# Patient Record
Sex: FEMALE | Race: WHITE | NOT HISPANIC OR LATINO
[De-identification: names, ages, dates, MRNs, and addresses within clinical notes are randomized per-mention and may not be internally consistent; named-entity substitution may affect disease eponyms.]

---

## 2018-01-26 ENCOUNTER — APPOINTMENT (OUTPATIENT)
Dept: SURGERY | Facility: CLINIC | Age: 83
End: 2018-01-26
Payer: MEDICARE

## 2018-02-01 ENCOUNTER — APPOINTMENT (OUTPATIENT)
Dept: SURGERY | Facility: CLINIC | Age: 83
End: 2018-02-01
Payer: MEDICARE

## 2018-02-01 VITALS
WEIGHT: 129 LBS | HEIGHT: 65 IN | DIASTOLIC BLOOD PRESSURE: 78 MMHG | BODY MASS INDEX: 21.49 KG/M2 | SYSTOLIC BLOOD PRESSURE: 122 MMHG

## 2018-02-01 DIAGNOSIS — K40.90 UNILATERAL INGUINAL HERNIA, W/OUT OBSTRUCTION OR GANGRENE, NOT SPECIFIED AS RECURRENT: ICD-10-CM

## 2018-02-01 PROCEDURE — 99212 OFFICE O/P EST SF 10 MIN: CPT

## 2018-04-04 ENCOUNTER — OFFICE VISIT (OUTPATIENT)
Dept: URGENT CARE | Facility: CLINIC | Age: 83
End: 2018-04-04
Payer: MEDICARE

## 2018-04-04 VITALS
DIASTOLIC BLOOD PRESSURE: 84 MMHG | SYSTOLIC BLOOD PRESSURE: 232 MMHG | TEMPERATURE: 98 F | WEIGHT: 135 LBS | HEART RATE: 67 BPM | OXYGEN SATURATION: 98 % | HEIGHT: 64 IN | BODY MASS INDEX: 23.05 KG/M2 | RESPIRATION RATE: 16 BRPM

## 2018-04-04 DIAGNOSIS — I10 ELEVATED BLOOD PRESSURE READING IN OFFICE WITH DIAGNOSIS OF HYPERTENSION: ICD-10-CM

## 2018-04-04 DIAGNOSIS — M54.32 SCIATICA OF LEFT SIDE: Primary | ICD-10-CM

## 2018-04-04 PROCEDURE — 99213 OFFICE O/P EST LOW 20 MIN: CPT | Mod: 25,S$GLB,, | Performed by: NURSE PRACTITIONER

## 2018-04-04 PROCEDURE — 96372 THER/PROPH/DIAG INJ SC/IM: CPT | Mod: S$GLB,,, | Performed by: EMERGENCY MEDICINE

## 2018-04-04 RX ORDER — CLONIDINE HYDROCHLORIDE 0.1 MG/1
0.1 TABLET ORAL
Status: COMPLETED | OUTPATIENT
Start: 2018-04-04 | End: 2018-04-04

## 2018-04-04 RX ORDER — KETOROLAC TROMETHAMINE 30 MG/ML
30 INJECTION, SOLUTION INTRAMUSCULAR; INTRAVENOUS ONCE
Status: COMPLETED | OUTPATIENT
Start: 2018-04-04 | End: 2018-04-04

## 2018-04-04 RX ORDER — KETOROLAC TROMETHAMINE 10 MG/1
10 TABLET, FILM COATED ORAL EVERY 6 HOURS
Qty: 12 TABLET | Refills: 0 | Status: SHIPPED | OUTPATIENT
Start: 2018-04-05 | End: 2018-04-08

## 2018-04-04 RX ORDER — METFORMIN HYDROCHLORIDE 1000 MG/1
1000 TABLET ORAL 2 TIMES DAILY WITH MEALS
COMMUNITY

## 2018-04-04 RX ORDER — PIOGLITAZONEHYDROCHLORIDE 45 MG/1
45 TABLET ORAL DAILY
COMMUNITY

## 2018-04-04 RX ORDER — METOPROLOL SUCCINATE 100 MG/1
100 TABLET, EXTENDED RELEASE ORAL DAILY
COMMUNITY

## 2018-04-04 RX ORDER — LEVOTHYROXINE SODIUM 100 UG/1
100 TABLET ORAL DAILY
COMMUNITY

## 2018-04-04 RX ADMIN — CLONIDINE HYDROCHLORIDE 0.1 MG: 0.1 TABLET ORAL at 05:04

## 2018-04-04 RX ADMIN — KETOROLAC TROMETHAMINE 30 MG: 30 INJECTION, SOLUTION INTRAMUSCULAR; INTRAVENOUS at 05:04

## 2018-04-04 NOTE — PROGRESS NOTES
"Subjective:       Patient ID: Joycelyn Krishna is a 82 y.o. female.    Vitals:  height is 5' 4" (1.626 m) and weight is 61.2 kg (135 lb). Her temperature is 98.2 °F (36.8 °C). Her blood pressure is 232/84 (abnormal) and her pulse is 67. Her respiration is 16 and oxygen saturation is 98%.     Chief Complaint: Hip Pain    Pt presents today with acute left hip/ gluteal pain radiating down left leg. In town from [place] and has been walking more than normal. Denies injury/trauma      Hip Pain    The incident occurred 12 to 24 hours ago. There was no injury mechanism. The pain is present in the left hip. The quality of the pain is described as shooting and cramping. The pain is at a severity of 6/10. The pain is mild. The pain has been fluctuating since onset. Pertinent negatives include no inability to bear weight, loss of motion, loss of sensation, numbness or tingling. She reports no foreign bodies present. Exacerbated by: activity. She has tried nothing for the symptoms. The treatment provided no relief.     Review of Systems   Constitution: Negative for chills, decreased appetite and diaphoresis.   Eyes: Negative.    Cardiovascular: Negative.    Skin: Negative.    Musculoskeletal: Positive for joint pain (left hip) and stiffness.   Gastrointestinal: Negative for bloating and abdominal pain.   Genitourinary: Negative for decreased libido.   Neurological: Negative for numbness and tingling.   All other systems reviewed and are negative.      Objective:      Physical Exam   Constitutional: She is oriented to person, place, and time. She appears well-developed and well-nourished.   HENT:   Head: Normocephalic and atraumatic.   Right Ear: Hearing, tympanic membrane, external ear and ear canal normal. Tympanic membrane is not erythematous. No decreased hearing is noted.   Left Ear: Hearing, tympanic membrane, external ear and ear canal normal. Tympanic membrane is not erythematous. No decreased hearing is noted.   Nose: " Nose normal. No mucosal edema or rhinorrhea. Right sinus exhibits no maxillary sinus tenderness and no frontal sinus tenderness. Left sinus exhibits no maxillary sinus tenderness and no frontal sinus tenderness.   Mouth/Throat: Uvula is midline and mucous membranes are normal. No oropharyngeal exudate or posterior oropharyngeal erythema.   Eyes: Conjunctivae, EOM and lids are normal.   Neck: Normal range of motion. Neck supple.   Cardiovascular: Normal rate, regular rhythm, S1 normal, S2 normal and normal heart sounds.    Pulmonary/Chest: Effort normal and breath sounds normal. No respiratory distress.   Musculoskeletal:        Left hip: She exhibits normal range of motion, normal strength, no tenderness, no bony tenderness and no swelling.        Legs:  Ambulatory without difficulty. NV status intact, 2+ scout dp. No edema   Neurological: She is alert and oriented to person, place, and time. She has normal strength. No cranial nerve deficit or sensory deficit. GCS eye subscore is 4. GCS verbal subscore is 5.   Skin: Skin is warm and dry.   Nursing note and vitals reviewed.      Assessment:       1. Sciatica of left side    2. Elevated blood pressure reading in office with diagnosis of hypertension        Plan:         Sciatica of left side    Elevated blood pressure reading in office with diagnosis of hypertension    Other orders  -     cloNIDine tablet 0.1 mg; Take 1 tablet (0.1 mg total) by mouth one time.  -     ketorolac injection 30 mg; Inject 1 mL (30 mg total) into the muscle once.  -     ketorolac (TORADOL) 10 mg tablet; Take 1 tablet (10 mg total) by mouth every 6 (six) hours.  Dispense: 12 tablet; Refill: 0    elevated bp noted, patient forgot to take medication today

## 2018-07-21 ENCOUNTER — EMERGENCY (EMERGENCY)
Facility: HOSPITAL | Age: 83
LOS: 0 days | Discharge: HOME | End: 2018-07-21
Attending: EMERGENCY MEDICINE | Admitting: EMERGENCY MEDICINE

## 2018-07-21 VITALS
SYSTOLIC BLOOD PRESSURE: 236 MMHG | TEMPERATURE: 97 F | HEART RATE: 67 BPM | RESPIRATION RATE: 20 BRPM | OXYGEN SATURATION: 97 % | DIASTOLIC BLOOD PRESSURE: 100 MMHG

## 2018-07-21 VITALS
OXYGEN SATURATION: 99 % | HEART RATE: 60 BPM | SYSTOLIC BLOOD PRESSURE: 217 MMHG | RESPIRATION RATE: 18 BRPM | DIASTOLIC BLOOD PRESSURE: 89 MMHG
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
## 2018-07-21 DIAGNOSIS — E11.9 TYPE 2 DIABETES MELLITUS WITHOUT COMPLICATIONS: ICD-10-CM

## 2018-07-21 DIAGNOSIS — E03.9 HYPOTHYROIDISM, UNSPECIFIED: ICD-10-CM

## 2018-07-21 DIAGNOSIS — I10 ESSENTIAL (PRIMARY) HYPERTENSION: ICD-10-CM
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
## 2018-07-21 DIAGNOSIS — M25.521 PAIN IN RIGHT ELBOW: ICD-10-CM
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
## 2018-07-21 LAB
ALBUMIN SERPL ELPH-MCNC: 3.7 G/DL — SIGNIFICANT CHANGE UP (ref 3.5–5.2)
ALP SERPL-CCNC: 47 U/L — SIGNIFICANT CHANGE UP (ref 30–115)
ALT FLD-CCNC: 9 U/L — SIGNIFICANT CHANGE UP (ref 0–41)
ANION GAP SERPL CALC-SCNC: 13 MMOL/L — SIGNIFICANT CHANGE UP (ref 7–14)
APPEARANCE UR: CLEAR — SIGNIFICANT CHANGE UP
APTT BLD: 33.3 SEC — SIGNIFICANT CHANGE UP (ref 27–39.2)
AST SERPL-CCNC: 15 U/L — SIGNIFICANT CHANGE UP (ref 0–41)
BASOPHILS # BLD AUTO: 0.06 K/UL — SIGNIFICANT CHANGE UP (ref 0–0.2)
BASOPHILS NFR BLD AUTO: 0.9 % — SIGNIFICANT CHANGE UP (ref 0–1)
BILIRUB SERPL-MCNC: 0.2 MG/DL — SIGNIFICANT CHANGE UP (ref 0.2–1.2)
BILIRUB UR-MCNC: NEGATIVE — SIGNIFICANT CHANGE UP
BUN SERPL-MCNC: 26 MG/DL — HIGH (ref 10–20)
CALCIUM SERPL-MCNC: 9.6 MG/DL — SIGNIFICANT CHANGE UP (ref 8.5–10.1)
CHLORIDE SERPL-SCNC: 102 MMOL/L — SIGNIFICANT CHANGE UP (ref 98–110)
CO2 SERPL-SCNC: 26 MMOL/L — SIGNIFICANT CHANGE UP (ref 17–32)
COLOR SPEC: YELLOW — SIGNIFICANT CHANGE UP
CREAT SERPL-MCNC: 1.2 MG/DL — SIGNIFICANT CHANGE UP (ref 0.7–1.5)
DIFF PNL FLD: NEGATIVE — SIGNIFICANT CHANGE UP
EOSINOPHIL # BLD AUTO: 0.16 K/UL — SIGNIFICANT CHANGE UP (ref 0–0.7)
EOSINOPHIL NFR BLD AUTO: 2.5 % — SIGNIFICANT CHANGE UP (ref 0–8)
GLUCOSE SERPL-MCNC: 118 MG/DL — HIGH (ref 70–99)
GLUCOSE UR QL: NEGATIVE — SIGNIFICANT CHANGE UP
HCT VFR BLD CALC: 30.1 % — LOW (ref 37–47)
HGB BLD-MCNC: 9.7 G/DL — LOW (ref 12–16)
IMM GRANULOCYTES NFR BLD AUTO: 0.3 % — SIGNIFICANT CHANGE UP (ref 0.1–0.3)
INR BLD: 1.15 RATIO — SIGNIFICANT CHANGE UP (ref 0.65–1.3)
KETONES UR-MCNC: NEGATIVE — SIGNIFICANT CHANGE UP
LEUKOCYTE ESTERASE UR-ACNC: NEGATIVE — SIGNIFICANT CHANGE UP
LYMPHOCYTES # BLD AUTO: 1.44 K/UL — SIGNIFICANT CHANGE UP (ref 1.2–3.4)
LYMPHOCYTES # BLD AUTO: 22.6 % — SIGNIFICANT CHANGE UP (ref 20.5–51.1)
MCHC RBC-ENTMCNC: 26.6 PG — LOW (ref 27–31)
MCHC RBC-ENTMCNC: 32.2 G/DL — SIGNIFICANT CHANGE UP (ref 32–37)
MCV RBC AUTO: 82.7 FL — SIGNIFICANT CHANGE UP (ref 81–99)
MONOCYTES # BLD AUTO: 0.88 K/UL — HIGH (ref 0.1–0.6)
MONOCYTES NFR BLD AUTO: 13.8 % — HIGH (ref 1.7–9.3)
NEUTROPHILS # BLD AUTO: 3.81 K/UL — SIGNIFICANT CHANGE UP (ref 1.4–6.5)
NEUTROPHILS NFR BLD AUTO: 59.9 % — SIGNIFICANT CHANGE UP (ref 42.2–75.2)
NITRITE UR-MCNC: NEGATIVE — SIGNIFICANT CHANGE UP
NRBC # BLD: 0 /100 WBCS — SIGNIFICANT CHANGE UP (ref 0–0)
PH UR: 6 — SIGNIFICANT CHANGE UP (ref 5–8)
PLATELET # BLD AUTO: 258 K/UL — SIGNIFICANT CHANGE UP (ref 130–400)
POTASSIUM SERPL-MCNC: 4.2 MMOL/L — SIGNIFICANT CHANGE UP (ref 3.5–5)
POTASSIUM SERPL-SCNC: 4.2 MMOL/L — SIGNIFICANT CHANGE UP (ref 3.5–5)
PROT SERPL-MCNC: 6.6 G/DL — SIGNIFICANT CHANGE UP (ref 6–8)
PROT UR-MCNC: NEGATIVE — SIGNIFICANT CHANGE UP
PROTHROM AB SERPL-ACNC: 12.4 SEC — SIGNIFICANT CHANGE UP (ref 9.95–12.87)
RBC # BLD: 3.64 M/UL — LOW (ref 4.2–5.4)
RBC # FLD: 14.9 % — HIGH (ref 11.5–14.5)
SODIUM SERPL-SCNC: 141 MMOL/L — SIGNIFICANT CHANGE UP (ref 135–146)
SP GR SPEC: 1.01 — SIGNIFICANT CHANGE UP (ref 1.01–1.03)
TROPONIN T SERPL-MCNC: <0.01 NG/ML — SIGNIFICANT CHANGE UP
UROBILINOGEN FLD QL: 0.2 — SIGNIFICANT CHANGE UP (ref 0.2–0.2)
WBC # BLD: 6.37 K/UL — SIGNIFICANT CHANGE UP (ref 4.8–10.8)
WBC # FLD AUTO: 6.37 K/UL — SIGNIFICANT CHANGE UP (ref 4.8–10.8)

## 2018-07-21 RX ORDER — SODIUM CHLORIDE 9 MG/ML
3 INJECTION INTRAMUSCULAR; INTRAVENOUS; SUBCUTANEOUS ONCE
Qty: 0 | Refills: 0 | Status: COMPLETED | OUTPATIENT
Start: 2018-07-21 | End: 2018-07-21

## 2018-07-21 RX ADMIN — SODIUM CHLORIDE 3 MILLILITER(S): 9 INJECTION INTRAMUSCULAR; INTRAVENOUS; SUBCUTANEOUS at 18:28

## 2018-07-21 NOTE — ED ADULT NURSE NOTE - OBJECTIVE STATEMENT
patient s/p fall three days ago and complaint of right arm pain. sent from urgent care center for further evaluation.

## 2018-07-21 NOTE — ED PROVIDER NOTE - OBJECTIVE STATEMENT
82 year old female, pmhx HTN, Hypothyroid, DM, presenting s/p fall 3 days ago. Patient states she was reaching for a pot in the kitchen and then had sudden right elbow pain, which caused her to lose her balance and fall. States she hit her head but no LOC, and remembers the whole event. Fall was witnessed by her cousins, who are not at bedside, but daughter at bedside states patient is reliable historian. Patient admits to (+) right elbow pain, but denies fevers, headaches, vision changes, subjective neuro symptoms, dyspnea, palpitations, nausea, vomiting, diarrhea, blood in stool/dark stools, urinary symptoms or other extremity pain, but admits to (+) right rib pain and abdominal discomfort in the LUQ. She was seen at an urgent care today at which time they found her to be hypertensive, so she was sent to the ER. Patient states she forgot to take her BP medication today, but her granddaughter at bedside gave her her medication after arrival to the ED, after VS had been taken. 82 year old female, pmhx HTN, Hypothyroid, DM, presenting s/p fall 3 days ago. Patient states she was reaching for a pot in the kitchen,, which caused her to lose her balance and fall. States she hit her head but no LOC, and remembers the whole event. Fall was witnessed by her cousins, who are not at bedside, but daughter at bedside states patient is reliable historian. Patient admits to (+) right elbow pain, but denies fevers, headaches, vision changes, subjective neuro symptoms, dyspnea, palpitations, nausea, vomiting, diarrhea, blood in stool/dark stools, urinary symptoms or other extremity pain, but admits to (+) right rib pain and abdominal discomfort in the LUQ. She was seen at an urgent care today at which time they found her to be hypertensive, so she was sent to the ER. Patient states she forgot to take her BP medication today, but her granddaughter at bedside gave her her medication after arrival to the ED, after VS had been taken.

## 2018-07-21 NOTE — ED PROVIDER NOTE - PROGRESS NOTE DETAILS
Patient seen and evaluated by me. Labs/imaging ordered. Given that patient fell and is complaining of right rib and upper abd pain, will do trauma work up. In terms of her hypertension, patient took her BP med after initial VS had been taken in triage. Will check for end organ damage, and re-evaluate.

## 2018-07-21 NOTE — ED PROVIDER NOTE - MEDICAL DECISION MAKING DETAILS
Patient with traumatic fall 3 days ago, found to be hypertensive here in ED, but had not taken her BP meds. Patient otherwise asymptomatic from her hypertension. No acute traumatic injuries noted on imaging. Patient well appearing with good outpatient follow up. Fall was mechanical and not syncopal in nature given history, and patient being reliable historian. Patient agrees to follow up with her PMD. Patient with traumatic fall 3 days ago, found to be hypertensive here in ED, but had not taken her BP meds. Patient otherwise asymptomatic from her hypertension. No acute traumatic injuries noted on imaging. Patient well appearing with good outpatient follow up. Fall was mechanical and not syncopal in nature given history, and patient being reliable historian. Patient agrees to follow up with her PMD. Patient notified of incidental findings on CTs, including nodules and need for outpatient follow up. Given copies of reports of these imaging studies at discharge.

## 2018-07-21 NOTE — ED ADULT TRIAGE NOTE - CHIEF COMPLAINT QUOTE
mechanical fall x 3 days ago, no LOC/not on blood thinners. abrasion to chin and knee. right arm pain. elevated BP, compliant with medication. forgot to taken antihypertensive medication today

## 2018-07-21 NOTE — ED PROVIDER NOTE - PHYSICAL EXAMINATION
Vital Signs: I have reviewed the initial vital signs.  Constitutional: NAD, well-nourished, appears stated age, no acute distress.  HEENT: Airway patent, moist MM, no erythema/swelling/deformity of oral structures. EOMI, PERRLA.  CV: regular rate, regular rhythm, well-perfused extremities, 2+ b/l DP and radial pulses equal.  Lungs: BCTA, no increased WOB.  ABD: LUQ tenderness, no guarding or rebound, no pulsatile mass, no hernias. No ecchymosis  MSK: Neck supple, nontender, nl ROM, no stepoff. Chest tender along the right ribs, but no overlying ecchymosis. Back nontender in TLS spine or to b/l bony structures or flanks. Right elbow (+) tenderness along the lateral epicondyle with mild effusion, slightly diminished ROM due to pain, otherwise other extremities nl rom, no deformity.   INTEG: Skin warm, dry, no rash.  NEURO: A&Ox3, CN II-XII intact, normal strength 5/5 all 4 ext, nl sensation throughout, normal speech and coordination. Ambulatory in ED  PSYCH: Calm, cooperative, normal affect and interaction. Vital Signs: I have reviewed the initial vital signs.  Constitutional: NAD, well-nourished, appears stated age, no acute distress.  HEENT: Airway patent, moist MM, no erythema/swelling/deformity of oral structures. EOMI, PERRLA.  CV: regular rate, regular rhythm, well-perfused extremities, 2+ b/l DP and radial pulses equal.  Lungs: BCTA, no increased WOB.  ABD: Diffuse, most pronounced in LUQ tenderness, no guarding or rebound, no pulsatile mass, no hernias. No ecchymosis  MSK: Neck supple, nontender, nl ROM, no stepoff. Chest tender along the right ribs diffusely, but no overlying ecchymosis. Back nontender in TLS spine or to b/l bony structures or flanks. Right elbow (+) tenderness along the lateral epicondyle with mild effusion, slightly diminished ROM due to pain, otherwise other extremities nl rom, no deformity.   INTEG: Skin warm, dry, no rash.  NEURO: A&Ox3, CN II-XII intact, normal strength 5/5 all 4 ext, nl sensation throughout, normal speech and coordination. Ambulatory in ED  PSYCH: Calm, cooperative, normal affect and interaction.

## 2018-07-21 NOTE — ED PROVIDER NOTE - CARE PLAN
Principal Discharge DX:	Fall, initial encounter  Secondary Diagnosis:	Hypertension, unspecified type

## 2018-07-23 ENCOUNTER — EMERGENCY (EMERGENCY)
Facility: HOSPITAL | Age: 83
LOS: 0 days | Discharge: HOME | End: 2018-07-23
Attending: EMERGENCY MEDICINE | Admitting: EMERGENCY MEDICINE

## 2018-07-23 VITALS
DIASTOLIC BLOOD PRESSURE: 78 MMHG | TEMPERATURE: 96 F | OXYGEN SATURATION: 98 % | SYSTOLIC BLOOD PRESSURE: 180 MMHG | RESPIRATION RATE: 18 BRPM | HEART RATE: 71 BPM
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
## 2018-07-23 DIAGNOSIS — Z79.899 OTHER LONG TERM (CURRENT) DRUG THERAPY: ICD-10-CM

## 2018-07-23 DIAGNOSIS — S42.494D: ICD-10-CM

## 2018-07-23 DIAGNOSIS — M25.521 PAIN IN RIGHT ELBOW: ICD-10-CM

## 2018-07-23 NOTE — ED POST DISCHARGE NOTE - DETAILS
SPOKE WITH SON AND PATIENT IS RETURNING TO ED ASAP FOR ED ATTENDING EVALUATION. SPOKE WITH SON AND PATIENT IS RETURNING TO ED ASAP FOR ED ATTENDING EVALUATION. ALSO, DISCUSSED WITH SON, QIAN THE NEED FOR PATIENT TO CALL PMD TODAY REGARDING LUNG NODULE AND ADRENAL NODULE AND NEED FOR PET SCAN AND MRI, AS ADVISED BY RADIOLOGIST.

## 2018-07-23 NOTE — ED PROVIDER NOTE - PHYSICAL EXAMINATION
CONSTITUTIONAL: Well-developed; well-nourished; in no acute distress.   SKIN: warm, dry  EXT: Pain to the right elbow. Cap refill <2 sec.   NEURO: Sensation to light touch intact.   PSYCH: Cooperative, appropriate.

## 2018-07-23 NOTE — ED POST DISCHARGE NOTE - RESULT SUMMARY
R HUMERUS FX NOTED: R HUMERUS FX NOTED: ALSO NOTED IS A R LUNG 3.1 CM NODULE, AND A PET SCAN IS ADVISED. A LEFT ADRENAL NODULE NOTED AND AN MRI IS ADVISED

## 2018-07-23 NOTE — ED PROVIDER NOTE - CARE PLAN
Principal Discharge DX:	Other closed nondisplaced fracture of distal end of right humerus with routine healing, subsequent encounter

## 2018-07-23 NOTE — ED PROVIDER NOTE - ATTENDING CONTRIBUTION TO CARE
Pt is a 81yo female with R elbow pain.  Was called [facility] for distal humerus fx seen on XR.  No other complaints.    Exam: R elbow with skin abrasion without signs of infection, tenderness over elbow, no forearm or prox humerus tenderness, 2+ raidal pulse, cap refill <2s, 5/5 hand   Imp: distal radius fx  Plan: splint, ortho f/u

## 2018-07-24 PROBLEM — E11.9 TYPE 2 DIABETES MELLITUS WITHOUT COMPLICATIONS: Chronic | Status: ACTIVE | Noted: 2018-07-21

## 2018-07-24 PROBLEM — I10 ESSENTIAL (PRIMARY) HYPERTENSION: Chronic | Status: ACTIVE | Noted: 2018-07-21

## 2020-10-09 NOTE — ED ADULT NURSE NOTE - FINAL NURSING ELECTRONIC SIGNATURE
23-Jul-2018 18:59 [ECG Reviewed] : reviewed [Normal] : The 12 - lead ECG is normal [Sinus Bradycardia] : sinus bradycardia [P Waves Normal] : the P wave is normal [ECG Intervals CA.] : CA interval is normal [Normal QRS] : the QRS is normal [Low Voltage] : low voltage [ECG Axis] : the QRS axis is normal [Normal ST Segments] : the ST segments are normal [ECG T. Waves] : normal [No Interval Change] : no interval change

## 2020-11-02 ENCOUNTER — LABORATORY RESULT (OUTPATIENT)
Age: 85
End: 2020-11-02

## 2020-11-03 ENCOUNTER — APPOINTMENT (OUTPATIENT)
Dept: GYNECOLOGIC ONCOLOGY | Facility: CLINIC | Age: 85
End: 2020-11-03

## 2020-11-10 ENCOUNTER — APPOINTMENT (OUTPATIENT)
Dept: GYNECOLOGIC ONCOLOGY | Facility: CLINIC | Age: 85
End: 2020-11-10
Payer: MEDICARE

## 2020-11-10 ENCOUNTER — OUTPATIENT (OUTPATIENT)
Dept: OUTPATIENT SERVICES | Facility: HOSPITAL | Age: 85
LOS: 1 days | Discharge: HOME | End: 2020-11-10

## 2020-11-10 VITALS
DIASTOLIC BLOOD PRESSURE: 75 MMHG | WEIGHT: 125 LBS | HEIGHT: 63 IN | HEART RATE: 65 BPM | BODY MASS INDEX: 22.15 KG/M2 | SYSTOLIC BLOOD PRESSURE: 178 MMHG | TEMPERATURE: 97.6 F

## 2020-11-10 PROCEDURE — 99203 OFFICE O/P NEW LOW 30 MIN: CPT | Mod: 57

## 2020-11-12 NOTE — DISCUSSION/SUMMARY
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 \par - discussed with the patient uncertainty of Embx findings and possibility of invasive cancer\par - discussed need for surgery hysterectomy/BSO and possible staging\par - R/B/A explained, will book patient for RATLH/BSO, possible staging, possible exploratory laparotomy\par - medical and pulmonary clearances\par - CT chest/abdomen/pelvis. Patient scheduled to have CT chest today as surveillance of her lung cancer. Per patient recently had CT abdomen/pelvis done. Will try to get records and if unable will send for above test.

## 2020-11-12 NOTE — HISTORY OF PRESENT ILLNESS
[FreeTextEntry1] : Referring physician: Dr. 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 \par \par Ob Hx: NSVDX4\par Gyn Hx: LMP age 52. Denies cysts, fibroids, abnormal PAP smears and STDs\par PMHx: DM, hypothyroidism, childhood asthma, lung cancer s/p lobectomy of 2 lobes in right lung ([facility] 2018)\par PSHx: left inguinal hernia, tonsillectomy, partial thyroidectomy\par Medications: synthroid, glimepiride, metformin, metoprolol, telmisartan, atorvastatin, jenuvia, hydralazine\par Allergies: NKDA\par Social Hx: Denies smoking, alcohol or illicit drug use\par Family Hx: sister lung cancer, brother stomach cancer\par \par Last PAP smear: 12/2019 NILM\par Last mammogram: 12/2019 normal\par Last colonoscopy: 5 years ago normal\par \par 10/26/20 CA-125 7\par 11/2/2020: Embx scant superficial strips of glandular epithelium showing several strips with marked cytologic atypia and nuclear crowding, 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 Cells of interest show strong p53 immunostaining and increase of Ki67 and p16 expression, supporting the diagnosis. Benign endocervical polyp.\par \par Denies acute complaints today. Reports 50 lbs weight loss in past 5 years, unintentional. Denies fever, chills, vomiting, vaginal bleeding, loss of appetite, urinary incontinence, change in bowel habits, hematuria.

## 2020-11-12 NOTE — END OF VISIT
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  Given concern for malignancy and high likelihood of finding invasive serous carcinoma at the time of surgery, recommend robotic hysterectomy with BSO staging.  All risks/benefits/alternatives discussed.  All questions answered.  Patient agreed to plan [Time Spent: ___ minutes] : I have spent [unfilled] minutes of time on the encounter. [>50% of the face to face encounter time was spent on counseling and/or coordination of care for ___] : Greater than 50% of the face to face encounter time was spent on counseling and/or coordination of care for [unfilled]

## 2020-11-19 ENCOUNTER — RESULT REVIEW (OUTPATIENT)
Age: 85
End: 2020-11-19

## 2020-11-19 ENCOUNTER — OUTPATIENT (OUTPATIENT)
Dept: OUTPATIENT SERVICES | Facility: HOSPITAL | Age: 85
LOS: 1 days | Discharge: HOME | End: 2020-11-19
Payer: MEDICARE

## 2020-11-19 VITALS
HEART RATE: 61 BPM | SYSTOLIC BLOOD PRESSURE: 145 MMHG | TEMPERATURE: 97 F | WEIGHT: 112.66 LBS | DIASTOLIC BLOOD PRESSURE: 77 MMHG | OXYGEN SATURATION: 99 % | RESPIRATION RATE: 16 BRPM

## 2020-11-19 DIAGNOSIS — Z98.890 OTHER SPECIFIED POSTPROCEDURAL STATES: Chronic | ICD-10-CM

## 2020-11-19 DIAGNOSIS — C54.1 MALIGNANT NEOPLASM OF ENDOMETRIUM: ICD-10-CM

## 2020-11-19 DIAGNOSIS — Z01.818 ENCOUNTER FOR OTHER PREPROCEDURAL EXAMINATION: ICD-10-CM

## 2020-11-19 LAB
A1C WITH ESTIMATED AVERAGE GLUCOSE RESULT: 7.9 % — HIGH (ref 4–5.6)
ALBUMIN SERPL ELPH-MCNC: 4.3 G/DL — SIGNIFICANT CHANGE UP (ref 3.5–5.2)
ALP SERPL-CCNC: 63 U/L — SIGNIFICANT CHANGE UP (ref 30–115)
ALT FLD-CCNC: 17 U/L — SIGNIFICANT CHANGE UP (ref 0–41)
ANION GAP SERPL CALC-SCNC: 11 MMOL/L — SIGNIFICANT CHANGE UP (ref 7–14)
APPEARANCE UR: ABNORMAL
APTT BLD: 33 SEC — SIGNIFICANT CHANGE UP (ref 27–39.2)
AST SERPL-CCNC: 19 U/L — SIGNIFICANT CHANGE UP (ref 0–41)
BACTERIA # UR AUTO: ABNORMAL
BASOPHILS # BLD AUTO: 0.09 K/UL — SIGNIFICANT CHANGE UP (ref 0–0.2)
BASOPHILS NFR BLD AUTO: 1.3 % — HIGH (ref 0–1)
BILIRUB SERPL-MCNC: 0.4 MG/DL — SIGNIFICANT CHANGE UP (ref 0.2–1.2)
BILIRUB UR-MCNC: NEGATIVE — SIGNIFICANT CHANGE UP
BUN SERPL-MCNC: 33 MG/DL — HIGH (ref 10–20)
CALCIUM SERPL-MCNC: 9.5 MG/DL — SIGNIFICANT CHANGE UP (ref 8.5–10.1)
CHLORIDE SERPL-SCNC: 102 MMOL/L — SIGNIFICANT CHANGE UP (ref 98–110)
CO2 SERPL-SCNC: 25 MMOL/L — SIGNIFICANT CHANGE UP (ref 17–32)
COLOR SPEC: SIGNIFICANT CHANGE UP
CREAT SERPL-MCNC: 1 MG/DL — SIGNIFICANT CHANGE UP (ref 0.7–1.5)
DIFF PNL FLD: NEGATIVE — SIGNIFICANT CHANGE UP
EOSINOPHIL # BLD AUTO: 0.17 K/UL — SIGNIFICANT CHANGE UP (ref 0–0.7)
EOSINOPHIL NFR BLD AUTO: 2.5 % — SIGNIFICANT CHANGE UP (ref 0–8)
EPI CELLS # UR: 3 /HPF — SIGNIFICANT CHANGE UP (ref 0–5)
ESTIMATED AVERAGE GLUCOSE: 180 MG/DL — HIGH (ref 68–114)
GLUCOSE SERPL-MCNC: 146 MG/DL — HIGH (ref 70–99)
GLUCOSE UR QL: ABNORMAL
HCT VFR BLD CALC: 33.7 % — LOW (ref 37–47)
HGB BLD-MCNC: 10.1 G/DL — LOW (ref 12–16)
HYALINE CASTS # UR AUTO: 0 /LPF — SIGNIFICANT CHANGE UP (ref 0–7)
IMM GRANULOCYTES NFR BLD AUTO: 0.1 % — SIGNIFICANT CHANGE UP (ref 0.1–0.3)
INR BLD: 1.05 RATIO — SIGNIFICANT CHANGE UP (ref 0.65–1.3)
KETONES UR-MCNC: NEGATIVE — SIGNIFICANT CHANGE UP
LEUKOCYTE ESTERASE UR-ACNC: ABNORMAL
LYMPHOCYTES # BLD AUTO: 1.05 K/UL — LOW (ref 1.2–3.4)
LYMPHOCYTES # BLD AUTO: 15.6 % — LOW (ref 20.5–51.1)
MCHC RBC-ENTMCNC: 24 PG — LOW (ref 27–31)
MCHC RBC-ENTMCNC: 30 G/DL — LOW (ref 32–37)
MCV RBC AUTO: 80 FL — LOW (ref 81–99)
MONOCYTES # BLD AUTO: 0.69 K/UL — HIGH (ref 0.1–0.6)
MONOCYTES NFR BLD AUTO: 10.3 % — HIGH (ref 1.7–9.3)
NEUTROPHILS # BLD AUTO: 4.7 K/UL — SIGNIFICANT CHANGE UP (ref 1.4–6.5)
NEUTROPHILS NFR BLD AUTO: 70.2 % — SIGNIFICANT CHANGE UP (ref 42.2–75.2)
NITRITE UR-MCNC: POSITIVE
NRBC # BLD: 0 /100 WBCS — SIGNIFICANT CHANGE UP (ref 0–0)
PH UR: 6 — SIGNIFICANT CHANGE UP (ref 5–8)
PLATELET # BLD AUTO: 360 K/UL — SIGNIFICANT CHANGE UP (ref 130–400)
POTASSIUM SERPL-MCNC: 5 MMOL/L — SIGNIFICANT CHANGE UP (ref 3.5–5)
POTASSIUM SERPL-SCNC: 5 MMOL/L — SIGNIFICANT CHANGE UP (ref 3.5–5)
PROT SERPL-MCNC: 7 G/DL — SIGNIFICANT CHANGE UP (ref 6–8)
PROT UR-MCNC: SIGNIFICANT CHANGE UP
PROTHROM AB SERPL-ACNC: 12.1 SEC — SIGNIFICANT CHANGE UP (ref 9.95–12.87)
RBC # BLD: 4.21 M/UL — SIGNIFICANT CHANGE UP (ref 4.2–5.4)
RBC # FLD: 19 % — HIGH (ref 11.5–14.5)
RBC CASTS # UR COMP ASSIST: 1 /HPF — SIGNIFICANT CHANGE UP (ref 0–4)
SODIUM SERPL-SCNC: 138 MMOL/L — SIGNIFICANT CHANGE UP (ref 135–146)
SP GR SPEC: 1.01 — SIGNIFICANT CHANGE UP (ref 1.01–1.03)
UROBILINOGEN FLD QL: SIGNIFICANT CHANGE UP
WBC # BLD: 6.71 K/UL — SIGNIFICANT CHANGE UP (ref 4.8–10.8)
WBC # FLD AUTO: 6.71 K/UL — SIGNIFICANT CHANGE UP (ref 4.8–10.8)
WBC UR QL: 262 /HPF — HIGH (ref 0–5)

## 2020-11-19 PROCEDURE — 71046 X-RAY EXAM CHEST 2 VIEWS: CPT | Mod: 26

## 2020-11-19 PROCEDURE — 93010 ELECTROCARDIOGRAM REPORT: CPT

## 2020-11-19 RX ORDER — METOPROLOL TARTRATE 50 MG
0 TABLET ORAL
Qty: 0 | Refills: 0 | DISCHARGE

## 2020-11-19 RX ORDER — PIOGLITAZONE HYDROCHLORIDE 15 MG/1
0 TABLET ORAL
Qty: 0 | Refills: 0 | DISCHARGE

## 2020-11-19 RX ORDER — METFORMIN HYDROCHLORIDE 850 MG/1
0 TABLET ORAL
Qty: 0 | Refills: 0 | DISCHARGE

## 2020-11-19 RX ORDER — LEVOTHYROXINE SODIUM 125 MCG
0 TABLET ORAL
Qty: 0 | Refills: 0 | DISCHARGE

## 2020-11-19 NOTE — H&P PST ADULT - NSICDXPASTMEDICALHX_GEN_ALL_CORE_FT
PAST MEDICAL HISTORY:  CKD (chronic kidney disease)     DM (diabetes mellitus)     HLD (hyperlipidemia)     HTN (hypertension)     Hypothyroidism     Nodule of right lung s/p resection    Occasional tremors      PAST MEDICAL HISTORY:  CKD (chronic kidney disease) stage 3A    DM (diabetes mellitus)     HLD (hyperlipidemia)     HTN (hypertension)     Hypothyroidism     Nodule of right lung s/p resection    Occasional tremors

## 2020-11-19 NOTE — H&P PST ADULT - NSICDXPASTSURGICALHX_GEN_ALL_CORE_FT
PAST SURGICAL HISTORY:  History of hernia surgery     History of lung surgery lung nodule resection

## 2020-11-19 NOTE — H&P PST ADULT - HISTORY OF PRESENT ILLNESS
The patient is an 84 year old female recently diagnosed with endometrial cancer who consulted with Dr. Humphries for evaluation and management. Today, the patient presents to PST for preop evaluation in preparation for scheduled surgery/procedure. The patient denies chest pains, SOB, palpitations, cough or dysuria. Able to walk with slow steady gaits without ALANIS, CP or palpitations The patient is an 84 year old female recently diagnosed with endometrial cancer who consulted with Dr. Humphries for evaluation and management. Today, the patient presents to [facility] for preop evaluation in preparation for scheduled surgery/procedure. The patient denies chest pains, SOB, palpitations, cough or dysuria. Able to walk with slow steady gaits without ALANIS, CP or palpitations    Anesthesia Alert  NO--Difficult Airway  NO--History of neck surgery or radiation  NO--Limited ROM of neck  NO--History of Malignant hyperthermia  NO--No personal or family history of Pseudocholinesterase deficiency.  NO--Prior Anesthesia Complication  NO--Latex Allergy  NO--Loose teeth  NO--History of Rheumatoid Arthritis  NO--CAITLYN  NO--Other_____

## 2020-11-19 NOTE — H&P PST ADULT - NSANTHOSAYNRD_GEN_A_CORE
No. CAITLYN screening performed.  STOP BANG Legend: 0-2 = LOW Risk; 3-4 = INTERMEDIATE Risk; 5-8 = HIGH Risk

## 2020-11-20 ENCOUNTER — APPOINTMENT (OUTPATIENT)
Dept: PULMONOLOGY | Facility: CLINIC | Age: 85
End: 2020-11-20
Payer: MEDICARE

## 2020-11-20 ENCOUNTER — OUTPATIENT (OUTPATIENT)
Dept: OUTPATIENT SERVICES | Facility: HOSPITAL | Age: 85
LOS: 1 days | Discharge: HOME | End: 2020-11-20

## 2020-11-20 VITALS
TEMPERATURE: 98.6 F | SYSTOLIC BLOOD PRESSURE: 209 MMHG | BODY MASS INDEX: 19.84 KG/M2 | HEIGHT: 63 IN | OXYGEN SATURATION: 98 % | DIASTOLIC BLOOD PRESSURE: 93 MMHG | WEIGHT: 112 LBS | HEART RATE: 64 BPM

## 2020-11-20 DIAGNOSIS — Z78.9 OTHER SPECIFIED HEALTH STATUS: ICD-10-CM

## 2020-11-20 DIAGNOSIS — Z98.890 OTHER SPECIFIED POSTPROCEDURAL STATES: Chronic | ICD-10-CM

## 2020-11-20 DIAGNOSIS — Z86.39 PERSONAL HISTORY OF OTHER ENDOCRINE, NUTRITIONAL AND METABOLIC DISEASE: ICD-10-CM

## 2020-11-20 DIAGNOSIS — I16.0 HYPERTENSIVE URGENCY: ICD-10-CM

## 2020-11-20 PROBLEM — R91.1 SOLITARY PULMONARY NODULE: Chronic | Status: ACTIVE | Noted: 2020-11-19

## 2020-11-20 PROCEDURE — 99203 OFFICE O/P NEW LOW 30 MIN: CPT | Mod: GC

## 2020-11-20 PROCEDURE — 99214 OFFICE O/P EST MOD 30 MIN: CPT | Mod: GC

## 2020-11-20 NOTE — PHYSICAL EXAM
[No Acute Distress] : no acute distress [Normal Oropharynx] : normal oropharynx [Supple] : supple [Normal Rate/Rhythm] : normal rate/rhythm [Normal S1, S2] : normal s1, s2 [No Murmurs] : no murmurs [No Resp Distress] : no resp distress [Clear to Auscultation Bilaterally] : clear to auscultation bilaterally

## 2020-11-20 NOTE — HISTORY OF PRESENT ILLNESS
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 Patient was having post-menopausal bleeding found to have serous carcinoma on endometrial biopsy in October 2020. Patient saw gyn-onc on Nov 10th who recommended surgery. Patient denies ever smoking. Patient not on any inhalers. Dr. Humphries would do the surgery (gyn onc). \par \par Patient also came in to office with hypertensive urgency /93. Patient was asymptomatic; denied headaches, CP, SOB. Takes metoprolol tartrate 100mg bid, telmisartan 80mg daily, hydralazine 50mg 3x a day. \par

## 2020-11-20 NOTE — REVIEW OF SYSTEMS
[Cough] : no cough [Hemoptysis] : no hemoptysis [Dyspnea] : no dyspnea [Pleuritic Pain] : no pleuritic pain [Wheezing] : no wheezing [Chest Discomfort] : no chest discomfort [Syncope] : no syncope

## 2020-11-20 NOTE — ASSESSMENT
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 ([facility] 2018) w/ mediastinal LAD here for pulm clearance (given hx of lung ca) for hysterectomy/BSO.\par \par #RUL lung cancer s/p RUL lobectomy 2018 \par - patient does not use inhalers and satting normally on room air \par - denies any pain or SOB \par - METS > 4, RCRI class I \par - cleared from pulmonary point of view for surgery\par - low risk patient for  intermediate risk surgery\par - will refer to cardio for clearance given her hypertension; says she had echo 4-5 years ago, doesn’t remember exact results but said it was normal \par \par #hypertensive urgency\par - /93, repeat 200/110; patient is asymptomatic \par - metoprolol tartrate 100mg bid, telmisartan 80mg daily, hydralazine 50mg 3x a day. \par - will give her metoprolol and hydralazine in office and have her sit in office and recheck in 30mins \par - will get pharmacist to give her BP meds before she leaves office

## 2020-11-23 DIAGNOSIS — C54.1 MALIGNANT NEOPLASM OF ENDOMETRIUM: ICD-10-CM

## 2020-11-27 ENCOUNTER — OUTPATIENT (OUTPATIENT)
Dept: OUTPATIENT SERVICES | Facility: HOSPITAL | Age: 85
LOS: 1 days | Discharge: HOME | End: 2020-11-27
Payer: MEDICARE

## 2020-11-27 ENCOUNTER — RESULT REVIEW (OUTPATIENT)
Age: 85
End: 2020-11-27

## 2020-11-27 DIAGNOSIS — Z98.890 OTHER SPECIFIED POSTPROCEDURAL STATES: Chronic | ICD-10-CM

## 2020-11-27 DIAGNOSIS — C64.1 MALIGNANT NEOPLASM OF RIGHT KIDNEY, EXCEPT RENAL PELVIS: ICD-10-CM

## 2020-11-27 PROCEDURE — 74177 CT ABD & PELVIS W/CONTRAST: CPT | Mod: 26

## 2020-12-01 ENCOUNTER — OUTPATIENT (OUTPATIENT)
Dept: OUTPATIENT SERVICES | Facility: HOSPITAL | Age: 85
LOS: 1 days | Discharge: HOME | End: 2020-12-01
Payer: MEDICARE

## 2020-12-01 ENCOUNTER — APPOINTMENT (OUTPATIENT)
Dept: CARDIOLOGY | Facility: CLINIC | Age: 85
End: 2020-12-01
Payer: MEDICARE

## 2020-12-01 VITALS
HEART RATE: 69 BPM | HEIGHT: 63 IN | WEIGHT: 116 LBS | TEMPERATURE: 98.9 F | BODY MASS INDEX: 20.55 KG/M2 | DIASTOLIC BLOOD PRESSURE: 94 MMHG | SYSTOLIC BLOOD PRESSURE: 198 MMHG

## 2020-12-01 DIAGNOSIS — Z98.890 OTHER SPECIFIED POSTPROCEDURAL STATES: Chronic | ICD-10-CM

## 2020-12-01 DIAGNOSIS — Z85.118 PERSONAL HISTORY OF OTHER MALIGNANT NEOPLASM OF BRONCHUS AND LUNG: ICD-10-CM

## 2020-12-01 DIAGNOSIS — C34.91 MALIGNANT NEOPLASM OF UNSPECIFIED PART OF RIGHT BRONCHUS OR LUNG: ICD-10-CM

## 2020-12-01 DIAGNOSIS — Z86.39 PERSONAL HISTORY OF OTHER ENDOCRINE, NUTRITIONAL AND METABOLIC DISEASE: ICD-10-CM

## 2020-12-01 DIAGNOSIS — I10 ESSENTIAL (PRIMARY) HYPERTENSION: ICD-10-CM

## 2020-12-01 PROCEDURE — 99213 OFFICE O/P EST LOW 20 MIN: CPT

## 2020-12-01 PROCEDURE — 93010 ELECTROCARDIOGRAM REPORT: CPT

## 2020-12-01 NOTE — REASON FOR VISIT
[FreeTextEntry1] : 85 y/o w/ PMHx of DM, hypothyroidism, asthma, parkinsons disease, RUL lung adenocarcinoma presents for cardiac evaluation prior to gyn surgery. Patient reports feeling at her baseline health. her BP in the office today is 198/94. She denies any visual changes, chest pain, shortness of breath, abdominal pain. She took her metoprolol today. She is on metoprolol tartrate 100mg bid, telmisartan 80mg daily, hydralazine 50mg 3x a day. \par

## 2020-12-01 NOTE — PHYSICAL EXAM
[General Appearance - Well Developed] : well developed [General Appearance - Well Nourished] : well nourished [Heart Rate And Rhythm] : heart rate and rhythm were normal [Heart Sounds] : normal S1 and S2 [Murmurs] : no murmurs present [Arterial Pulses Normal] : the arterial pulses were normal [Edema] : no peripheral edema present [] : no respiratory distress [Respiration, Rhythm And Depth] : normal respiratory rhythm and effort [Exaggerated Use Of Accessory Muscles For Inspiration] : no accessory muscle use [Bowel Sounds] : normal bowel sounds [Abdomen Soft] : soft [Abdomen Tenderness] : non-tender

## 2020-12-01 NOTE — ASSESSMENT
[FreeTextEntry1] : #Hypertension\par - patient asymptomatic\par - will increase the hydralazine from 3 times daily to 4 times daily\par - add norvasc 2.5 daily\par - otherwise no other cardiac work up needed prior to her upcoming procedure\par >4 Mets , low risk procedure Mild cardiac risk \par - f/u in 1 month to monitor blood pressure\par - if blood pressure better can decrease hydralazine and increase norvasc

## 2020-12-07 ENCOUNTER — OUTPATIENT (OUTPATIENT)
Dept: OUTPATIENT SERVICES | Facility: HOSPITAL | Age: 85
LOS: 1 days | Discharge: HOME | End: 2020-12-07

## 2020-12-07 DIAGNOSIS — N39.0 URINARY TRACT INFECTION, SITE NOT SPECIFIED: ICD-10-CM

## 2020-12-07 DIAGNOSIS — Z98.890 OTHER SPECIFIED POSTPROCEDURAL STATES: Chronic | ICD-10-CM

## 2020-12-07 DIAGNOSIS — Z11.59 ENCOUNTER FOR SCREENING FOR OTHER VIRAL DISEASES: ICD-10-CM

## 2020-12-10 ENCOUNTER — INPATIENT (INPATIENT)
Facility: HOSPITAL | Age: 85
LOS: 0 days | Discharge: HOME | End: 2020-12-11
Attending: OBSTETRICS & GYNECOLOGY | Admitting: OBSTETRICS & GYNECOLOGY
Payer: MEDICARE

## 2020-12-10 ENCOUNTER — RESULT REVIEW (OUTPATIENT)
Age: 85
End: 2020-12-10

## 2020-12-10 ENCOUNTER — OUTPATIENT (OUTPATIENT)
Dept: OUTPATIENT SERVICES | Facility: HOSPITAL | Age: 85
LOS: 1 days | Discharge: HOME | End: 2020-12-10

## 2020-12-10 VITALS
DIASTOLIC BLOOD PRESSURE: 69 MMHG | WEIGHT: 119.93 LBS | SYSTOLIC BLOOD PRESSURE: 114 MMHG | TEMPERATURE: 98 F | HEIGHT: 63 IN | HEART RATE: 62 BPM | RESPIRATION RATE: 18 BRPM | OXYGEN SATURATION: 100 %

## 2020-12-10 DIAGNOSIS — Z98.890 OTHER SPECIFIED POSTPROCEDURAL STATES: Chronic | ICD-10-CM

## 2020-12-10 DIAGNOSIS — E11.22 TYPE 2 DIABETES MELLITUS WITH DIABETIC CHRONIC KIDNEY DISEASE: ICD-10-CM

## 2020-12-10 DIAGNOSIS — Z79.4 LONG TERM (CURRENT) USE OF INSULIN: ICD-10-CM

## 2020-12-10 DIAGNOSIS — C54.1 MALIGNANT NEOPLASM OF ENDOMETRIUM: ICD-10-CM

## 2020-12-10 DIAGNOSIS — I12.9 HYPERTENSIVE CHRONIC KIDNEY DISEASE WITH STAGE 1 THROUGH STAGE 4 CHRONIC KIDNEY DISEASE, OR UNSPECIFIED CHRONIC KIDNEY DISEASE: ICD-10-CM

## 2020-12-10 DIAGNOSIS — N18.9 CHRONIC KIDNEY DISEASE, UNSPECIFIED: ICD-10-CM

## 2020-12-10 DIAGNOSIS — E78.5 HYPERLIPIDEMIA, UNSPECIFIED: ICD-10-CM

## 2020-12-10 DIAGNOSIS — E03.9 HYPOTHYROIDISM, UNSPECIFIED: ICD-10-CM

## 2020-12-10 LAB
ABO RH CONFIRMATION: SIGNIFICANT CHANGE UP
ANION GAP SERPL CALC-SCNC: 12 MMOL/L — SIGNIFICANT CHANGE UP (ref 7–14)
BASOPHILS # BLD AUTO: 0.05 K/UL — SIGNIFICANT CHANGE UP (ref 0–0.2)
BASOPHILS NFR BLD AUTO: 0.3 % — SIGNIFICANT CHANGE UP (ref 0–1)
BLD GP AB SCN SERPL QL: SIGNIFICANT CHANGE UP
BUN SERPL-MCNC: 30 MG/DL — HIGH (ref 10–20)
CALCIUM SERPL-MCNC: 9.2 MG/DL — SIGNIFICANT CHANGE UP (ref 8.5–10.1)
CHLORIDE SERPL-SCNC: 102 MMOL/L — SIGNIFICANT CHANGE UP (ref 98–110)
CO2 SERPL-SCNC: 22 MMOL/L — SIGNIFICANT CHANGE UP (ref 17–32)
CREAT SERPL-MCNC: 1.4 MG/DL — SIGNIFICANT CHANGE UP (ref 0.7–1.5)
EOSINOPHIL # BLD AUTO: 0 K/UL — SIGNIFICANT CHANGE UP (ref 0–0.7)
EOSINOPHIL NFR BLD AUTO: 0 % — SIGNIFICANT CHANGE UP (ref 0–8)
GLUCOSE BLDC GLUCOMTR-MCNC: 109 MG/DL — HIGH (ref 70–99)
GLUCOSE BLDC GLUCOMTR-MCNC: 115 MG/DL — HIGH (ref 70–99)
GLUCOSE BLDC GLUCOMTR-MCNC: 142 MG/DL — HIGH (ref 70–99)
GLUCOSE BLDC GLUCOMTR-MCNC: 177 MG/DL — HIGH (ref 70–99)
GLUCOSE SERPL-MCNC: 122 MG/DL — HIGH (ref 70–99)
HCT VFR BLD CALC: 35.6 % — LOW (ref 37–47)
HGB BLD-MCNC: 10.9 G/DL — LOW (ref 12–16)
IMM GRANULOCYTES NFR BLD AUTO: 0.4 % — HIGH (ref 0.1–0.3)
LYMPHOCYTES # BLD AUTO: 1.15 K/UL — LOW (ref 1.2–3.4)
LYMPHOCYTES # BLD AUTO: 6.1 % — LOW (ref 20.5–51.1)
MAGNESIUM SERPL-MCNC: 1.9 MG/DL — SIGNIFICANT CHANGE UP (ref 1.8–2.4)
MCHC RBC-ENTMCNC: 24.9 PG — LOW (ref 27–31)
MCHC RBC-ENTMCNC: 30.6 G/DL — LOW (ref 32–37)
MCV RBC AUTO: 81.5 FL — SIGNIFICANT CHANGE UP (ref 81–99)
MONOCYTES # BLD AUTO: 1.47 K/UL — HIGH (ref 0.1–0.6)
MONOCYTES NFR BLD AUTO: 7.7 % — SIGNIFICANT CHANGE UP (ref 1.7–9.3)
NEUTROPHILS # BLD AUTO: 16.25 K/UL — HIGH (ref 1.4–6.5)
NEUTROPHILS NFR BLD AUTO: 85.5 % — HIGH (ref 42.2–75.2)
NRBC # BLD: 0 /100 WBCS — SIGNIFICANT CHANGE UP (ref 0–0)
PHOSPHATE SERPL-MCNC: 4 MG/DL — SIGNIFICANT CHANGE UP (ref 2.1–4.9)
PLATELET # BLD AUTO: 312 K/UL — SIGNIFICANT CHANGE UP (ref 130–400)
POTASSIUM SERPL-MCNC: 5.5 MMOL/L — HIGH (ref 3.5–5)
POTASSIUM SERPL-SCNC: 5.5 MMOL/L — HIGH (ref 3.5–5)
RBC # BLD: 4.37 M/UL — SIGNIFICANT CHANGE UP (ref 4.2–5.4)
RBC # FLD: 19 % — HIGH (ref 11.5–14.5)
SODIUM SERPL-SCNC: 136 MMOL/L — SIGNIFICANT CHANGE UP (ref 135–146)
WBC # BLD: 18.99 K/UL — HIGH (ref 4.8–10.8)
WBC # FLD AUTO: 18.99 K/UL — HIGH (ref 4.8–10.8)

## 2020-12-10 PROCEDURE — 58548 LAP RADICAL HYST: CPT

## 2020-12-10 PROCEDURE — 88112 CYTOPATH CELL ENHANCE TECH: CPT | Mod: 26

## 2020-12-10 PROCEDURE — 88305 TISSUE EXAM BY PATHOLOGIST: CPT | Mod: 26

## 2020-12-10 PROCEDURE — 88307 TISSUE EXAM BY PATHOLOGIST: CPT | Mod: 26

## 2020-12-10 PROCEDURE — 88309 TISSUE EXAM BY PATHOLOGIST: CPT | Mod: 26

## 2020-12-10 RX ORDER — INSULIN LISPRO 100/ML
VIAL (ML) SUBCUTANEOUS EVERY 4 HOURS
Refills: 0 | Status: DISCONTINUED | OUTPATIENT
Start: 2020-12-10 | End: 2020-12-11

## 2020-12-10 RX ORDER — HYDROMORPHONE HYDROCHLORIDE 2 MG/ML
0.5 INJECTION INTRAMUSCULAR; INTRAVENOUS; SUBCUTANEOUS
Refills: 0 | Status: DISCONTINUED | OUTPATIENT
Start: 2020-12-10 | End: 2020-12-11

## 2020-12-10 RX ORDER — DEXTROSE 50 % IN WATER 50 %
12.5 SYRINGE (ML) INTRAVENOUS ONCE
Refills: 0 | Status: DISCONTINUED | OUTPATIENT
Start: 2020-12-10 | End: 2020-12-11

## 2020-12-10 RX ORDER — METOPROLOL TARTRATE 50 MG
100 TABLET ORAL EVERY 12 HOURS
Refills: 0 | Status: DISCONTINUED | OUTPATIENT
Start: 2020-12-10 | End: 2020-12-11

## 2020-12-10 RX ORDER — DEXTROSE 50 % IN WATER 50 %
25 SYRINGE (ML) INTRAVENOUS ONCE
Refills: 0 | Status: DISCONTINUED | OUTPATIENT
Start: 2020-12-10 | End: 2020-12-11

## 2020-12-10 RX ORDER — ONDANSETRON 8 MG/1
4 TABLET, FILM COATED ORAL EVERY 6 HOURS
Refills: 0 | Status: DISCONTINUED | OUTPATIENT
Start: 2020-12-10 | End: 2020-12-11

## 2020-12-10 RX ORDER — LOSARTAN POTASSIUM 100 MG/1
100 TABLET, FILM COATED ORAL DAILY
Refills: 0 | Status: DISCONTINUED | OUTPATIENT
Start: 2020-12-10 | End: 2020-12-11

## 2020-12-10 RX ORDER — FAMOTIDINE 10 MG/ML
20 INJECTION INTRAVENOUS DAILY
Refills: 0 | Status: DISCONTINUED | OUTPATIENT
Start: 2020-12-10 | End: 2020-12-11

## 2020-12-10 RX ORDER — SODIUM CHLORIDE 9 MG/ML
1000 INJECTION, SOLUTION INTRAVENOUS
Refills: 0 | Status: DISCONTINUED | OUTPATIENT
Start: 2020-12-10 | End: 2020-12-11

## 2020-12-10 RX ORDER — LEVOTHYROXINE SODIUM 125 MCG
100 TABLET ORAL DAILY
Refills: 0 | Status: DISCONTINUED | OUTPATIENT
Start: 2020-12-11 | End: 2020-12-11

## 2020-12-10 RX ORDER — ACETAMINOPHEN 500 MG
975 TABLET ORAL EVERY 6 HOURS
Refills: 0 | Status: DISCONTINUED | OUTPATIENT
Start: 2020-12-10 | End: 2020-12-11

## 2020-12-10 RX ORDER — HYDRALAZINE HCL 50 MG
50 TABLET ORAL DAILY
Refills: 0 | Status: DISCONTINUED | OUTPATIENT
Start: 2020-12-10 | End: 2020-12-11

## 2020-12-10 RX ORDER — OXYCODONE HYDROCHLORIDE 5 MG/1
5 TABLET ORAL EVERY 6 HOURS
Refills: 0 | Status: DISCONTINUED | OUTPATIENT
Start: 2020-12-10 | End: 2020-12-11

## 2020-12-10 RX ORDER — BUPIVACAINE 13.3 MG/ML
20 INJECTION, SUSPENSION, LIPOSOMAL INFILTRATION ONCE
Refills: 0 | Status: DISCONTINUED | OUTPATIENT
Start: 2020-12-10 | End: 2020-12-10

## 2020-12-10 RX ORDER — DEXTROSE 50 % IN WATER 50 %
15 SYRINGE (ML) INTRAVENOUS ONCE
Refills: 0 | Status: DISCONTINUED | OUTPATIENT
Start: 2020-12-10 | End: 2020-12-11

## 2020-12-10 RX ORDER — AMLODIPINE BESYLATE 2.5 MG/1
2.5 TABLET ORAL DAILY
Refills: 0 | Status: DISCONTINUED | OUTPATIENT
Start: 2020-12-10 | End: 2020-12-11

## 2020-12-10 RX ORDER — GABAPENTIN 400 MG/1
300 CAPSULE ORAL EVERY 8 HOURS
Refills: 0 | Status: DISCONTINUED | OUTPATIENT
Start: 2020-12-10 | End: 2020-12-11

## 2020-12-10 RX ORDER — GLUCAGON INJECTION, SOLUTION 0.5 MG/.1ML
1 INJECTION, SOLUTION SUBCUTANEOUS ONCE
Refills: 0 | Status: DISCONTINUED | OUTPATIENT
Start: 2020-12-10 | End: 2020-12-11

## 2020-12-10 RX ORDER — SIMETHICONE 80 MG/1
80 TABLET, CHEWABLE ORAL EVERY 6 HOURS
Refills: 0 | Status: DISCONTINUED | OUTPATIENT
Start: 2020-12-10 | End: 2020-12-11

## 2020-12-10 RX ORDER — ATORVASTATIN CALCIUM 80 MG/1
40 TABLET, FILM COATED ORAL AT BEDTIME
Refills: 0 | Status: DISCONTINUED | OUTPATIENT
Start: 2020-12-10 | End: 2020-12-11

## 2020-12-10 RX ADMIN — Medication 1: at 13:49

## 2020-12-10 RX ADMIN — SODIUM CHLORIDE 100 MILLILITER(S): 9 INJECTION, SOLUTION INTRAVENOUS at 16:41

## 2020-12-10 RX ADMIN — HYDROMORPHONE HYDROCHLORIDE 0.5 MILLIGRAM(S): 2 INJECTION INTRAMUSCULAR; INTRAVENOUS; SUBCUTANEOUS at 14:05

## 2020-12-10 RX ADMIN — SODIUM CHLORIDE 100 MILLILITER(S): 9 INJECTION, SOLUTION INTRAVENOUS at 22:00

## 2020-12-10 RX ADMIN — GABAPENTIN 300 MILLIGRAM(S): 400 CAPSULE ORAL at 13:50

## 2020-12-10 RX ADMIN — HYDROMORPHONE HYDROCHLORIDE 0.5 MILLIGRAM(S): 2 INJECTION INTRAMUSCULAR; INTRAVENOUS; SUBCUTANEOUS at 13:51

## 2020-12-10 RX ADMIN — Medication 975 MILLIGRAM(S): at 22:12

## 2020-12-10 RX ADMIN — SIMETHICONE 80 MILLIGRAM(S): 80 TABLET, CHEWABLE ORAL at 19:55

## 2020-12-10 RX ADMIN — Medication 975 MILLIGRAM(S): at 19:54

## 2020-12-10 NOTE — CHART NOTE - NSCHARTNOTEFT_GEN_A_CORE
PACU ANESTHESIA ADMISSION NOTE      Procedure: Biopsy, omentum, robot-assisted    Hysterectomy, total, robot-assisted, with neoplasm staging      Post op diagnosis:  Endometrial cancer        ____  Intubated  TV:______       Rate: ______      FiO2: ______    _x___  Patent Airway    _x___  Full return of protective reflexes    ___  Full recovery from anesthesia / back to baseline status    Vitals:            T:  97              BP :    148/67            R: 14             Sat: 100%              P:  55      Mental Status:  _x___ Awake   _____ Alert   _____ Drowsy   _____ Sedated    Nausea/Vomiting:  _x___  NO       ______Yes,   See Post - Op Orders         Pain Scale (0-10):  __0___    Treatment: ___ None    __x__ See Post - Op/PCA Orders    Post - Operative Fluids:   ___ Oral   __x__ See Post - Op Orders    Plan: Discharge:   _Home       ___x__Floor     _____Critical Care    _____  Other:_________________    Comments:  No anesthesia issues or complications noted.  Discharge when criteria met.

## 2020-12-10 NOTE — PROGRESS NOTE ADULT - ASSESSMENT
A/P: 85yo P4, PMHx significant for CKD, DM, HTN, HLD, hypothyroidism s/p RATLH/BSO, pelvic/PA LND, omentectomy, pelvic washings, EBL 25cc for serous endometrial cancer, POD#0, recovering well  - continue routine postop care  - f/u 1600 labs (received) (if stable, no need to repeat)   - crenshaw until AM, f/u UO   - FS q4h + SS ordered   - continue home meds  - ERAS for pain management  - SCDs for DVT prophylaxis  - incentive spirometer use encouraged  - regular diet/IVF    Dr. Stallings aware, Dr. Humphries to be made aware.

## 2020-12-10 NOTE — ASU PREOP CHECKLIST - PATIENT PROBLEMS/NEEDS
Physical Therapy Progress Note     10/16/17 1603   Pain Assessment   Pain Assessment No/denies pain   Pain Score No Pain   Restrictions/Precautions   Weight Bearing Precautions Per Order No   Other Precautions Telemetry; Fall Risk   General   Chart Reviewed Yes   Response to Previous Treatment Patient reporting fatigue but able to participate  Family/Caregiver Present No   Subjective   Subjective [place] to participate in therapy this PM    Transfers   Sit to Stand 5  Supervision   Additional items Assist x 1;Bedrails; Increased time required;Verbal cues   Stand to Sit 5  Supervision   Additional items Assist x 1;Bedrails; Increased time required;Verbal cues   Ambulation/Elevation   Gait pattern Forward Flexion; Short stride; Excessively slow  (L foot drop)   Gait Assistance 4  Minimal assist   Additional items Assist x 1;Verbal cues; Tactile cues   Assistive Device Rolling walker   Distance 50'   Balance   Static Sitting Good   Dynamic Sitting Fair   Static Standing Fair   Dynamic Standing Fair -   Ambulatory Fair -   Endurance Deficit   Endurance Deficit Yes   Endurance Deficit Description fatigue   Activity Tolerance   Activity Tolerance Patient limited by fatigue   Nurse Made Aware Yes   Assessment   Prognosis Fair   Problem List Decreased strength;Decreased range of motion;Decreased endurance; Impaired balance;Decreased mobility   Assessment Pt  seated at EOB upon my arrival  Reports amb  with her daughter in room this AM with no complaints  Reports increased fatigue this PM  Able to complete all transfers practicing proper technique with no noted LOB  Progressed with increased amb  trial with use of RW and Alfonso of therapist  Jona Jorgensen by increased fatigue requiring a quick return to bed  Repositioned supine in bed at end of treatment session  Pt  will continue to progress with PT goals to ensure a safe d/c home with Home PT and family support as needed when medically stable      Goals   Patient Goals To go home STG Expiration Date 10/25/17   Treatment Day 1   Plan   Treatment/Interventions Functional transfer training;LE strengthening/ROM; Endurance training;Bed mobility;Gait training;Spoke to nursing;Spoke to case management   Progress Progressing toward goals   PT Frequency 5x/wk   Recommendation   Recommendation Home PT; Home with family support   Equipment Recommended Other (Comment)  (has rollator at home  )   PT - OK to Discharge No  (Continued progression of PT goals )     Delvis Camacho, PTA Patient expressed no known problems or needs

## 2020-12-10 NOTE — ASU PATIENT PROFILE, ADULT - PMH
CKD (chronic kidney disease)  stage 3A  DM (diabetes mellitus)    HLD (hyperlipidemia)    HTN (hypertension)    Hypothyroidism    Nodule of right lung  s/p resection  Occasional tremors

## 2020-12-10 NOTE — PROGRESS NOTE ADULT - SUBJECTIVE AND OBJECTIVE BOX
PGY 2 Note  Patient seen and evaluated at bedside. Doing well, no acute complaints. Denies fever, chills, CP, SOB, N/V, severe abdominal pain, VB or LE pain/swelling. Pain well controlled. Diaz catheter in place, not yet ambulating or passing flatus.    Physical exam:    Vital Signs Last 24 Hrs  T(F): 97.5 (10 Dec 2020 16:00), Max: 98 (10 Dec 2020 07:48)  HR: 52 (10 Dec 2020 16:00) (50 - 62)  BP: 133/63 (10 Dec 2020 16:00) (114/69 - 158/70)  RR: 18 (10 Dec 2020 16:00) (16 - 18)  SpO2: 99% (10 Dec 2020 16:00) (99% - 100%)    Gen: alert, oriented  CVS: RRR  Lungs: CTAB  Abdomen: Soft, nontender, non distended. No rebound, rigidity or guarding. Laparoscopic incisions clean, dry, intact  Perineum: no active bleeding. Diaz in place, clear urine  Ext: No calf tenderness    Diet: Regular    MEDICATIONS  (STANDING):  acetaminophen   Tablet .. 975 milliGRAM(s) Oral every 6 hours  amLODIPine   Tablet 2.5 milliGRAM(s) Oral daily  atorvastatin 40 milliGRAM(s) Oral at bedtime  bisacodyl 5 milliGRAM(s) Oral at bedtime  dextrose 40% Gel 15 Gram(s) Oral once  dextrose 5%. 1000 milliLiter(s) (50 mL/Hr) IV Continuous <Continuous>  dextrose 5%. 1000 milliLiter(s) (100 mL/Hr) IV Continuous <Continuous>  dextrose 50% Injectable 25 Gram(s) IV Push once  dextrose 50% Injectable 12.5 Gram(s) IV Push once  dextrose 50% Injectable 25 Gram(s) IV Push once  famotidine    Tablet 20 milliGRAM(s) Oral daily  gabapentin 300 milliGRAM(s) Oral every 8 hours  glucagon  Injectable 1 milliGRAM(s) IntraMuscular once  hydrALAZINE 50 milliGRAM(s) Oral daily  insulin lispro (ADMELOG) corrective regimen sliding scale   SubCutaneous every 4 hours  lactated ringers. 1000 milliLiter(s) (75 mL/Hr) IV Continuous <Continuous>  lactated ringers. 1000 milliLiter(s) (100 mL/Hr) IV Continuous <Continuous>  losartan 100 milliGRAM(s) Oral daily  metoprolol tartrate 100 milliGRAM(s) Oral every 12 hours  simethicone 80 milliGRAM(s) Chew every 6 hours    MEDICATIONS  (PRN):  HYDROmorphone  Injectable 0.5 milliGRAM(s) IV Push every 10 minutes PRN Moderate Pain (4 - 6)  ondansetron Injectable 4 milliGRAM(s) IV Push every 6 hours PRN Nausea and/or Vomiting  oxyCODONE    IR 5 milliGRAM(s) Oral every 6 hours PRN Severe Pain (7 - 10)      LABS:                        10.9   18.99 )-----------( 312      ( 10 Dec 2020 16:40 )             35.6     Magnesium, Serum: 1.9 mg/dL (12-10 @ 16:40)  Antibody Screen: NEG (12-10 @ 08:40)    12-10-20 @ 16:40      136  |  102  |  30<H>  ----------------------------<  122<H>  5.5<H>   |  22  |  1.4        Ca    9.2      10 Dec 2020 16:40  Phos  4.0     12-10  Mg     1.9     12-10      12-10-20 @ 07:01  -  12-10-20 @ 17:47  --------------------------------------------------------  IN: 350 mL / OUT: 500 mL / NET: -150 mL

## 2020-12-10 NOTE — BRIEF OPERATIVE NOTE - OPERATION/FINDINGS
Uterus sounded ot 8 cm, no cervical or vaginal lesions.  On laparoscopy, no upper abdominal disease, no ascites.  Normal adnexae bilaterally, uterus without serosal disease.

## 2020-12-10 NOTE — CHART NOTE - NSCHARTNOTEFT_GEN_A_CORE
PACU ANESTHESIA ADMISSION NOTE      Procedure: Biopsy, omentum, robot-assisted    Hysterectomy, total, robot-assisted, with neoplasm staging      Post op diagnosis:  Endometrial cancer        ____  Intubated  TV:______       Rate: ______      FiO2: ______    __x__  Patent Airway    _x__  Full return of protective reflexes    __x__  Full recovery from anesthesia / back to baseline status    Vitals:  T(C): 36.2 (12-10-20 @ 12:30), Max: 36.7 (12-10-20 @ 07:48)  HR: 52 (12-10-20 @ 13:00) (52 - 62)  BP: 148/67 (12-10-20 @ 13:00) (114/69 - 158/70)  RR: 18 (12-10-20 @ 13:00) (16 - 18)  SpO2: 99% (12-10-20 @ 13:00) (99% - 100%)    Mental Status:  ____ Awake   _____ Alert   ___x__ Drowsy   _____ Sedated    Nausea/Vomiting:  __x__ NO  ______Yes,   See Post - Op Orders          Pain Scale (0-10):  __0___    Treatment: __x__ None    ____ See Post - Op/PCA Orders    Post - Operative Fluids:   ____ Oral   __x__ See Post - Op Orders    Plan: Discharge:   ____Home       ___x__Floor     _____Critical Care    _____  Other:_________________    Comments: uneventful anesthesia course no complications. VItals stable. Pt transferred to PACU

## 2020-12-10 NOTE — BRIEF OPERATIVE NOTE - NSICDXBRIEFPROCEDURE_GEN_ALL_CORE_FT
PROCEDURES:  Biopsy, omentum, robot-assisted 10-Dec-2020 11:48:29  Андрей Humphries  Hysterectomy, total, robot-assisted, with neoplasm staging 10-Dec-2020 11:48:16  Андрей Humphries

## 2020-12-10 NOTE — BRIEF OPERATIVE NOTE - SPECIMENS
1. pelvic washings 2. uterus with cervix and bilateral tubes/ovaries 3. omentum biopsy 4. left pelvic lymph nodes 5. right pelvic lymph nodes 6. right para aortic lymph nodes 7. left para aortic lymph nodes

## 2020-12-11 ENCOUNTER — TRANSCRIPTION ENCOUNTER (OUTPATIENT)
Age: 85
End: 2020-12-11

## 2020-12-11 VITALS
HEART RATE: 59 BPM | SYSTOLIC BLOOD PRESSURE: 131 MMHG | DIASTOLIC BLOOD PRESSURE: 61 MMHG | RESPIRATION RATE: 18 BRPM | TEMPERATURE: 97 F

## 2020-12-11 DIAGNOSIS — Z02.9 ENCOUNTER FOR ADMINISTRATIVE EXAMINATIONS, UNSPECIFIED: ICD-10-CM

## 2020-12-11 LAB
ALBUMIN SERPL ELPH-MCNC: 3 G/DL — LOW (ref 3.5–5.2)
ALP SERPL-CCNC: 52 U/L — SIGNIFICANT CHANGE UP (ref 30–115)
ALT FLD-CCNC: 14 U/L — SIGNIFICANT CHANGE UP (ref 0–41)
ANION GAP SERPL CALC-SCNC: 12 MMOL/L — SIGNIFICANT CHANGE UP (ref 7–14)
AST SERPL-CCNC: 21 U/L — SIGNIFICANT CHANGE UP (ref 0–41)
BASOPHILS # BLD AUTO: 0.08 K/UL — SIGNIFICANT CHANGE UP (ref 0–0.2)
BASOPHILS NFR BLD AUTO: 0.8 % — SIGNIFICANT CHANGE UP (ref 0–1)
BILIRUB SERPL-MCNC: 1 MG/DL — SIGNIFICANT CHANGE UP (ref 0.2–1.2)
BUN SERPL-MCNC: 35 MG/DL — HIGH (ref 10–20)
CALCIUM SERPL-MCNC: 8.7 MG/DL — SIGNIFICANT CHANGE UP (ref 8.5–10.1)
CHLORIDE SERPL-SCNC: 100 MMOL/L — SIGNIFICANT CHANGE UP (ref 98–110)
CO2 SERPL-SCNC: 20 MMOL/L — SIGNIFICANT CHANGE UP (ref 17–32)
CREAT SERPL-MCNC: 1.5 MG/DL — SIGNIFICANT CHANGE UP (ref 0.7–1.5)
EOSINOPHIL # BLD AUTO: 0.05 K/UL — SIGNIFICANT CHANGE UP (ref 0–0.7)
EOSINOPHIL NFR BLD AUTO: 0.5 % — SIGNIFICANT CHANGE UP (ref 0–8)
GLUCOSE BLDC GLUCOMTR-MCNC: 126 MG/DL — HIGH (ref 70–99)
GLUCOSE BLDC GLUCOMTR-MCNC: 135 MG/DL — HIGH (ref 70–99)
GLUCOSE BLDC GLUCOMTR-MCNC: 304 MG/DL — HIGH (ref 70–99)
GLUCOSE BLDC GLUCOMTR-MCNC: 416 MG/DL — HIGH (ref 70–99)
GLUCOSE BLDC GLUCOMTR-MCNC: 430 MG/DL — HIGH (ref 70–99)
GLUCOSE SERPL-MCNC: 135 MG/DL — HIGH (ref 70–99)
HCT VFR BLD CALC: 28 % — LOW (ref 37–47)
HGB BLD-MCNC: 8.5 G/DL — LOW (ref 12–16)
IMM GRANULOCYTES NFR BLD AUTO: 0.2 % — SIGNIFICANT CHANGE UP (ref 0.1–0.3)
LYMPHOCYTES # BLD AUTO: 1.43 K/UL — SIGNIFICANT CHANGE UP (ref 1.2–3.4)
LYMPHOCYTES # BLD AUTO: 14.7 % — LOW (ref 20.5–51.1)
MAGNESIUM SERPL-MCNC: 2 MG/DL — SIGNIFICANT CHANGE UP (ref 1.8–2.4)
MCHC RBC-ENTMCNC: 24.5 PG — LOW (ref 27–31)
MCHC RBC-ENTMCNC: 30.4 G/DL — LOW (ref 32–37)
MCV RBC AUTO: 80.7 FL — LOW (ref 81–99)
MONOCYTES # BLD AUTO: 1.13 K/UL — HIGH (ref 0.1–0.6)
MONOCYTES NFR BLD AUTO: 11.6 % — HIGH (ref 1.7–9.3)
NEUTROPHILS # BLD AUTO: 7.02 K/UL — HIGH (ref 1.4–6.5)
NEUTROPHILS NFR BLD AUTO: 72.2 % — SIGNIFICANT CHANGE UP (ref 42.2–75.2)
NON-GYNECOLOGICAL CYTOLOGY STUDY: SIGNIFICANT CHANGE UP
NRBC # BLD: 0 /100 WBCS — SIGNIFICANT CHANGE UP (ref 0–0)
PHOSPHATE SERPL-MCNC: 4.9 MG/DL — SIGNIFICANT CHANGE UP (ref 2.1–4.9)
PLATELET # BLD AUTO: 268 K/UL — SIGNIFICANT CHANGE UP (ref 130–400)
POTASSIUM SERPL-MCNC: 5.1 MMOL/L — HIGH (ref 3.5–5)
POTASSIUM SERPL-SCNC: 5.1 MMOL/L — HIGH (ref 3.5–5)
PROT SERPL-MCNC: 5.2 G/DL — LOW (ref 6–8)
RBC # BLD: 3.47 M/UL — LOW (ref 4.2–5.4)
RBC # FLD: 19.2 % — HIGH (ref 11.5–14.5)
SODIUM SERPL-SCNC: 132 MMOL/L — LOW (ref 135–146)
WBC # BLD: 9.73 K/UL — SIGNIFICANT CHANGE UP (ref 4.8–10.8)
WBC # FLD AUTO: 9.73 K/UL — SIGNIFICANT CHANGE UP (ref 4.8–10.8)

## 2020-12-11 RX ORDER — OXYCODONE HYDROCHLORIDE 5 MG/1
1 TABLET ORAL
Qty: 14 | Refills: 0
Start: 2020-12-11

## 2020-12-11 RX ORDER — INFLUENZA VIRUS VACCINE 15; 15; 15; 15 UG/.5ML; UG/.5ML; UG/.5ML; UG/.5ML
0.5 SUSPENSION INTRAMUSCULAR ONCE
Refills: 0 | Status: DISCONTINUED | OUTPATIENT
Start: 2020-12-11 | End: 2020-12-11

## 2020-12-11 RX ORDER — SIMETHICONE 80 MG/1
1 TABLET, CHEWABLE ORAL
Qty: 28 | Refills: 0
Start: 2020-12-11 | End: 2020-12-17

## 2020-12-11 RX ORDER — ACETAMINOPHEN 500 MG
3 TABLET ORAL
Qty: 84 | Refills: 0
Start: 2020-12-11 | End: 2020-12-17

## 2020-12-11 RX ORDER — GABAPENTIN 400 MG/1
1 CAPSULE ORAL
Qty: 9 | Refills: 0
Start: 2020-12-11 | End: 2020-12-13

## 2020-12-11 RX ADMIN — Medication 975 MILLIGRAM(S): at 06:01

## 2020-12-11 RX ADMIN — Medication 975 MILLIGRAM(S): at 06:31

## 2020-12-11 RX ADMIN — GABAPENTIN 300 MILLIGRAM(S): 400 CAPSULE ORAL at 14:02

## 2020-12-11 RX ADMIN — Medication 975 MILLIGRAM(S): at 12:07

## 2020-12-11 RX ADMIN — Medication 100 MICROGRAM(S): at 06:02

## 2020-12-11 RX ADMIN — Medication 6: at 14:02

## 2020-12-11 RX ADMIN — SIMETHICONE 80 MILLIGRAM(S): 80 TABLET, CHEWABLE ORAL at 06:01

## 2020-12-11 RX ADMIN — Medication 975 MILLIGRAM(S): at 13:00

## 2020-12-11 RX ADMIN — GABAPENTIN 300 MILLIGRAM(S): 400 CAPSULE ORAL at 06:01

## 2020-12-11 RX ADMIN — Medication 6: at 17:07

## 2020-12-11 RX ADMIN — FAMOTIDINE 20 MILLIGRAM(S): 10 INJECTION INTRAVENOUS at 12:54

## 2020-12-11 RX ADMIN — SIMETHICONE 80 MILLIGRAM(S): 80 TABLET, CHEWABLE ORAL at 12:07

## 2020-12-11 RX ADMIN — Medication 975 MILLIGRAM(S): at 00:10

## 2020-12-11 NOTE — DISCHARGE NOTE PROVIDER - HOSPITAL COURSE
83yo P4, PMHx significant for CKD, DM, HTN, HLD, hypothyroidism s/p RATLH/BSO, pelvic/PA LND, omentectomy, pelvic washings, EBL 25cc for serous endometrial cancer. Admitted for overnight monitoring in view of age and comorbidities. Patient recovered well postop, voiding, ambulating, tolerating regular diet, hence d/vikas home on POD1.

## 2020-12-11 NOTE — DISCHARGE NOTE NURSING/CASE MANAGEMENT/SOCIAL WORK - NSTRANSFERBELONGINGSDISPO_GEN_A_NUR
0900 Prior notification of patient's arrival for  medical induction from [place] [id number] 
 telephone  orders obtained. Wolfgang Huertas SUSHIL Bianchi RN,on [employer] in room 3686. Report consisted of patients Situation, Background, Assessment and Recommendations(SBAR). Information from the following report(s) SBAR, Kardex, Intake/Output, MAR and Recent Results were reviewed with the receiving nurse. Opportunity for questions and clarification was provided,care assumed. with patient

## 2020-12-11 NOTE — MEDICAL STUDENT PROGRESS NOTE(EDUCATION) - SUBJECTIVE AND OBJECTIVE BOX
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     Interval History:   Pt was seen and evaluated at bedside. Pain is well controlled. Pt denies any vaginal bleeding, vaginal discharge. Pt denies chest pain, SOB, palpitations, abdominal pain, dizziness and lightheadedness Pt denies fevers, chills, diarrhea, nausea, vomiting and dysuria.  Pt has not yet ambulated.     Physical Exam:  ICU Vital Signs Last 24 Hrs  T(C): 36.1 (11 Dec 2020 05:00), Max: 37 (10 Dec 2020 23:00)  T(F): 97 (11 Dec 2020 05:00), Max: 98.6 (10 Dec 2020 23:00)  HR: 56 (11 Dec 2020 05:00) (50 - 68)  BP: 150/63 (11 Dec 2020 05:00) (108/53 - 158/70)  BP(mean): --  ABP: --  ABP(mean): --  RR: 18 (11 Dec 2020 05:00) (12 - 18)  SpO2: 97% (11 Dec 2020 00:45) (97% - 100%)    Gen: AAOx3, NAD  Heart: Normal S1,S2. Regular rhythm and rate.   Lungs: Clear to ascultation bilaterally. No rales, crackles or wheezing.   Abd: Soft, nontender, nondistended. Bowel sounds present.   Pelvic: no bleeding. Dressings in place, clean, dry and intact.  Extremities: No calf tenderness. No swelling. Good peripheral perfusion.   Psychiatric: Cooperative. Appropriate mood and affect.     Labs:                         10.9   18.99 )-----------( 312      ( 10 Dec 2020 16:40 )             35.6     12-10    136  |  102  |  30<H>  ----------------------------<  122<H>  5.5<H>   |  22  |  1.4    Ca    9.2      10 Dec 2020 16:40  Phos  4.0     12-10  Mg     1.9     12-10    I&O Data: 8601-0910 (min 27cc/hr) : 280cc/7h -> 40cc/hr    Medications:  MEDICATIONS  (STANDING):  acetaminophen   Tablet .. 975 milliGRAM(s) Oral every 6 hours  amLODIPine   Tablet 2.5 milliGRAM(s) Oral daily  atorvastatin 40 milliGRAM(s) Oral at bedtime  bisacodyl 5 milliGRAM(s) Oral at bedtime  dextrose 40% Gel 15 Gram(s) Oral once  dextrose 5%. 1000 milliLiter(s) (50 mL/Hr) IV Continuous <Continuous>  dextrose 5%. 1000 milliLiter(s) (100 mL/Hr) IV Continuous <Continuous>  dextrose 50% Injectable 25 Gram(s) IV Push once  dextrose 50% Injectable 12.5 Gram(s) IV Push once  dextrose 50% Injectable 25 Gram(s) IV Push once  famotidine    Tablet 20 milliGRAM(s) Oral daily  gabapentin 300 milliGRAM(s) Oral every 8 hours  glucagon  Injectable 1 milliGRAM(s) IntraMuscular once  hydrALAZINE 50 milliGRAM(s) Oral daily  influenza   Vaccine 0.5 milliLiter(s) IntraMuscular once  insulin lispro (ADMELOG) corrective regimen sliding scale   SubCutaneous every 4 hours  lactated ringers. 1000 milliLiter(s) (100 mL/Hr) IV Continuous <Continuous>  levothyroxine 100 MICROGram(s) Oral daily  losartan 100 milliGRAM(s) Oral daily  metoprolol tartrate 100 milliGRAM(s) Oral every 12 hours  simethicone 80 milliGRAM(s) Chew every 6 hours    MEDICATIONS  (PRN):  ondansetron Injectable 4 milliGRAM(s) IV Push every 6 hours PRN Nausea and/or Vomiting  oxyCODONE    IR 5 milliGRAM(s) Oral every 6 hours PRN Severe Pain (7 - 10)

## 2020-12-11 NOTE — DISCHARGE NOTE PROVIDER - NSDCMRMEDTOKEN_GEN_ALL_CORE_FT
acetaminophen 325 mg oral tablet: 3 tab(s) orally every 6 hours  Aspir 81 oral delayed release tablet: 1 tab(s) orally once a day  atorvastatin 40 mg oral tablet: 1 tab(s) orally once a day  Centrum:   CoQ10 300 mg oral capsule: 1 cap(s) orally once a day  gabapentin 300 mg oral capsule: 1 cap(s) orally every 8 hours  glimepiride 1 mg oral tablet: 1 tab(s) orally once a day  hydrALAZINE: 1 tab(s) orally once a day  Januvia 100 mg oral tablet: 1 tab(s) orally once a day  levothyroxine 100 mcg (0.1 mg) oral tablet: 1 tab(s) orally once a day  magnesium chloride 64 mg oral tablet, extended release: 1 tab(s) orally once a day  metFORMIN 1000 mg oral tablet: 1 tab(s) orally 2 times a day  metoprolol tartrate 100 mg oral tablet: 1 tab(s) orally 2 times a day  oxyCODONE 5 mg oral tablet: 1 tab(s) orally every 6 hours, As needed, Severe Pain (7 - 10) MDD:4  simethicone 80 mg oral tablet, chewable: 1 tab(s) orally every 6 hours  telmisartan 80 mg oral tablet: 1 tab(s) orally once a day  Vitamin B12 1000 mcg oral tablet: 1 tab(s) orally once a day  Vitamin D3:

## 2020-12-11 NOTE — DISCHARGE NOTE PROVIDER - NSDCCPCAREPLAN_GEN_ALL_CORE_FT
PRINCIPAL DISCHARGE DIAGNOSIS  Diagnosis: Uterine cancer  Assessment and Plan of Treatment: s.p robotic hysterectomy

## 2020-12-11 NOTE — MEDICAL STUDENT PROGRESS NOTE(EDUCATION) - NS MD HP STUD ASPLAN PLAN FT
- pain management   - monitor vitals   - encourage ambulation  - encourage incentive spirometry.   - regular diet/IV hydrations  - Joe discontinued  - SCDs for DVT prophylaxis

## 2020-12-11 NOTE — PROGRESS NOTE ADULT - ASSESSMENT
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 Humphries

## 2020-12-11 NOTE — MEDICAL STUDENT PROGRESS NOTE(EDUCATION) - NS MD HP STUD ASPLAN ASSES FT
83yo P4, PMH of CKD, DM, HTN, HLD, hypothyroidism, s/p RATLH/BSO, pelvic/PA LND, omentectomy, pelvic washings EBL 25cc for serous endometrial cancer procedure on 12/10/20, now post-op day #1, doing well.

## 2020-12-11 NOTE — DISCHARGE NOTE NURSING/CASE MANAGEMENT/SOCIAL WORK - PATIENT PORTAL LINK FT
You can access the FollowMyHealth Patient Portal offered by [facility] by registering at the following website: [email or web address]. By joining [employer]’s FollowMyHealth portal, you will also be able to view your health information using other applications (apps) compatible with our system.

## 2020-12-11 NOTE — DISCHARGE NOTE PROVIDER - NSDCFUADDINST_GEN_ALL_CORE_FT
Nothing in the vagina for 6-8 weeks (no sex, no tampons, no douching, no swimming pools). Avoid tub baths, you may shower.   Please keep incisions clean and dry.     If you have a fever of 100.4F or greater, severe vaginal bleeding, or severe abdominal pain, call your Ob/Gyn or come to the emergency department immediately.

## 2020-12-11 NOTE — PROGRESS NOTE ADULT - SUBJECTIVE AND OBJECTIVE BOX
LORETTA SALCEDO  84y  Female    PGY4 Note:    Pt seen and evaluated at bedside. Pain well controlled, denies vaginal bleeding/foul smelling vaginal discharge. Denies dizziness/lightheadedness/CP/SOB/palpitations. Denies fever, chills, nausea/vomiting, diarrhea, dysuria, LE pain.     Ambulating: No  Voiding: Diaz d/vikas  Flatus: No  Bowel movements: No    Diet: Regular    Physical Exam  Vital Signs Last 24 Hrs  T(C): 36.4 (11 Dec 2020 05:15), Max: 37 (10 Dec 2020 23:00)  T(F): 97.5 (11 Dec 2020 05:15), Max: 98.6 (10 Dec 2020 23:00)  HR: 55 (11 Dec 2020 05:15) (50 - 68)  BP: 115/53 (11 Dec 2020 05:15) (108/53 - 158/70)  RR: 18 (11 Dec 2020 05:15) (12 - 18)  SpO2: 97% (11 Dec 2020 00:45) (97% - 100%)    Gen: AAOx3, NAD  Heart: RRR, S1S2+  Lungs: CTA B/L, no r/r/w   Wound: dermabond in place- c/d/i   Abd: Soft, nontender, nondistended, BS+  Pelvic: no bleeding on chux  Ext: No calf tenderness, no swelling; SCDs in place    3317-8334 (min 27cc/hr): 280cc/7h-> 40cc/hr    Labs: 12/10 /109/115                         10.9   18.99 )-----------( 312      ( 10 Dec 2020 16:40 )             35.6      12-10    136  |  102  |  30<H>  ----------------------------<  122<H>  5.5<H>   |  22  |  1.4    Ca    9.2      10 Dec 2020 16:40  Phos  4.0     12-10  Mg     1.9     12-10    MEDICATIONS  (STANDING):  acetaminophen   Tablet .. 975 milliGRAM(s) Oral every 6 hours  amLODIPine   Tablet 2.5 milliGRAM(s) Oral daily  atorvastatin 40 milliGRAM(s) Oral at bedtime  bisacodyl 5 milliGRAM(s) Oral at bedtime  dextrose 40% Gel 15 Gram(s) Oral once  dextrose 5%. 1000 milliLiter(s) (50 mL/Hr) IV Continuous <Continuous>  dextrose 5%. 1000 milliLiter(s) (100 mL/Hr) IV Continuous <Continuous>  dextrose 50% Injectable 25 Gram(s) IV Push once  dextrose 50% Injectable 12.5 Gram(s) IV Push once  dextrose 50% Injectable 25 Gram(s) IV Push once  famotidine    Tablet 20 milliGRAM(s) Oral daily  gabapentin 300 milliGRAM(s) Oral every 8 hours  glucagon  Injectable 1 milliGRAM(s) IntraMuscular once  hydrALAZINE 50 milliGRAM(s) Oral daily  insulin lispro (ADMELOG) corrective regimen sliding scale   SubCutaneous every 4 hours  lactated ringers. 1000 milliLiter(s) (100 mL/Hr) IV Continuous <Continuous>  levothyroxine 100 MICROGram(s) Oral daily  losartan 100 milliGRAM(s) Oral daily  metoprolol tartrate 100 milliGRAM(s) Oral every 12 hours  simethicone 80 milliGRAM(s) Chew every 6 hours    MEDICATIONS  (PRN):  ondansetron Injectable 4 milliGRAM(s) IV Push every 6 hours PRN Nausea and/or Vomiting  oxyCODONE    IR 5 milliGRAM(s) Oral every 6 hours PRN Severe Pain (7 - 10)

## 2020-12-15 LAB — SURGICAL PATHOLOGY STUDY: SIGNIFICANT CHANGE UP

## 2020-12-29 ENCOUNTER — OUTPATIENT (OUTPATIENT)
Dept: OUTPATIENT SERVICES | Facility: HOSPITAL | Age: 85
LOS: 1 days | Discharge: HOME | End: 2020-12-29

## 2020-12-29 ENCOUNTER — APPOINTMENT (OUTPATIENT)
Dept: GYNECOLOGIC ONCOLOGY | Facility: CLINIC | Age: 85
End: 2020-12-29
Payer: MEDICARE

## 2020-12-29 VITALS
SYSTOLIC BLOOD PRESSURE: 127 MMHG | TEMPERATURE: 97.6 F | DIASTOLIC BLOOD PRESSURE: 61 MMHG | BODY MASS INDEX: 20.55 KG/M2 | RESPIRATION RATE: 18 BRPM | WEIGHT: 116 LBS | HEART RATE: 56 BPM | HEIGHT: 63 IN

## 2020-12-29 DIAGNOSIS — Z98.890 OTHER SPECIFIED POSTPROCEDURAL STATES: Chronic | ICD-10-CM

## 2020-12-29 PROCEDURE — 99213 OFFICE O/P EST LOW 20 MIN: CPT

## 2021-01-05 NOTE — ASSESSMENT
[FreeTextEntry1] : 84yo s/p RATLH/BSO, pelvic/PA LND, omentectomy, pelvic washings for stage IA serous endometrial cancer  \par \par - pathology discussed, all questions answered \par - discussed observation vs. adjuvant vaginal brachytherapy; will proceed with observation\par - continue with pelvic rest, f/u in 4 weeks \par - health maintenance

## 2021-01-05 NOTE — HISTORY OF PRESENT ILLNESS
[FreeTextEntry1] : 84yo here for postoperative visit \par \par Surgery (12/10/20): RATLH/BSO, pelvic/PA LND, omentectomy, pelvic washings\par Findings: Uterus sounded to 8 cm, no cervical or vaginal lesions.  On laparoscopy, no upper abdominal disease, no ascites.  Normal adnexae bilaterally, uterus without serosal disease.\par Pathology: \par PELVIC WASH\par NEGATIVE FOR MALIGNANT CELLS.\par Cytology slide displays mesothelial cells, small lymphocytes and histiocytes.\par Final Diagnosis\par 1. Uterus, cervix, bilateral tubes and ovaries:\par - Uterus: Cystic atrophy of the endometrium,  large benign endometrial polyp, adenomyosis and leiomyomas, some with calcifications.\par - Right and left ovary: Stromal hyperplasia.\par - Bilateral  Fallopian tubes with no significant diagnostic abnormality.\par \par Comment: No evidence of serous/ serous endometrial intraepithelial carcinoma in the submitted material..\par Previous slides 76 CS 20 [id number] have been reviewed.\par \par 2. Omentum, biopsy:\par - Omentum showing congestion, fibrosis.\par \par 3. Left pelvic lymph node:\par - Eight benign lymph nodes.\par \par 4. Right pelvic lymph node:\par - Eleven small benign lymph nodes.\par \par 5. Right para-aortic lymph node:\par - Twelve lymph nodes, negative for tumor.\par \par 6. Left para-aortic lymph node:\par - Five lymph nodes, negative for tumor.\par \par Denies acute complaints today. Reports fatigue otherwise denies fever, chills, vomiting, abdominal pain, vaginal bleeding, weight loss/loss of appetite, urinary incontinence, change in bowel habits, hematuria. Voiding, ambulating, tolerating regular diet, passing flatus.

## 2021-01-05 NOTE — PHYSICAL EXAM
[Normal] : General appearance: No acute distress, well appearing and well nourished [de-identified] : soft, nontender, no r/g/r; well healing laparoscopic incisions

## 2021-01-13 DIAGNOSIS — C54.1 MALIGNANT NEOPLASM OF ENDOMETRIUM: ICD-10-CM

## 2021-01-19 ENCOUNTER — APPOINTMENT (OUTPATIENT)
Dept: GYNECOLOGIC ONCOLOGY | Facility: CLINIC | Age: 86
End: 2021-01-19
Payer: MEDICARE

## 2021-01-19 ENCOUNTER — OUTPATIENT (OUTPATIENT)
Dept: OUTPATIENT SERVICES | Facility: HOSPITAL | Age: 86
LOS: 1 days | Discharge: HOME | End: 2021-01-19

## 2021-01-19 VITALS
BODY MASS INDEX: 20.55 KG/M2 | HEART RATE: 68 BPM | TEMPERATURE: 98 F | DIASTOLIC BLOOD PRESSURE: 81 MMHG | WEIGHT: 116 LBS | RESPIRATION RATE: 18 BRPM | SYSTOLIC BLOOD PRESSURE: 161 MMHG | HEIGHT: 63 IN

## 2021-01-19 DIAGNOSIS — Z98.890 OTHER SPECIFIED POSTPROCEDURAL STATES: Chronic | ICD-10-CM

## 2021-01-19 PROCEDURE — 99024 POSTOP FOLLOW-UP VISIT: CPT

## 2021-01-19 NOTE — PHYSICAL EXAM
[Absent] : Adnexa(ae): Absent [Normal] : Bimanual Exam: Normal [de-identified] : soft, non tender, non distended, no R/G/R, no palpable masses. Laparoscopic incisions healing well.  [de-identified] : Speculum: no lesions/bleeding/discharge. Cuff intact, non tender, no masses felt.

## 2021-01-19 NOTE — ASSESSMENT
[FreeTextEntry1] : 86yo s/p RATLH/BSO, pelvic/PA LND, omentectomy, pelvic washings for stage IA serous endometrial cancer, doing well post-operatively,\par -No evidence of recurrent disease\par -Signs and symptoms of recurrence discussed\par -f/u in 3 months for surveillance\par - health maintenance with PCP (colonoscopy, mammogram), and follow up on BP recommended.

## 2021-01-19 NOTE — HISTORY OF PRESENT ILLNESS
[FreeTextEntry1] : 86 yo with stage 1A serous endometrial cancer, s/p RATLH-BSO, staging, presents for post-opertaive visit. Doing well today, no complaints. Denies weight changes, fevers, chills, headaches, CP, SOB, abd pain, N/V/D, dysuria, hematuria, vaginal bleeding/discharge. \par \par Diagnosis:  Stage 1A serous endometrial carcinoma\par Surgery: 12/10/2020 - RATLH-BSO, staging\par Adjuvant: Observation (discussed radiation vs observation - pt elected observation)

## 2021-01-22 DIAGNOSIS — C54.1 MALIGNANT NEOPLASM OF ENDOMETRIUM: ICD-10-CM

## 2021-01-28 DIAGNOSIS — N83.8 OTHER NONINFLAMMATORY DISORDERS OF OVARY, FALLOPIAN TUBE AND BROAD LIGAMENT: ICD-10-CM

## 2021-01-28 DIAGNOSIS — N80.0 ENDOMETRIOSIS OF UTERUS: ICD-10-CM

## 2021-01-28 DIAGNOSIS — E78.00 PURE HYPERCHOLESTEROLEMIA, UNSPECIFIED: ICD-10-CM

## 2021-01-28 DIAGNOSIS — I10 ESSENTIAL (PRIMARY) HYPERTENSION: ICD-10-CM

## 2021-01-28 DIAGNOSIS — N84.0 POLYP OF CORPUS UTERI: ICD-10-CM

## 2021-01-28 DIAGNOSIS — C54.1 MALIGNANT NEOPLASM OF ENDOMETRIUM: ICD-10-CM

## 2021-01-28 DIAGNOSIS — E11.9 TYPE 2 DIABETES MELLITUS WITHOUT COMPLICATIONS: ICD-10-CM

## 2021-03-09 ENCOUNTER — APPOINTMENT (OUTPATIENT)
Dept: CARDIOLOGY | Facility: CLINIC | Age: 86
End: 2021-03-09
Payer: MEDICARE

## 2021-03-09 ENCOUNTER — OUTPATIENT (OUTPATIENT)
Dept: OUTPATIENT SERVICES | Facility: HOSPITAL | Age: 86
LOS: 1 days | Discharge: HOME | End: 2021-03-09
Payer: MEDICARE

## 2021-03-09 VITALS
BODY MASS INDEX: 19.4 KG/M2 | TEMPERATURE: 96.3 F | SYSTOLIC BLOOD PRESSURE: 133 MMHG | DIASTOLIC BLOOD PRESSURE: 56 MMHG | HEIGHT: 63 IN | WEIGHT: 109.5 LBS | OXYGEN SATURATION: 99 % | HEART RATE: 74 BPM

## 2021-03-09 DIAGNOSIS — Z98.890 OTHER SPECIFIED POSTPROCEDURAL STATES: Chronic | ICD-10-CM

## 2021-03-09 PROCEDURE — 99214 OFFICE O/P EST MOD 30 MIN: CPT

## 2021-03-09 PROCEDURE — 93010 ELECTROCARDIOGRAM REPORT: CPT

## 2021-03-09 RX ORDER — CARBIDOPA AND LEVODOPA 25; 100 MG/1; MG/1
25-100 TABLET ORAL
Refills: 0 | Status: ACTIVE | COMMUNITY

## 2021-03-09 RX ORDER — AMLODIPINE BESYLATE 2.5 MG/1
2.5 TABLET ORAL DAILY
Qty: 30 | Refills: 3 | Status: DISCONTINUED | COMMUNITY
Start: 2020-12-01 | End: 2021-03-09

## 2021-03-09 RX ORDER — LEVOTHYROXINE SODIUM 0.1 MG/1
100 TABLET ORAL
Refills: 0 | Status: ACTIVE | COMMUNITY

## 2021-03-09 NOTE — REASON FOR VISIT
[Follow-Up - Clinic] : a clinic follow-up of [Hypertension] : hypertension [FreeTextEntry1] : 85 year old w/ PMHx of DM, hypothyroidism, asthma, Parkinsons disease,  RUL lung adenocarcinoma presenting for follow up. Patient reports feeling at her baseline health and has no complaints today. She was referred previously clearance for gyn surgery. Her BP in the office today is 133/56. On her last visit she had uncontrolled BP in the 180s systolic.  She states that her blood pressure fluctuates between  systolic, but she states she sometimes have diastolic pressures that exceed her systolic. She had Stage 1A serous endometrial carcinoma s/p  [facility]-BSO 12/10/2020.  She has no complaints today.

## 2021-03-09 NOTE — ASSESSMENT
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 \par \par #) Hypertension\par -/56 today, fluctuates at home, between 100-200 systolic \par - D/C hydralazine  4 times daily, d/c norvasc 2.5 daily, start Nifedipine 30 mg \par -RTC 2 months \par \par #) PVCs, Bigeminy\par -Noted on EKG 03/09/2021\par -Obtain TTE\par -Holter 2 week extended \par \par #) Systolic Murmur\par -Check TTE, eval for AS \par \par I was physically present for the key portions of the evaluation and management (E/M) service provided.  I agree with the above history, physical, and plan which I have reviewed and edited where appropriate.  This was reviewed with the medical resident (Dr. Case).\par  Methotrexate Pregnancy And Lactation Text: This medication is Pregnancy Category X and is known to cause fetal harm. This medication is excreted in breast milk.

## 2021-03-09 NOTE — PHYSICAL EXAM
[General Appearance - Well Developed] : well developed [General Appearance - Well Nourished] : well nourished [Normal Conjunctiva] : the conjunctiva exhibited no abnormalities [Eyelids - No Xanthelasma] : the eyelids demonstrated no xanthelasmas [Respiration, Rhythm And Depth] : normal respiratory rhythm and effort [Exaggerated Use Of Accessory Muscles For Inspiration] : no accessory muscle use [Heart Rate And Rhythm] : heart rate and rhythm were normal [Heart Sounds] : normal S1 and S2 [Arterial Pulses Normal] : the arterial pulses were normal [Edema] : no peripheral edema present [Bowel Sounds] : normal bowel sounds [Abdomen Soft] : soft [Abdomen Tenderness] : non-tender [Normal Oral Mucosa] : normal oral mucosa [No Oral Pallor] : no oral pallor [No Oral Cyanosis] : no oral cyanosis [Nail Clubbing] : no clubbing of the fingernails [Cyanosis, Localized] : no localized cyanosis [Petechial Hemorrhages (___cm)] : no petechial hemorrhages [] : no ischemic changes [Normal Jugular Venous A Waves Present] : normal jugular venous A waves present [Normal Jugular Venous V Waves Present] : normal jugular venous V waves present [No Jugular Venous Richards A Waves] : no jugular venous richards A waves [Oriented To Time, Place, And Person] : oriented to person, place, and time [Affect] : the affect was normal [Mood] : the mood was normal [No Anxiety] : not feeling anxious [FreeTextEntry1] : Shuffling gait

## 2021-03-13 ENCOUNTER — OUTPATIENT (OUTPATIENT)
Dept: OUTPATIENT SERVICES | Facility: HOSPITAL | Age: 86
LOS: 1 days | Discharge: HOME | End: 2021-03-13

## 2021-03-13 ENCOUNTER — LABORATORY RESULT (OUTPATIENT)
Age: 86
End: 2021-03-13

## 2021-03-13 DIAGNOSIS — Z98.890 OTHER SPECIFIED POSTPROCEDURAL STATES: Chronic | ICD-10-CM

## 2021-03-13 DIAGNOSIS — Z11.59 ENCOUNTER FOR SCREENING FOR OTHER VIRAL DISEASES: ICD-10-CM

## 2021-03-16 ENCOUNTER — OUTPATIENT (OUTPATIENT)
Dept: OUTPATIENT SERVICES | Facility: HOSPITAL | Age: 86
LOS: 1 days | Discharge: HOME | End: 2021-03-16
Payer: MEDICARE

## 2021-03-16 ENCOUNTER — NON-APPOINTMENT (OUTPATIENT)
Age: 86
End: 2021-03-16

## 2021-03-16 DIAGNOSIS — I10 ESSENTIAL (PRIMARY) HYPERTENSION: ICD-10-CM

## 2021-03-16 DIAGNOSIS — Z98.890 OTHER SPECIFIED POSTPROCEDURAL STATES: Chronic | ICD-10-CM

## 2021-03-16 PROCEDURE — 93306 TTE W/DOPPLER COMPLETE: CPT | Mod: 26

## 2021-04-20 ENCOUNTER — OUTPATIENT (OUTPATIENT)
Dept: OUTPATIENT SERVICES | Facility: HOSPITAL | Age: 86
LOS: 1 days | Discharge: HOME | End: 2021-04-20

## 2021-04-20 ENCOUNTER — APPOINTMENT (OUTPATIENT)
Dept: GYNECOLOGIC ONCOLOGY | Facility: CLINIC | Age: 86
End: 2021-04-20
Payer: MEDICARE

## 2021-04-20 VITALS
TEMPERATURE: 98.6 F | HEART RATE: 58 BPM | WEIGHT: 111 LBS | DIASTOLIC BLOOD PRESSURE: 71 MMHG | RESPIRATION RATE: 18 BRPM | BODY MASS INDEX: 19.67 KG/M2 | SYSTOLIC BLOOD PRESSURE: 142 MMHG | HEIGHT: 63 IN

## 2021-04-20 DIAGNOSIS — Z98.890 OTHER SPECIFIED POSTPROCEDURAL STATES: Chronic | ICD-10-CM

## 2021-04-20 PROCEDURE — 99214 OFFICE O/P EST MOD 30 MIN: CPT

## 2021-04-22 NOTE — HISTORY OF PRESENT ILLNESS
[FreeTextEntry1] : 84 yo here for surveillance\par \par Diagnosis: Stage 1A serous endometrial carcinoma on endometrial biopsy, no cancer found in hysterectomy specimen\par Surgery: 12/10/2020 - RATLH-BSO, staging\par Adjuvant treatment: None\par \par Today, she has no acute complaints. She denies fevers, chills, loss of appetite, pain, vaginal bleeding, urinary incontinence, or hematuria.

## 2021-04-22 NOTE — DISCUSSION/SUMMARY
[FreeTextEntry1] : 84 yo with stage 1A serous endometrial carcinoma, s/p RATLH/BSO, pelvic & para-aortic lymphadenectomy, omentectomy, pelvic washings, no evidence of disease\par \par - Signs and symptoms of recurrence discussed\par - Discussed health maintenance (mammogram and colonoscopy), patient to followup with her PCP for these\par - Return to see me in 3 months for surveillance

## 2021-04-27 DIAGNOSIS — C54.1 MALIGNANT NEOPLASM OF ENDOMETRIUM: ICD-10-CM

## 2021-05-11 ENCOUNTER — OUTPATIENT (OUTPATIENT)
Dept: OUTPATIENT SERVICES | Facility: HOSPITAL | Age: 86
LOS: 1 days | Discharge: HOME | End: 2021-05-11
Payer: MEDICARE

## 2021-05-11 ENCOUNTER — APPOINTMENT (OUTPATIENT)
Dept: CARDIOLOGY | Facility: CLINIC | Age: 86
End: 2021-05-11
Payer: MEDICARE

## 2021-05-11 VITALS
HEART RATE: 71 BPM | HEIGHT: 63 IN | TEMPERATURE: 97.8 F | WEIGHT: 108 LBS | BODY MASS INDEX: 19.14 KG/M2 | OXYGEN SATURATION: 97 % | SYSTOLIC BLOOD PRESSURE: 96 MMHG | DIASTOLIC BLOOD PRESSURE: 58 MMHG

## 2021-05-11 DIAGNOSIS — D64.9 ANEMIA, UNSPECIFIED: ICD-10-CM

## 2021-05-11 DIAGNOSIS — Z98.890 OTHER SPECIFIED POSTPROCEDURAL STATES: Chronic | ICD-10-CM

## 2021-05-11 PROCEDURE — 93010 ELECTROCARDIOGRAM REPORT: CPT | Mod: 76

## 2021-05-11 PROCEDURE — 99214 OFFICE O/P EST MOD 30 MIN: CPT

## 2021-05-11 RX ORDER — ASPIRIN 81 MG
81 TABLET, DELAYED RELEASE (ENTERIC COATED) ORAL
Refills: 0 | Status: DISCONTINUED | COMMUNITY
End: 2021-05-11

## 2021-05-11 NOTE — REASON FOR VISIT
[Follow-Up - Clinic] : a clinic follow-up of [Hypertension] : hypertension [FreeTextEntry1] : 85 year old w/ PMHx of DM, hypothyroidism, asthma, Parkinsons disease,  RUL lung adenocarcinoma presenting for follow up. Patient reports feeling at her baseline health and has no complaints today. BP meds were adjusted last visit. \par EKG showed new onset of a flutter\par BP 96/58 today repeat 101/61\par HR 75

## 2021-05-11 NOTE — ASSESSMENT
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 \par \par #New onset of a flutter\par - started eliquis. d/c aspirin\par - ordered TSH, CMP, CBC\par -discussed with son in room \par \par #Anemia\par - Ordered CBC\par \par #Hypertension\par - BP 96/58 today repeat 101/61 HR 75\par - c/w Nifedipine 30 mg, metoprolol 100 mg BID\par - Hold nifedipine if BP is low at home. Continue metprolol as long as SBP >= 100\par \par #NSVT\par - Ordered nuclear stress test\par - TTE showed normal ventricular function, mild AS, moderate TVR\par \par #PVCs, Bigeminy\par -Noted on EKG 03/09/2021\par -Holter 03/2021 showed bigeminy, 2 episodes of NSVT with max of 7 beats, SVT with max of 11 beats\par \par RTC in 3 months

## 2021-05-11 NOTE — PHYSICAL EXAM
[General Appearance - Well Developed] : well developed [General Appearance - Well Nourished] : well nourished [Normal Conjunctiva] : the conjunctiva exhibited no abnormalities [Eyelids - No Xanthelasma] : the eyelids demonstrated no xanthelasmas [Normal Oral Mucosa] : normal oral mucosa [No Oral Pallor] : no oral pallor [No Oral Cyanosis] : no oral cyanosis [Normal Jugular Venous A Waves Present] : normal jugular venous A waves present [Normal Jugular Venous V Waves Present] : normal jugular venous V waves present [No Jugular Venous Richards A Waves] : no jugular venous richards A waves [Respiration, Rhythm And Depth] : normal respiratory rhythm and effort [Exaggerated Use Of Accessory Muscles For Inspiration] : no accessory muscle use [Heart Rate And Rhythm] : heart rate and rhythm were normal [Heart Sounds] : normal S1 and S2 [Arterial Pulses Normal] : the arterial pulses were normal [Edema] : no peripheral edema present [Bowel Sounds] : normal bowel sounds [Abdomen Soft] : soft [Abdomen Tenderness] : non-tender [Nail Clubbing] : no clubbing of the fingernails [Cyanosis, Localized] : no localized cyanosis [Petechial Hemorrhages (___cm)] : no petechial hemorrhages [] : no ischemic changes [Oriented To Time, Place, And Person] : oriented to person, place, and time [Affect] : the affect was normal [Mood] : the mood was normal [No Anxiety] : not feeling anxious [FreeTextEntry1] : Shuffling gait

## 2021-06-04 ENCOUNTER — RX RENEWAL (OUTPATIENT)
Age: 86
End: 2021-06-04

## 2021-08-03 ENCOUNTER — OUTPATIENT (OUTPATIENT)
Dept: OUTPATIENT SERVICES | Facility: HOSPITAL | Age: 86
LOS: 1 days | Discharge: HOME | End: 2021-08-03

## 2021-08-03 ENCOUNTER — APPOINTMENT (OUTPATIENT)
Dept: GYNECOLOGIC ONCOLOGY | Facility: CLINIC | Age: 86
End: 2021-08-03
Payer: MEDICARE

## 2021-08-03 VITALS
DIASTOLIC BLOOD PRESSURE: 82 MMHG | HEART RATE: 69 BPM | HEIGHT: 63 IN | SYSTOLIC BLOOD PRESSURE: 149 MMHG | BODY MASS INDEX: 18.07 KG/M2 | TEMPERATURE: 97.2 F | WEIGHT: 102 LBS

## 2021-08-03 DIAGNOSIS — N76.0 ACUTE VAGINITIS: ICD-10-CM

## 2021-08-03 DIAGNOSIS — Z98.890 OTHER SPECIFIED POSTPROCEDURAL STATES: Chronic | ICD-10-CM

## 2021-08-03 PROCEDURE — 99213 OFFICE O/P EST LOW 20 MIN: CPT

## 2021-08-10 ENCOUNTER — RX RENEWAL (OUTPATIENT)
Age: 86
End: 2021-08-10

## 2021-08-12 DIAGNOSIS — C54.1 MALIGNANT NEOPLASM OF ENDOMETRIUM: ICD-10-CM

## 2021-08-12 DIAGNOSIS — N76.0 ACUTE VAGINITIS: ICD-10-CM

## 2021-09-14 ENCOUNTER — APPOINTMENT (OUTPATIENT)
Dept: GYNECOLOGIC ONCOLOGY | Facility: CLINIC | Age: 86
End: 2021-09-14

## 2021-09-21 ENCOUNTER — APPOINTMENT (OUTPATIENT)
Dept: CARDIOLOGY | Facility: CLINIC | Age: 86
End: 2021-09-21

## 2021-09-29 ENCOUNTER — RX RENEWAL (OUTPATIENT)
Age: 86
End: 2021-09-29

## 2021-10-12 ENCOUNTER — OUTPATIENT (OUTPATIENT)
Dept: OUTPATIENT SERVICES | Facility: HOSPITAL | Age: 86
LOS: 1 days | Discharge: HOME | End: 2021-10-12

## 2021-10-12 ENCOUNTER — APPOINTMENT (OUTPATIENT)
Dept: GYNECOLOGIC ONCOLOGY | Facility: CLINIC | Age: 86
End: 2021-10-12
Payer: MEDICARE

## 2021-10-12 VITALS
WEIGHT: 102 LBS | HEIGHT: 63 IN | BODY MASS INDEX: 18.07 KG/M2 | TEMPERATURE: 98 F | RESPIRATION RATE: 20 BRPM | DIASTOLIC BLOOD PRESSURE: 75 MMHG | HEART RATE: 72 BPM | SYSTOLIC BLOOD PRESSURE: 129 MMHG

## 2021-10-12 DIAGNOSIS — Z98.890 OTHER SPECIFIED POSTPROCEDURAL STATES: Chronic | ICD-10-CM

## 2021-10-12 PROCEDURE — 99213 OFFICE O/P EST LOW 20 MIN: CPT

## 2021-10-14 NOTE — DISCUSSION/SUMMARY
[FreeTextEntry1] : 84 yo with stage 1A serous endometrial carcinoma on biopsy, s/p RATLH/BSO, pelvic & para-aortic lymphadenectomy, omentectomy, pelvic washings, surgery pathology negative, no evidence of disease\par \par - Signs and symptoms of recurrence discussed\par - Discussed health maintenance (mammogram and colonoscopy), patient to followup with her PCP for these\par - Return to see me in 3 months for surveillance

## 2021-10-14 NOTE — HISTORY OF PRESENT ILLNESS
[FreeTextEntry1] : 86 yo here for surveillance\par \par Diagnosis: Stage 1A serous endometrial carcinoma on endometrial biopsy, no cancer found in hysterectomy specimen\par Surgery: 12/10/2020 - RATLH-BSO, staging\par Adjuvant treatment: None\par \par Today, she has no acute complaints. She denies fevers, chills, loss of appetite, pain, vaginal bleeding, urinary incontinence, or hematuria.

## 2021-10-19 ENCOUNTER — OUTPATIENT (OUTPATIENT)
Dept: OUTPATIENT SERVICES | Facility: HOSPITAL | Age: 86
LOS: 1 days | Discharge: HOME | End: 2021-10-19
Payer: MEDICARE

## 2021-10-19 DIAGNOSIS — I47.2 VENTRICULAR TACHYCARDIA: ICD-10-CM

## 2021-10-19 DIAGNOSIS — Z98.890 OTHER SPECIFIED POSTPROCEDURAL STATES: Chronic | ICD-10-CM

## 2021-10-19 PROCEDURE — G1004: CPT

## 2021-10-19 PROCEDURE — 78452 HT MUSCLE IMAGE SPECT MULT: CPT | Mod: 26,ME

## 2021-10-21 DIAGNOSIS — C54.1 MALIGNANT NEOPLASM OF ENDOMETRIUM: ICD-10-CM

## 2021-11-02 ENCOUNTER — RX RENEWAL (OUTPATIENT)
Age: 86
End: 2021-11-02

## 2022-01-18 ENCOUNTER — RX RENEWAL (OUTPATIENT)
Age: 87
End: 2022-01-18

## 2022-01-21 ENCOUNTER — APPOINTMENT (OUTPATIENT)
Dept: GYNECOLOGIC ONCOLOGY | Facility: CLINIC | Age: 87
End: 2022-01-21
Payer: MEDICARE

## 2022-01-21 ENCOUNTER — OUTPATIENT (OUTPATIENT)
Dept: OUTPATIENT SERVICES | Facility: HOSPITAL | Age: 87
LOS: 1 days | Discharge: HOME | End: 2022-01-21

## 2022-01-21 DIAGNOSIS — Z98.890 OTHER SPECIFIED POSTPROCEDURAL STATES: Chronic | ICD-10-CM

## 2022-01-21 DIAGNOSIS — Z00.00 ENCOUNTER FOR GENERAL ADULT MEDICAL EXAMINATION W/OUT ABNORMAL FINDINGS: ICD-10-CM

## 2022-01-21 PROCEDURE — 99213 OFFICE O/P EST LOW 20 MIN: CPT

## 2022-01-24 DIAGNOSIS — C54.1 MALIGNANT NEOPLASM OF ENDOMETRIUM: ICD-10-CM

## 2022-01-24 DIAGNOSIS — Z00.00 ENCOUNTER FOR GENERAL ADULT MEDICAL EXAMINATION WITHOUT ABNORMAL FINDINGS: ICD-10-CM

## 2022-01-26 NOTE — HISTORY OF PRESENT ILLNESS
[FreeTextEntry1] : 87 yo here for surveillance\par \par Diagnosis: Stage 1A serous endometrial carcinoma on endometrial biopsy, no cancer found in hysterectomy specimen\par Surgery: 12/10/2020 - RATLH-BSO, staging\par Adjuvant treatment: None\par \par Health maintenance : needs mammogram and colonoscopy\par \par Today, she reports doing well. she has no acute complaints. Reports some mild GI discomfort and constipation. Admits she needs to schedule colonoscopy. She denies fevers, chills, loss of appetite, pain, vaginal bleeding, urinary incontinence, or hematuria.

## 2022-01-26 NOTE — DISCUSSION/SUMMARY
[Visit Time ___ Minutes] : [unfilled] minutes [Face to Face Time___ Minutes] : with [unfilled] minutes in face to face consultation. [FreeTextEntry1] : 85 yo with stage 1A serous endometrial carcinoma on biopsy, s/p RATLH/BSO, pelvic & para-aortic lymphadenectomy, omentectomy, pelvic washings, surgery pathology negative. DFI 1yr. SOPHIA on exam today.\par \par -50% of visit spent counseling patient and coordinating care. Reviewed diagnosis and natural hx of disease. reviewed si/sxs of recurrence. reviewed importance of health maintenance\par -referral for colonoscopy as pt needs f/u and with some mild GI complaints today\par -rtc 3 months for surveillance\par -pain/fever/bleeding precautions given\par

## 2022-01-26 NOTE — END OF VISIT
[Time Spent: ___ minutes] : I have spent [unfilled] minutes of time on the encounter. [FreeTextEntry3] : see above

## 2022-01-26 NOTE — PHYSICAL EXAM
[Absent] : Adnexa(ae): Absent [Normal] : Recto-Vaginal Exam: Normal [Restricted in physically strenuous activity but ambulatory and able to carry out work of a light or sedentary nature] : Status 1- Restricted in physically strenuous activity but ambulatory and able to carry out work of a light or sedentary nature, e.g., light house work, office work [de-identified] : soft [de-identified] : atrophy, intact cuff, no blood or lesions [de-identified] : intact cuff, no masses [de-identified] : no masses, septum smooth

## 2022-04-01 ENCOUNTER — RX RENEWAL (OUTPATIENT)
Age: 87
End: 2022-04-01

## 2022-04-01 RX ORDER — HYDRALAZINE HYDROCHLORIDE 50 MG/1
50 TABLET ORAL EVERY 6 HOURS
Qty: 360 | Refills: 3 | Status: ACTIVE | COMMUNITY
Start: 2020-12-01 | End: 1900-01-01

## 2022-04-22 ENCOUNTER — OUTPATIENT (OUTPATIENT)
Dept: OUTPATIENT SERVICES | Facility: HOSPITAL | Age: 87
LOS: 1 days | Discharge: HOME | End: 2022-04-22

## 2022-04-22 ENCOUNTER — APPOINTMENT (OUTPATIENT)
Dept: GYNECOLOGIC ONCOLOGY | Facility: CLINIC | Age: 87
End: 2022-04-22
Payer: MEDICARE

## 2022-04-22 VITALS
HEIGHT: 63 IN | SYSTOLIC BLOOD PRESSURE: 135 MMHG | HEART RATE: 57 BPM | TEMPERATURE: 97.8 F | DIASTOLIC BLOOD PRESSURE: 71 MMHG | BODY MASS INDEX: 20.73 KG/M2 | WEIGHT: 117 LBS

## 2022-04-22 DIAGNOSIS — Z98.890 OTHER SPECIFIED POSTPROCEDURAL STATES: Chronic | ICD-10-CM

## 2022-04-22 PROCEDURE — 99213 OFFICE O/P EST LOW 20 MIN: CPT

## 2022-04-22 NOTE — DISCUSSION/SUMMARY
[Reviewed Clinical Lab Test(s)] : Results of clinical tests were reviewed. [Reviewed Radiology Report(s)] : Radiology reports were reviewed. [Visit Time ___ Minutes] : [unfilled] minutes [Face to Face Time___ Minutes] : with [unfilled] minutes in face to face consultation. [FreeTextEntry1] : Intermediate risk uterine cancer\par Continue close follow up\par \par Mammogram done on the outside and was normal per patient\par \par Return in 3 months

## 2022-04-22 NOTE — HISTORY OF PRESENT ILLNESS
[FreeTextEntry1] : 85 yo wth stage 1A PSC of the uterus\par \par \par Triston/BSO/P&PA LND omentectomy 12/2020 no residual ca in the specimen\par Adjuvant treatment: None\par \par No complaints\par Lower abdominal pain RLQ mostly for over 1 year now\par Had work up with MRI which was negative per the patient\par \par Progressing Parkinson's disease

## 2022-04-22 NOTE — PHYSICAL EXAM
[Absent] : Adnexa(ae): Absent [Normal] : Recto-Vaginal Exam: Normal [FreeTextEntry1] : NAD [de-identified] : ASHLEY [de-identified] : no edema [de-identified] : soft NT/ND [de-identified] : cuff without lesions [Fully active, able to carry on all pre-disease performance without restriction] : Status 0 - Fully active, able to carry on all pre-disease performance without restriction

## 2022-04-22 NOTE — REVIEW OF SYSTEMS
[Negative] : Musculoskeletal [Rash] : no rash noted [Ulcer] : no ulcer was seen [Syncope] : no syncope noted [Seizures] : no seizures [Neuropathy] : no neuropathy [Depression] : no depression [Hematuria] : no hematuria [Dysuria] : no dysuria [Fatigue] : no fatigue [Recent Wt Gain ___ Lbs] : no recent weight gain [Recent Wt Loss___ Lbs] : no recent weight loss [Chest Pain] : no chest pain [ALANIS] : no dyspnea on exertion [Wheezing] : no wheezing [Muscle Weakness] : no muscle weakness [FreeTextEntry2] : + tremor

## 2022-04-25 DIAGNOSIS — C54.1 MALIGNANT NEOPLASM OF ENDOMETRIUM: ICD-10-CM

## 2022-07-05 ENCOUNTER — APPOINTMENT (OUTPATIENT)
Dept: CARDIOLOGY | Facility: CLINIC | Age: 87
End: 2022-07-05

## 2022-07-05 ENCOUNTER — OUTPATIENT (OUTPATIENT)
Dept: OUTPATIENT SERVICES | Facility: HOSPITAL | Age: 87
LOS: 1 days | Discharge: HOME | End: 2022-07-05

## 2022-07-05 ENCOUNTER — NON-APPOINTMENT (OUTPATIENT)
Age: 87
End: 2022-07-05

## 2022-07-05 VITALS
HEIGHT: 63 IN | SYSTOLIC BLOOD PRESSURE: 180 MMHG | OXYGEN SATURATION: 98 % | HEART RATE: 57 BPM | BODY MASS INDEX: 20.02 KG/M2 | DIASTOLIC BLOOD PRESSURE: 81 MMHG | WEIGHT: 113 LBS

## 2022-07-05 DIAGNOSIS — I48.92 UNSPECIFIED ATRIAL FLUTTER: ICD-10-CM

## 2022-07-05 DIAGNOSIS — Z98.890 OTHER SPECIFIED POSTPROCEDURAL STATES: Chronic | ICD-10-CM

## 2022-07-05 DIAGNOSIS — I49.3 VENTRICULAR PREMATURE DEPOLARIZATION: ICD-10-CM

## 2022-07-05 DIAGNOSIS — I10 ESSENTIAL (PRIMARY) HYPERTENSION: ICD-10-CM

## 2022-07-05 DIAGNOSIS — R01.1 CARDIAC MURMUR, UNSPECIFIED: ICD-10-CM

## 2022-07-05 PROCEDURE — 93010 ELECTROCARDIOGRAM REPORT: CPT

## 2022-07-05 PROCEDURE — 93010 ELECTROCARDIOGRAM REPORT: CPT | Mod: 77

## 2022-07-05 PROCEDURE — 99214 OFFICE O/P EST MOD 30 MIN: CPT

## 2022-07-05 RX ORDER — METRONIDAZOLE 7.5 MG/G
0.75 GEL VAGINAL
Qty: 7 | Refills: 0 | Status: DISCONTINUED | COMMUNITY
Start: 2021-08-03 | End: 2022-07-05

## 2022-07-05 RX ORDER — SULFAMETHOXAZOLE AND TRIMETHOPRIM 800; 160 MG/1; MG/1
800-160 TABLET ORAL TWICE DAILY
Qty: 6 | Refills: 0 | Status: DISCONTINUED | COMMUNITY
Start: 2020-12-07 | End: 2022-07-05

## 2022-07-05 RX ORDER — FLUCONAZOLE 150 MG/1
150 TABLET ORAL
Qty: 2 | Refills: 0 | Status: DISCONTINUED | COMMUNITY
Start: 2021-08-03 | End: 2022-07-05

## 2022-07-05 NOTE — HISTORY OF PRESENT ILLNESS
[FreeTextEntry1] : 86 year old w/ PMHx of DM, hypothyroidism, asthma, Parkinsons disease, RUL lung adenocarcinoma presenting for follow up.\par \par Was seen the last few times for HTN and palpitations. \par Holter showed new onset A flutter and fib with NSVT. He was started on metoprolol and eliquis. Aspirin was discontinued. A stress test was normal. \par His hydralazine and amlodipine was changed to nifedipine\par Echo showed mild AS other wise normal. \par \par Since last visit: \par She feels well. Her BP is high at night 180-230 in the afternnon ~ 160s. She takes her nifedipine at night if she takes it in the morning she feels very tired and weak. \par In the monring if it's high she takes hydralazine\par If BP < 110 she skips both. \par \par ==========Cardiac workup========\par \par ECHO 2021\par Normal LV function \par Severely enlarged LA \par Mild LV thickness \par Trace MR \par Moderate TR \par Mild AS \par Mild NV \par Severe PAH \par \par \par Impression: 2021\par 1. IV Lexiscan Dual Isotope Study which was negative with respect to symptoms and EKG changes.\par 2. Myocardial perfusion imaging reveals no fixed no reperfusion defects\par 3. Gated imaging reveals normal wall motion thickening and ejection fraction\par \par Holter 03/2021 showed bigeminy, 2 episodes of NSVT with max of 7 beats, SVT with max of 11 beats\par \par

## 2022-07-05 NOTE — PHYSICAL EXAM
[Well Developed] : well developed [Well Nourished] : well nourished [No Acute Distress] : no acute distress [Normal Venous Pressure] : normal venous pressure [No Carotid Bruit] : no carotid bruit [Normal S1, S2] : normal S1, S2 [No Murmur] : no murmur [Clear Lung Fields] : clear lung fields [Good Air Entry] : good air entry [Soft] : abdomen soft [Non Tender] : non-tender [Normal Bowel Sounds] : normal bowel sounds [Normal Gait] : normal gait [No Edema] : no edema [No Rash] : no rash [Moves all extremities] : moves all extremities [No Focal Deficits] : no focal deficits [Normal Speech] : normal speech [Alert and Oriented] : alert and oriented [Normal memory] : normal memory

## 2022-07-05 NOTE — DISCUSSION/SUMMARY
[FreeTextEntry1] : 86 year old w/ PMHx of DM, hypothyroidism, asthma, Parkinsons disease, RUL lung adenocarcinoma, Stage 1A serous endometrial carcinoma s/p RATLH-BSO presenting for follow up. \par \par # a flutter - Controlled back in sinus \par TSH normal \par - continue eliquis \par - Continue beta blocker \par \par # Hypertension - 180 here in office \par Repeat: 180\par CT abdomen without evidence of adrenal lesion\par - Take Nifedipine 30 mg now, the increase to 60 mg starting next dose (written instructions given)\par - Hold nifedipine if BP is less than 100 at home.\par \par # Anemia - Resolved \par - Last CBC in 2021 Hb 12\par - Denies bleeding  \par \par # NSVT\par # PVCs, Bigeminy\par - Normal stress test \par - Continue beta blocker \par \par # Severe pulmonary hypertension \par # Mild AS \par Asymptomatic \par - Monitor for now 
stated

## 2022-07-09 ENCOUNTER — RX RENEWAL (OUTPATIENT)
Age: 87
End: 2022-07-09

## 2022-08-29 ENCOUNTER — NON-APPOINTMENT (OUTPATIENT)
Age: 87
End: 2022-08-29

## 2022-08-29 ENCOUNTER — APPOINTMENT (OUTPATIENT)
Dept: SURGERY | Facility: CLINIC | Age: 87
End: 2022-08-29

## 2022-08-29 VITALS
HEIGHT: 63 IN | SYSTOLIC BLOOD PRESSURE: 104 MMHG | BODY MASS INDEX: 19.84 KG/M2 | HEART RATE: 55 BPM | DIASTOLIC BLOOD PRESSURE: 68 MMHG | OXYGEN SATURATION: 98 % | TEMPERATURE: 96.8 F | WEIGHT: 112 LBS

## 2022-08-29 PROCEDURE — 99203 OFFICE O/P NEW LOW 30 MIN: CPT

## 2022-08-30 RX ORDER — ATORVASTATIN CALCIUM 20 MG/1
20 TABLET, FILM COATED ORAL
Qty: 90 | Refills: 0 | Status: ACTIVE | COMMUNITY
Start: 2021-11-10

## 2022-08-30 NOTE — HISTORY OF PRESENT ILLNESS
[de-identified] :  the patient comes with her son to be treated for a new diagnosis of right breast cancer.  Recently she noticed some right nipple inversion without any other symptoms or changes in either breast.  She has no prior history of any other breast disorders or breast surgery, and has had no recent trauma or infection.  When seen by her dermatologist, Dr. Urbano, she was referred to her gynecologist and had bilateral mammogram and sonogram were obtained which were negative.  A subsequent breast MRI showed a right breast lesion for which a core biopsy was recommended.   this was a 6 mm lesion reported to be a well differentiated invasive ductal carcinoma.\par \par There is no family history of breast cancer.\par \par Menarche was at age 17, first pregnancy was at age 28 and menopause was at age 52.  She is a  who never nursed or used hormones.

## 2022-08-30 NOTE — ASSESSMENT
[FreeTextEntry1] :  cT1b N0 M0 right breast IDC, with favorable biomarker profile as noted above.  I believe overall her prognosis will be excellent.  The breast imaging studies and biopsy slides will be obtained for review and confirmation of the diagnoses.  She will require surgical resection and should return to her cardiologist promptly for reevaluation in that regard.  Management options were discussed, including mastectomy versus lumpectomy, axillary staging via lymph node dissection or sentinel lymph node biopsy, and postoperative management by  medical and radiation oncology.  The relative risks and benefits of these interventions were discussed, but given her age and her favorable biomarker profile, I believe she will do very well with wide local excision alone and postoperative endocrine therapy with an aromatase inhibitor.     We also discussed the option of management with endocrine therapy alone, but I do not see any absolute contraindication to lumpectomy at this time. She will have postop medical and radiation oncology consultations but again, postop radiation therapy will likely not improve her disease-free or overall survival.  They will return for reevaluation after completion of the above.  All their questions were answered.  They are happy with the assessment and plan.

## 2022-08-30 NOTE — DATA REVIEWED
[FreeTextEntry1] :   Reviewed reports of breast imaging studies and right breast core biopsy as noted above.  The images are not available at this time.\par \par  final pathology shows a well differentiated invasive ductal carcinoma, strongly ER and ME positive, HER negative, Ki-67 10%.

## 2022-08-30 NOTE — PHYSICAL EXAM
[Normal Thyroid] : the thyroid was normal [Normal Breath Sounds] : Normal breath sounds [Normal Heart Sounds] : normal heart sounds [Normal Rate and Rhythm] : normal rate and rhythm [No HSM] : no hepatosplenomegaly [de-identified] :   Thin and somewhat frail appearing [de-identified] :  no adenopathy [de-identified] :   Symmetrical and free of any nipple discharge or suspicious skin changes bilaterally.  The left breast demonstrates no palpable mass or area of suspicion and no nipple retraction.  The right nipple is inverted but soft and does honey somewhat with gentle manipulation.  There is a healing core biopsy puncture at 4:00 and a 1.5 cm density at the 2:00, likely representing the core biopsy site.  There was some diffuse ecchymosis of the lower inner quadrant of the right breast also.  No other masses noted in the right breast, no axillary adenopathy bilaterally.

## 2022-08-30 NOTE — REASON FOR VISIT
[Initial Evaluation] : an initial evaluation [Family Member] : family member [FreeTextEntry1] : Right breast cancer

## 2022-09-06 ENCOUNTER — NON-APPOINTMENT (OUTPATIENT)
Age: 87
End: 2022-09-06

## 2022-09-07 ENCOUNTER — LABORATORY RESULT (OUTPATIENT)
Age: 87
End: 2022-09-07

## 2022-09-08 ENCOUNTER — APPOINTMENT (OUTPATIENT)
Dept: CARDIOLOGY | Facility: CLINIC | Age: 87
End: 2022-09-08

## 2022-09-08 VITALS
WEIGHT: 112 LBS | TEMPERATURE: 97.1 F | DIASTOLIC BLOOD PRESSURE: 64 MMHG | OXYGEN SATURATION: 94 % | HEIGHT: 63 IN | SYSTOLIC BLOOD PRESSURE: 108 MMHG | BODY MASS INDEX: 19.84 KG/M2 | HEART RATE: 61 BPM

## 2022-09-08 DIAGNOSIS — R53.83 OTHER FATIGUE: ICD-10-CM

## 2022-09-08 PROCEDURE — 99214 OFFICE O/P EST MOD 30 MIN: CPT

## 2022-09-08 PROCEDURE — 93000 ELECTROCARDIOGRAM COMPLETE: CPT

## 2022-09-08 RX ORDER — NIFEDIPINE 30 MG/1
30 TABLET, EXTENDED RELEASE ORAL DAILY
Qty: 90 | Refills: 3 | Status: DISCONTINUED | COMMUNITY
Start: 2021-03-09 | End: 2022-09-08

## 2022-09-08 RX ORDER — ATORVASTATIN CALCIUM 40 MG/1
40 TABLET, FILM COATED ORAL
Refills: 0 | Status: DISCONTINUED | COMMUNITY
End: 2022-09-08

## 2022-09-08 RX ORDER — APIXABAN 2.5 MG/1
2.5 TABLET, FILM COATED ORAL
Qty: 180 | Refills: 2 | Status: ACTIVE | COMMUNITY
Start: 2021-05-11 | End: 1900-01-01

## 2022-09-08 RX ORDER — GLIMEPIRIDE 1 MG/1
1 TABLET ORAL
Refills: 0 | Status: DISCONTINUED | COMMUNITY
End: 2022-09-08

## 2022-09-08 NOTE — PHYSICAL EXAM
[No Acute Distress] : no acute distress [Normal Conjunctiva] : normal conjunctiva [Normal Venous Pressure] : normal venous pressure [No Carotid Bruit] : no carotid bruit [Normal S1, S2] : normal S1, S2 [No Rub] : no rub [No Gallop] : no gallop [Clear Lung Fields] : clear lung fields [Good Air Entry] : good air entry [No Respiratory Distress] : no respiratory distress  [Soft] : abdomen soft [Non Tender] : non-tender [No Masses/organomegaly] : no masses/organomegaly [Normal Bowel Sounds] : normal bowel sounds [No Edema] : no edema [No Cyanosis] : no cyanosis [No Clubbing] : no clubbing [No Varicosities] : no varicosities [No Rash] : no rash [No Skin Lesions] : no skin lesions [Moves all extremities] : moves all extremities [No Focal Deficits] : no focal deficits [Normal Speech] : normal speech [Alert and Oriented] : alert and oriented [Normal memory] : normal memory [Abnormal Gait] : abnormal gait [de-identified] : Elderly woman, frail [de-identified] : 2/4 diastolic murmur heard best at the [facility]. [de-identified] : Pill-rolling tremor

## 2022-09-08 NOTE — REVIEW OF SYSTEMS
[Negative] : Heme/Lymph [Feeling Fatigued] : feeling fatigued [Dyspnea on exertion] : dyspnea during exertion [Tremor] : a tremor was seen

## 2022-09-08 NOTE — ASSESSMENT
[FreeTextEntry1] : Ms. Nicole is an 86-year-old woman with history of severe pulmonary hypertension, atrial flutter on AC, RUL lung adenocarcinoma, IDDM2, HTN, hypothyroidism, and Parkinson's disease presenting for a follow up visit for management of pulmonary hypertension and AF.\par \par Impression:\par (1) Pulmonary hypertension via TTE. Unclear group, EF is preserved.\par (2) AFL, CHADSVASc at least 5 (Age +2, F, HTN, DM2), on AC\par (3) Hypertension, previously poorly controlled, improved\par (4) HLD, on lipitor, LDL 71, HDL 51, TG 98, , Non-HDL 82\par (5) Poorly controlled IDDM2, A1c 10.2 in 2021. On Glimepiride, Jardiance, metformin, insulin\par (6) CKD3, GFR 40s\par (7) RUL lung adenocarcinoma\par (8) Hypothyroidism on synthroid\par (9) Parkinson's disease, on carbidopa-levodopa\par \par Plan:\par - Continue AC, decrease to low dose given Age >80 and Weight <60kg.\par - Continue atorvastatin given history of DM2\par - Continue nifedipine, metoprolol, and hydralazine; not my preferred regimen but seems to be working for her.\par - DM2 management deferred to PCP; agree with use of Jardiance for cardiac benefit.\par - Obtain TTE to reassess pulmonary pressures. Based on her most recent TTE in 2021, she would be a poor candidate for intubation due to severe pulmonary hypertension. However, if surgery is to be pursued under local anesthesia, no further cardiac testing is necessary prior to proceeding.\par \par RTC after TTE.

## 2022-09-08 NOTE — CARDIOLOGY SUMMARY
[de-identified] : Holter 03/2021 showed bigeminy, 2 episodes of NSVT with max of 7 beats, SVT with max of 11 beats [de-identified] : ECHO 2021\par Normal LV function \par Severely enlarged LA \par Mild LV thickness \par Trace MR \par Moderate TR \par Mild AS \par Mild UT \par Severe PAH \par  [de-identified] : Impression: 2021\par 1. IV Lexiscan Dual Isotope Study which was negative with respect to symptoms and EKG changes.\par 2. Myocardial perfusion imaging reveals no fixed no reperfusion defects\par 3. Gated imaging reveals normal wall motion thickening and ejection fraction

## 2022-09-08 NOTE — HISTORY OF PRESENT ILLNESS
[FreeTextEntry1] : Ms. Nicole is an 86-year-old woman with history of severe pulmonary hypertension, atrial flutter on AC, RUL lung adenocarcinoma, DM2, HTN, hypothyroidism, and Parkinson's disease presenting for a follow up visit for management of pulmonary hypertension and AF.\par \par Patient previously followed with Dr. Glez and was last seen in clinic 7/5/22. At that time, she reported feeling well but with very labile blood pressures at home (sometimes as high as the 230s in the afternoon, sometimes SBP <110). \par \par Today, patient is accompanied by her son. She states she is her for pre-operative assessment prior to breast mass excision under local anesthesia. \par \par Overall, she feels fatigued with some dyspnea on exertion that has somewhat worsened since her last visit with cardiology. She denies chest pain, palpitations, pre-syncope, syncope, LE swelling, PND, or orthopnea.\par \par ECG shows AFL at 60bpm.\par

## 2022-09-19 ENCOUNTER — APPOINTMENT (OUTPATIENT)
Dept: SURGERY | Facility: CLINIC | Age: 87
End: 2022-09-19

## 2022-09-19 ENCOUNTER — APPOINTMENT (OUTPATIENT)
Dept: HEMATOLOGY ONCOLOGY | Facility: CLINIC | Age: 87
End: 2022-09-19

## 2022-09-19 ENCOUNTER — OUTPATIENT (OUTPATIENT)
Dept: OUTPATIENT SERVICES | Facility: HOSPITAL | Age: 87
LOS: 1 days | End: 2022-09-19

## 2022-09-19 VITALS
WEIGHT: 112 LBS | DIASTOLIC BLOOD PRESSURE: 60 MMHG | HEIGHT: 63 IN | BODY MASS INDEX: 19.84 KG/M2 | HEART RATE: 68 BPM | OXYGEN SATURATION: 98 % | TEMPERATURE: 97 F | SYSTOLIC BLOOD PRESSURE: 158 MMHG

## 2022-09-19 DIAGNOSIS — R92.1 MAMMOGRAPHIC CALCIFICATION FOUND ON DIAGNOSTIC IMAGING OF BREAST: ICD-10-CM

## 2022-09-19 DIAGNOSIS — Z98.890 OTHER SPECIFIED POSTPROCEDURAL STATES: Chronic | ICD-10-CM

## 2022-09-19 DIAGNOSIS — Z80.0 FAMILY HISTORY OF MALIGNANT NEOPLASM OF DIGESTIVE ORGANS: ICD-10-CM

## 2022-09-19 PROCEDURE — 99213 OFFICE O/P EST LOW 20 MIN: CPT

## 2022-09-19 PROCEDURE — 99204 OFFICE O/P NEW MOD 45 MIN: CPT

## 2022-09-19 NOTE — HISTORY OF PRESENT ILLNESS
[de-identified] :  the patient returns with her son for further discussion regarding the management of her biopsy-proven right breast cancer, T1bN0 M0 IDC, HR positive, HER negative.    She does not notice any new symptoms or changes in either breast, and was seen by her cardiologist within the last 2 weeks.

## 2022-09-19 NOTE — PHYSICAL EXAM
[de-identified] :  elderly and frail, but fully oriented. [de-identified] :   No adenopathy [de-identified] :  small and symmetrical, with no nipple discharge, nipple retraction, suspicious skin changes noted bilaterally.  There is a vague palpable nontender mass in the upper inner quadrant of the right breast measuring 1.5 cm,  representing the index lesion.  Just below this is some resolving skin ecchymosis.  No new masses or suspicious areas noted in either breast.  No axillary adenopathy bilaterally.

## 2022-09-19 NOTE — ASSESSMENT
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  I believe this can be well managed with a wide local excision alone, but this cannot be done under local anesthesia alone and will require at least intravenous sedation.  They understand that these cases will sometimes require conversion to general anesthesia, and although this is an unusual occurrence, it would not be in her best interest in light of her pulmonary hypertension, as per the cardiologist.  Another option would be endocrine therapy alone with tamoxifen or an aromatase inhibitor, to which this lesion would likely respond very well.  It may not be a permanent solution to her problem, but she recognizes her advanced  age and her possible limited longevity.  In order to explore this option further, she will see one of our medical oncologists and return to her cardiologist next week as scheduled.  They will return here thereafter for further discussion and final management decisions.  All of their questions were answered and they are happy with the assessment and plan.

## 2022-09-21 ENCOUNTER — NON-APPOINTMENT (OUTPATIENT)
Age: 87
End: 2022-09-21

## 2022-09-22 ENCOUNTER — NON-APPOINTMENT (OUTPATIENT)
Age: 87
End: 2022-09-22

## 2022-09-25 PROBLEM — R92.1 BREAST CALCIFICATION, RIGHT: Status: ACTIVE | Noted: 2022-09-25

## 2022-09-25 NOTE — CONSULT LETTER
[Dear  ___] : Dear  [unfilled], [Consult Letter:] : I had the pleasure of evaluating your patient, [unfilled]. [Please see my note below.] : Please see my note below. [Consult Closing:] : Thank you very much for allowing me to participate in the care of this patient.  If you have any questions, please do not hesitate to contact me. [Sincerely,] : Sincerely, [FreeTextEntry3] : Brianne Gaytan MD

## 2022-09-25 NOTE — REVIEW OF SYSTEMS
[Fatigue] : fatigue [SOB on Exertion] : shortness of breath during exertion [Joint Pain] : joint pain [Joint Stiffness] : joint stiffness [Negative] : Heme/Lymph

## 2022-09-25 NOTE — PHYSICAL EXAM
[Ambulatory and capable of all self care but unable to carry out any work activities] : Status 2- Ambulatory and capable of all self care but unable to carry out any work activities. Up and about more than 50% of waking hours [Normal] : grossly intact [de-identified] : Small right breast mass is not palpable. No skin change or nipple discharge. No palpable right axillary adenopathy.

## 2022-09-25 NOTE — ASSESSMENT
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 Padmaja has a small IDC of the right breast, G1, hormone receptor positive. She could have surgical excision followed by adjuvant endocrine therapy. However, given her elderly age and multiple comorbidities, she is reluctant for surgery. We discussed primary endocrine therapy with an aromatase inhibitor such as Letrozole. We reviewed benefit and side effects. She was made aware that the repose to endocrine therapy is slow and may take a few months. But treatment is in general well tolerated. The potential side effects may include but not limit to muscular and skeletal aching, arthralgia, loss of bone density, osteopenia and osteoporosis, a small increase risk of cardiovascular events and slightly increase of hyperlipidemia. We discussed bone density study and bone health management. She was also made aware that surgery is the only curative treatment. If she forgo surgery, she will need to stay on the pill. She was given an option to start Letrozole now while she is making her decision and getting medical clearance if she decides to proceed to surgery. However, she wants to think about and will let me know her decision. \par \par

## 2022-09-25 NOTE — HISTORY OF PRESENT ILLNESS
[de-identified] : 87 yo female is referred by Dr. Rodriguez for consultation of breast cancer. She initially noticed some right nipple inversion and underwent diagnostic work up. Bilateral dx mammogram and sonogram on 2/18/22 were negative. On 6/29/22, b/l breast MRI showed 0.6 cm mass in the right breast at 11:00 location. MRI guided biopsy was recommended. There was no suspicious finding in the left breast and no abnormal axillary adenopathy. \par \par On 9/7/22, she had MRI guided needle core biopsies of right breast 11:00 enhancing mass which revealed Invasive well differentiated ductal carcinoma with focal tubular\par features, ER/VT positive 100%, Her-2 negative (0%), Ki 67 10%. \par \par She saw Dr. Rodriguez for surgical consultation. She has multiple comorbidities with pulmonary hypertension, DM, HTN, A-flutter, Parkinsons, h/o endometrial cancer and lung cancer (s/p lobectomy 4 years ago). She was given options to have upfront surgery or primary endocrine therapy \par \par She is here today with her son for consultation of endocrine therapy.

## 2022-09-26 ENCOUNTER — APPOINTMENT (OUTPATIENT)
Dept: CARDIOLOGY | Facility: CLINIC | Age: 87
End: 2022-09-26

## 2022-09-26 PROCEDURE — 93306 TTE W/DOPPLER COMPLETE: CPT

## 2022-09-27 DIAGNOSIS — Z17.0 ESTROGEN RECEPTOR POSITIVE STATUS [ER+]: ICD-10-CM

## 2022-09-27 DIAGNOSIS — C50.411 MALIGNANT NEOPLASM OF UPPER-OUTER QUADRANT OF RIGHT FEMALE BREAST: ICD-10-CM

## 2022-09-28 ENCOUNTER — OUTPATIENT (OUTPATIENT)
Dept: OUTPATIENT SERVICES | Facility: HOSPITAL | Age: 87
LOS: 1 days | Discharge: HOME | End: 2022-09-28

## 2022-09-28 DIAGNOSIS — Z98.890 OTHER SPECIFIED POSTPROCEDURAL STATES: Chronic | ICD-10-CM

## 2022-09-28 DIAGNOSIS — Z02.9 ENCOUNTER FOR ADMINISTRATIVE EXAMINATIONS, UNSPECIFIED: ICD-10-CM

## 2022-10-04 ENCOUNTER — OUTPATIENT (OUTPATIENT)
Dept: OUTPATIENT SERVICES | Facility: HOSPITAL | Age: 87
LOS: 1 days | Discharge: HOME | End: 2022-10-04

## 2022-10-04 ENCOUNTER — APPOINTMENT (OUTPATIENT)
Dept: GYNECOLOGIC ONCOLOGY | Facility: CLINIC | Age: 87
End: 2022-10-04

## 2022-10-04 DIAGNOSIS — Z98.890 OTHER SPECIFIED POSTPROCEDURAL STATES: Chronic | ICD-10-CM

## 2022-10-04 PROCEDURE — 99214 OFFICE O/P EST MOD 30 MIN: CPT

## 2022-10-04 NOTE — PHYSICAL EXAM
[Absent] : Adnexa(ae): Absent [Normal] : Recto-Vaginal Exam: Normal [FreeTextEntry1] : NAD [de-identified] : ASHLEY [de-identified] : no edema [de-identified] : soft NT/ND [de-identified] : cuff without lesions [Fully active, able to carry on all pre-disease performance without restriction] : Status 0 - Fully active, able to carry on all pre-disease performance without restriction

## 2022-10-04 NOTE — DISCUSSION/SUMMARY
[Visit Time ___ Minutes] : [unfilled] minutes [Face to Face Time___ Minutes] : with [unfilled] minutes in face to face consultation. [FreeTextEntry1] : PSC without evidence of disease \par \par New breast cancer with favorable prognosis. She is leaning towards local excision, pending med clearance.\par \par Follow up every 6 months now.

## 2022-10-04 NOTE — HISTORY OF PRESENT ILLNESS
[FreeTextEntry1] : 85 yo wth stage 1A PSC of the uterus\par \par \par Triston/BSO/P&PA LND omentectomy 12/2020 no residual ca in the specimen\par Adjuvant treatment: None\par \par No complaints\par Lower abdominal pain RLQ mostly for over 1 year now\par Had work up with MRI which was negative per the patient\par \par Progressing Parkinson's disease \par Was Dx with R sided breast cancer (less than 1 cm, favorable receptors) and is considering treatment options

## 2022-10-05 DIAGNOSIS — C50.411 MALIGNANT NEOPLASM OF UPPER-OUTER QUADRANT OF RIGHT FEMALE BREAST: ICD-10-CM

## 2022-10-05 DIAGNOSIS — C54.1 MALIGNANT NEOPLASM OF ENDOMETRIUM: ICD-10-CM

## 2022-10-10 ENCOUNTER — APPOINTMENT (OUTPATIENT)
Dept: CARDIOLOGY | Facility: CLINIC | Age: 87
End: 2022-10-10

## 2022-10-10 VITALS
BODY MASS INDEX: 20.38 KG/M2 | HEART RATE: 92 BPM | OXYGEN SATURATION: 95 % | HEIGHT: 63 IN | SYSTOLIC BLOOD PRESSURE: 148 MMHG | WEIGHT: 115 LBS | DIASTOLIC BLOOD PRESSURE: 70 MMHG

## 2022-10-10 PROCEDURE — 99213 OFFICE O/P EST LOW 20 MIN: CPT

## 2022-10-10 PROCEDURE — 93000 ELECTROCARDIOGRAM COMPLETE: CPT

## 2022-10-10 NOTE — PHYSICAL EXAM
[No Acute Distress] : no acute distress [Normal Conjunctiva] : normal conjunctiva [Normal Venous Pressure] : normal venous pressure [No Carotid Bruit] : no carotid bruit [Normal S1, S2] : normal S1, S2 [No Rub] : no rub [No Gallop] : no gallop [Clear Lung Fields] : clear lung fields [Good Air Entry] : good air entry [No Respiratory Distress] : no respiratory distress  [Soft] : abdomen soft [Non Tender] : non-tender [No Masses/organomegaly] : no masses/organomegaly [Normal Bowel Sounds] : normal bowel sounds [Abnormal Gait] : abnormal gait [No Edema] : no edema [No Cyanosis] : no cyanosis [No Clubbing] : no clubbing [No Varicosities] : no varicosities [No Rash] : no rash [No Skin Lesions] : no skin lesions [Moves all extremities] : moves all extremities [No Focal Deficits] : no focal deficits [Normal Speech] : normal speech [Alert and Oriented] : alert and oriented [Normal memory] : normal memory [de-identified] : Elderly woman, frail [de-identified] : Pill-rolling tremor [de-identified] : 2/4 diastolic murmur heard best at the [facility]B.

## 2022-10-10 NOTE — HISTORY OF PRESENT ILLNESS
[FreeTextEntry1] : Ms. Nicole is an 86-year-old woman with history of severe pulmonary hypertension, atrial flutter on AC, RUL lung adenocarcinoma, DM2, HTN, hypothyroidism, and Parkinson's disease presenting for a follow up visit for management of pulmonary hypertension and AF.\par \par Patient previously followed with Dr. Glez and was last seen in clinic 7/5/22. At that time, she reported feeling well but with very labile blood pressures at home (sometimes as high as the 230s in the afternoon, sometimes SBP <110). \par \par First visit 9/8:\par "...Today, patient is accompanied by her son. She states she is her for pre-operative assessment prior to breast mass excision under local anesthesia.  Overall, she feels fatigued with some dyspnea on exertion that has somewhat worsened since her last visit with cardiology. She denies chest pain, palpitations, pre-syncope, syncope, LE swelling, PND, or orthopnea."\par \par ECG shows AFL at 60bpm.\par \par TTE 9/2022\par - Normal biventricular function (hyperdynamic LV), moderate TR with moderate pHTN (PASP = 51 mmHg), mild AS, moderate MAC\par \par She is being evaluated for breast cancer with consideration for lumpectomy vs medical therapy. She prefers surgery at this point. Denies active cardiopulmonary complaints at rest; does have some dyspnea with exertion that is unchanged over the past few years (unchanged since NM stress 10/2021).

## 2022-10-10 NOTE — ASSESSMENT
[FreeTextEntry1] : Ms. Nicole is an 86-year-old woman with history of severe pulmonary hypertension, atrial flutter on AC, RUL lung adenocarcinoma, IDDM2, HTN, hypothyroidism, and Parkinson's disease presenting for a follow up visit for management of pulmonary hypertension and AF.\par \par Impression:\par (1) Moderate pHTN based on TTE from 9/2022. Unclear group, EF is preserved, NM stress neg in 2021.\par (2) AFL, CHADSVASc at least 5 (Age +2, F, HTN, DM2), on AC\par (3) Hypertension, previously poorly controlled, improved\par (4) HLD, on lipitor, LDL 71, HDL 51, TG 98, , Non-HDL 82\par (5) Poorly controlled IDDM2, A1c 10.2 in 2021. On Glimepiride, Jardiance, metformin, insulin\par (6) CKD3, GFR 40s\par (7) RUL lung adenocarcinoma\par (8) Hypothyroidism on synthroid\par (9) Parkinson's disease, on carbidopa-levodopa\par \par Plan:\par - Overall, given recent TTE demonstrating moderate pHTN, she is a moderate risk for sedation/surgery. No further cardiac workup is recommended prior to intervention. Final decision regarding risk/benefit of surgery should be assessed with the primary surgeon, but at this point the risk is less than anticipated from prior evaluation since she does not have severe pHTN. Would still ideally prefer no sedation/intubation but if necessary she may proceed.\par - Continue AC, low dose given Age >80 and Weight <60kg.\par - Continue atorvastatin given history of DM2\par - Continue nifedipine, metoprolol, and hydralazine; not my preferred regimen but seems to be working for her.\par - DM2 management deferred to PCP; agree with use of Jardiance for cardiac benefit.\par \par RTC 3-6 months.

## 2022-10-10 NOTE — CARDIOLOGY SUMMARY
[de-identified] : Holter 03/2021 showed bigeminy, 2 episodes of NSVT with max of 7 beats, SVT with max of 11 beats [de-identified] : ECHO 2021\par Normal LV function \par Severely enlarged LA \par Mild LV thickness \par Trace MR \par Moderate TR \par Mild AS \par Mild NM \par Severe PAH \par  [de-identified] : Impression: 2021\par 1. IV Lexiscan Dual Isotope Study which was negative with respect to symptoms and EKG changes.\par 2. Myocardial perfusion imaging reveals no fixed no reperfusion defects\par 3. Gated imaging reveals normal wall motion thickening and ejection fraction

## 2022-10-13 ENCOUNTER — APPOINTMENT (OUTPATIENT)
Dept: SURGERY | Facility: CLINIC | Age: 87
End: 2022-10-13

## 2022-10-13 VITALS
HEART RATE: 58 BPM | DIASTOLIC BLOOD PRESSURE: 60 MMHG | TEMPERATURE: 96.8 F | SYSTOLIC BLOOD PRESSURE: 110 MMHG | HEIGHT: 63 IN | BODY MASS INDEX: 20.91 KG/M2 | WEIGHT: 118 LBS | OXYGEN SATURATION: 97 %

## 2022-10-13 PROCEDURE — 99212 OFFICE O/P EST SF 10 MIN: CPT

## 2022-10-17 RX ORDER — NIFEDIPINE 60 MG/1
60 TABLET, FILM COATED, EXTENDED RELEASE ORAL DAILY
Qty: 30 | Refills: 3 | Status: ACTIVE | COMMUNITY
Start: 2022-07-05 | End: 1900-01-01

## 2022-10-28 ENCOUNTER — APPOINTMENT (OUTPATIENT)
Dept: GASTROENTEROLOGY | Facility: CLINIC | Age: 87
End: 2022-10-28

## 2022-10-28 ENCOUNTER — NON-APPOINTMENT (OUTPATIENT)
Age: 87
End: 2022-10-28

## 2022-11-14 NOTE — HISTORY OF PRESENT ILLNESS
[de-identified] :  the patient returns for reevaluation regarding the management of her right breast cancer.  She notes no new symptoms or changes in either breast.  She has been seen by her cardiologist and wishes to proceed with the proposed surgery.\par \par Right breast T1BN0 IDC, HR positive/H ER negative.

## 2022-11-14 NOTE — DATA REVIEWED
[FreeTextEntry1] :   Recent cardiology and medical oncology evaluations reviewed.  She can proceed with the surgery from a cardiology standpoint and is now on a lower dose of Eliquis.

## 2022-11-14 NOTE — PHYSICAL EXAM
[de-identified] :   Elderly, somewhat frail [de-identified] :  no new masses, suspicious areas, skin change, nipple discharge noted bilaterally.  Palpable mass in the upper inner quadrant of the right breast is now no more than 1 cm in size, mobile and nontender.  No axillary adenopathy bilaterally.

## 2022-11-14 NOTE — ASSESSMENT
[FreeTextEntry1] : c T1bN0   IDC of the right breast as noted above.  The patient prefers to proceed with excision of the lesion, with which I am in agreement.  Given her age and overall medical status, I believe it is not inappropriate to eliminate surgical staging of the axilla as she can  be treated adequately postoperatively with endocrine therapy.  We will plan for an excisional biopsy with a preop radiofrequency localizer, as the lesion may not be palpable by the time of surgery.  The procedure was discussed in full with its risks and benefits and all her questions were answered.  She will need to stop the Eliquis 48 hours preoperatively and this will be discussed with the cardiologist also.  She will follow with the medical oncologist postoperatively.  A surgical consent was obtained at this visit and she was encouraged to have her son call me with any questions that may remain.

## 2022-11-25 ENCOUNTER — OUTPATIENT (OUTPATIENT)
Dept: OUTPATIENT SERVICES | Facility: HOSPITAL | Age: 87
LOS: 1 days | Discharge: HOME | End: 2022-11-25

## 2022-11-25 VITALS
TEMPERATURE: 98 F | DIASTOLIC BLOOD PRESSURE: 86 MMHG | HEART RATE: 78 BPM | SYSTOLIC BLOOD PRESSURE: 186 MMHG | HEIGHT: 64 IN | WEIGHT: 112.44 LBS | OXYGEN SATURATION: 98 %

## 2022-11-25 DIAGNOSIS — Z90.710 ACQUIRED ABSENCE OF BOTH CERVIX AND UTERUS: Chronic | ICD-10-CM

## 2022-11-25 DIAGNOSIS — C50.211 MALIGNANT NEOPLASM OF UPPER-INNER QUADRANT OF RIGHT FEMALE BREAST: ICD-10-CM

## 2022-11-25 DIAGNOSIS — Z98.890 OTHER SPECIFIED POSTPROCEDURAL STATES: Chronic | ICD-10-CM

## 2022-11-25 DIAGNOSIS — Z01.818 ENCOUNTER FOR OTHER PREPROCEDURAL EXAMINATION: ICD-10-CM

## 2022-11-25 LAB
A1C WITH ESTIMATED AVERAGE GLUCOSE RESULT: 7.8 % — HIGH (ref 4–5.6)
ALBUMIN SERPL ELPH-MCNC: 4.4 G/DL — SIGNIFICANT CHANGE UP (ref 3.5–5.2)
ALP SERPL-CCNC: 82 U/L — SIGNIFICANT CHANGE UP (ref 30–115)
ALT FLD-CCNC: 19 U/L — SIGNIFICANT CHANGE UP (ref 0–41)
ANION GAP SERPL CALC-SCNC: 15 MMOL/L — HIGH (ref 7–14)
APTT BLD: 32.2 SEC — SIGNIFICANT CHANGE UP (ref 27–39.2)
AST SERPL-CCNC: 17 U/L — SIGNIFICANT CHANGE UP (ref 0–41)
BASOPHILS # BLD AUTO: 0.1 K/UL — SIGNIFICANT CHANGE UP (ref 0–0.2)
BASOPHILS NFR BLD AUTO: 1.1 % — HIGH (ref 0–1)
BILIRUB SERPL-MCNC: 0.3 MG/DL — SIGNIFICANT CHANGE UP (ref 0.2–1.2)
BUN SERPL-MCNC: 41 MG/DL — HIGH (ref 10–20)
CALCIUM SERPL-MCNC: 10.3 MG/DL — SIGNIFICANT CHANGE UP (ref 8.4–10.5)
CHLORIDE SERPL-SCNC: 104 MMOL/L — SIGNIFICANT CHANGE UP (ref 98–110)
CO2 SERPL-SCNC: 21 MMOL/L — SIGNIFICANT CHANGE UP (ref 17–32)
CREAT SERPL-MCNC: 1.3 MG/DL — SIGNIFICANT CHANGE UP (ref 0.7–1.5)
EGFR: 41 ML/MIN/1.73M2 — LOW
EOSINOPHIL # BLD AUTO: 0.19 K/UL — SIGNIFICANT CHANGE UP (ref 0–0.7)
EOSINOPHIL NFR BLD AUTO: 2.1 % — SIGNIFICANT CHANGE UP (ref 0–8)
ESTIMATED AVERAGE GLUCOSE: 177 MG/DL — HIGH (ref 68–114)
GLUCOSE SERPL-MCNC: 68 MG/DL — LOW (ref 70–99)
HCT VFR BLD CALC: 36.9 % — LOW (ref 37–47)
HGB BLD-MCNC: 11.6 G/DL — LOW (ref 12–16)
IMM GRANULOCYTES NFR BLD AUTO: 0.3 % — SIGNIFICANT CHANGE UP (ref 0.1–0.3)
INR BLD: 1.1 RATIO — SIGNIFICANT CHANGE UP (ref 0.65–1.3)
LYMPHOCYTES # BLD AUTO: 1.53 K/UL — SIGNIFICANT CHANGE UP (ref 1.2–3.4)
LYMPHOCYTES # BLD AUTO: 16.6 % — LOW (ref 20.5–51.1)
MCHC RBC-ENTMCNC: 27.6 PG — SIGNIFICANT CHANGE UP (ref 27–31)
MCHC RBC-ENTMCNC: 31.4 G/DL — LOW (ref 32–37)
MCV RBC AUTO: 87.9 FL — SIGNIFICANT CHANGE UP (ref 81–99)
MONOCYTES # BLD AUTO: 0.99 K/UL — HIGH (ref 0.1–0.6)
MONOCYTES NFR BLD AUTO: 10.8 % — HIGH (ref 1.7–9.3)
NEUTROPHILS # BLD AUTO: 6.36 K/UL — SIGNIFICANT CHANGE UP (ref 1.4–6.5)
NEUTROPHILS NFR BLD AUTO: 69.1 % — SIGNIFICANT CHANGE UP (ref 42.2–75.2)
NRBC # BLD: 0 /100 WBCS — SIGNIFICANT CHANGE UP (ref 0–0)
PLATELET # BLD AUTO: 299 K/UL — SIGNIFICANT CHANGE UP (ref 130–400)
POTASSIUM SERPL-MCNC: 4.9 MMOL/L — SIGNIFICANT CHANGE UP (ref 3.5–5)
POTASSIUM SERPL-SCNC: 4.9 MMOL/L — SIGNIFICANT CHANGE UP (ref 3.5–5)
PROT SERPL-MCNC: 7.5 G/DL — SIGNIFICANT CHANGE UP (ref 6–8)
PROTHROM AB SERPL-ACNC: 12.6 SEC — SIGNIFICANT CHANGE UP (ref 9.95–12.87)
RBC # BLD: 4.2 M/UL — SIGNIFICANT CHANGE UP (ref 4.2–5.4)
RBC # FLD: 14.6 % — HIGH (ref 11.5–14.5)
SODIUM SERPL-SCNC: 140 MMOL/L — SIGNIFICANT CHANGE UP (ref 135–146)
WBC # BLD: 9.2 K/UL — SIGNIFICANT CHANGE UP (ref 4.8–10.8)
WBC # FLD AUTO: 9.2 K/UL — SIGNIFICANT CHANGE UP (ref 4.8–10.8)

## 2022-11-25 PROCEDURE — 93010 ELECTROCARDIOGRAM REPORT: CPT

## 2022-11-25 RX ORDER — HYDRALAZINE HCL 50 MG
1 TABLET ORAL
Qty: 0 | Refills: 0 | DISCHARGE

## 2022-11-25 RX ORDER — GLIMEPIRIDE 1 MG
1 TABLET ORAL
Qty: 0 | Refills: 0 | DISCHARGE

## 2022-11-25 RX ORDER — ASPIRIN/CALCIUM CARB/MAGNESIUM 324 MG
1 TABLET ORAL
Qty: 0 | Refills: 0 | DISCHARGE

## 2022-11-25 RX ORDER — MAGNESIUM CHLORIDE
1 CRYSTALS MISCELLANEOUS
Qty: 0 | Refills: 0 | DISCHARGE

## 2022-11-25 RX ORDER — SITAGLIPTIN 50 MG/1
1 TABLET, FILM COATED ORAL
Qty: 0 | Refills: 0 | DISCHARGE

## 2022-11-25 RX ORDER — ATORVASTATIN CALCIUM 80 MG/1
1 TABLET, FILM COATED ORAL
Qty: 0 | Refills: 0 | DISCHARGE

## 2022-11-25 RX ORDER — UBIDECARENONE 100 MG
1 CAPSULE ORAL
Qty: 0 | Refills: 0 | DISCHARGE

## 2022-11-25 NOTE — H&P PST ADULT - HISTORY OF PRESENT ILLNESS
HARD OF HEARING   HEARING AID NOT CHARGED AT PAST VISIT    "He's operating on my right breast"  PATIENT CURRENTLY DENIES CHEST PAIN  SHORTNESS OF BREATH  PALPITATIONS,  DYSURIA, OR URI WITHIN THE IN PAST 2 WEEKS  EXERCISE  TOLERANCE  1-2 FLIGHT OF STAIRS  WITHOUT SHORTNESS OF BREATH  -Denies travel outside the [place] in the past 30 days  -Patient denies any signs or symptoms of COVID 19 and denies contact with known positive individuals.    -Patient has appointment for COVID testing pre-procedure and acknowledges its time and place.    -Patient was instructed to quarantine pre-procedure, practice exposure control measures, continue to self-monitor and report any concerns to their proceduralist.  -Pt advised self quarantine till day of procedure    ANESTHESIA ALERT  NO--Difficult Airway  NO--History of neck surgery or radiation  yes--Limited ROM of neck  NO--History of Malignant hyperthermia  NO--No personal or family history of Pseudocholinesterase deficiency.  NO--Prior Anesthesia Complication  NO--Latex Allergy  NO--Loose teeth  NO--History of Rheumatoid Arthritis  NO--Bleeding risk  NO--CAITLYN  NO--Other_____    -PT DENIES ANY RASHES, ABRASION, OR OPEN WOUNDS   -AS PER THE PT, THIS IS HIS/HER COMPLETE MEDICAL AND SURGICAL HX, INCLUDING MEDICATIONS PRESCRIBED AND OVER THE COUNTER    Patient verbalized understanding of instructions and was given the opportunity to ask questions and have them answered.    Pt denies any suicidal ideation or thoughts.  Pt states not a threat to self or others  DENIES DOMESTIC VIOLENCE

## 2022-11-25 NOTE — H&P PST ADULT - NSICDXPASTSURGICALHX_GEN_ALL_CORE_FT
PAST SURGICAL HISTORY:  History of hernia surgery left inquinal    History of lung surgery lung nodule resection    S/P LARA-BSO     S/P LARA-BSO (total abdominal hysterectomy and bilateral salpingo-oophorectomy)

## 2022-11-25 NOTE — H&P PST ADULT - REASON FOR ADMISSION
Suite: [facility]  Proceduralist: Murray Rodriguez  Confirmed Surgery DateTime: 12- - 0:00  PAST DateTime: 11- - 15:45  Procedure: EXCISIONAL BIOPSY OF RIGHT BREAST MASS CORE BIOPSY W/ PRE-OP RADIOFREQUENCY LOCALIZER  Laterality: Right  Length of Procedure: 60 Minutes  Anesthesia Type: Local Standby

## 2022-11-25 NOTE — H&P PST ADULT - NSICDXPASTMEDICALHX_GEN_ALL_CORE_FT
PAST MEDICAL HISTORY:  Atrial flutter PER CARDIAC NOTE    Back pain pain management injections dr rodriguez    CKD (chronic kidney disease) stage 3A   PT DENIES    DM (diabetes mellitus)     History of pulmonary hypertension     HLD (hyperlipidemia) PT DENIES    HTN (hypertension)     Hypothyroidism last blood test ~ 3months ago Spet 2022   no changes in meds    Nodule of right lung s/p resection    Occasional tremors X 10 + YEARS    Severe pulmonary hypertension

## 2022-11-25 NOTE — H&P PST ADULT - MUSCULOSKELETAL
SLOW STEADY GAIT/normal/ROM intact/strength 5/5 bilateral upper extremities/strength 5/5 bilateral lower extremities

## 2022-11-26 ENCOUNTER — RESULT REVIEW (OUTPATIENT)
Age: 87
End: 2022-11-26

## 2022-11-26 PROCEDURE — 71046 X-RAY EXAM CHEST 2 VIEWS: CPT | Mod: 26

## 2022-12-01 ENCOUNTER — APPOINTMENT (OUTPATIENT)
Dept: SURGERY | Facility: CLINIC | Age: 87
End: 2022-12-01

## 2022-12-01 VITALS
SYSTOLIC BLOOD PRESSURE: 126 MMHG | WEIGHT: 114 LBS | BODY MASS INDEX: 20.2 KG/M2 | HEIGHT: 63 IN | TEMPERATURE: 97 F | HEART RATE: 82 BPM | DIASTOLIC BLOOD PRESSURE: 84 MMHG | OXYGEN SATURATION: 98 %

## 2022-12-01 PROBLEM — Z86.79 PERSONAL HISTORY OF OTHER DISEASES OF THE CIRCULATORY SYSTEM: Chronic | Status: ACTIVE | Noted: 2022-11-25

## 2022-12-01 PROBLEM — R25.1 TREMOR, UNSPECIFIED: Chronic | Status: ACTIVE | Noted: 2020-11-19

## 2022-12-01 PROBLEM — I48.92 UNSPECIFIED ATRIAL FLUTTER: Chronic | Status: ACTIVE | Noted: 2022-11-25

## 2022-12-01 PROBLEM — E78.5 HYPERLIPIDEMIA, UNSPECIFIED: Chronic | Status: ACTIVE | Noted: 2020-11-19

## 2022-12-01 PROBLEM — E03.9 HYPOTHYROIDISM, UNSPECIFIED: Chronic | Status: ACTIVE | Noted: 2018-07-21

## 2022-12-01 PROBLEM — I27.20 PULMONARY HYPERTENSION, UNSPECIFIED: Chronic | Status: ACTIVE | Noted: 2022-11-25

## 2022-12-01 PROBLEM — N18.9 CHRONIC KIDNEY DISEASE, UNSPECIFIED: Chronic | Status: ACTIVE | Noted: 2020-11-19

## 2022-12-01 PROBLEM — M54.9 DORSALGIA, UNSPECIFIED: Chronic | Status: ACTIVE | Noted: 2022-11-25

## 2022-12-01 PROCEDURE — 99213 OFFICE O/P EST LOW 20 MIN: CPT

## 2022-12-01 NOTE — PHYSICAL EXAM
[de-identified] :   Thin and somewhat frail [de-identified] :  no adenopathy [de-identified] :  no new masses or suspicious areas noted in either breast.  The 1.5 cm lesion in the right breast at 2:00 N5 is less distinct, not tender, and freely mobile.  There are no overlying skin changes.  No axillary adenopathy bilaterally.

## 2022-12-01 NOTE — ASSESSMENT
[FreeTextEntry1] :   Clinical T1c and 0 invasive carcinoma of the right breast upper inner quadrant, now less clearly palpable after  resolution of previously noted postbiopsy changes.  The lesion is strongly ER/OH positive, H ER 2 negative, with a NONA 67 of 10%.  We are planning for a wide local excision only, following radiofrequency localizer seed placement.  Surgical staging of the axilla is not planned, as results would not change her management in light of her overall medical condition,  and she will probably be managed in this regard very well postoperatively with endocrine therapy.  The details of the procedure were discussed in full and all their questions were answered.  A surgical consent was obtained previously and is still valid.  She will stop her oral anticoagulation at least 48 hours before surgery.

## 2022-12-01 NOTE — HISTORY OF PRESENT ILLNESS
[de-identified] : The patient returns with her son for reevaluation regarding the management of her biopsy-proven right breast carcinoma.  She notes no new symptoms or changes in either breast.  When seen for preadmission testing.  Note was made of her significant pulmonary hypertension and surgery has been since rescheduled to be done in the main OR as opposed to the [facility] as she may require intubation and admission if she has an exacerbation of her cardiopulmonary status.

## 2022-12-04 ENCOUNTER — LABORATORY RESULT (OUTPATIENT)
Age: 87
End: 2022-12-04

## 2022-12-06 ENCOUNTER — RESULT REVIEW (OUTPATIENT)
Age: 87
End: 2022-12-06

## 2022-12-06 ENCOUNTER — OUTPATIENT (OUTPATIENT)
Dept: OUTPATIENT SERVICES | Facility: HOSPITAL | Age: 87
LOS: 1 days | Discharge: HOME | End: 2022-12-06

## 2022-12-06 DIAGNOSIS — Z98.890 OTHER SPECIFIED POSTPROCEDURAL STATES: Chronic | ICD-10-CM

## 2022-12-06 DIAGNOSIS — Z90.710 ACQUIRED ABSENCE OF BOTH CERVIX AND UTERUS: Chronic | ICD-10-CM

## 2022-12-06 PROCEDURE — 76641 ULTRASOUND BREAST COMPLETE: CPT | Mod: 26,RT

## 2022-12-07 ENCOUNTER — APPOINTMENT (OUTPATIENT)
Dept: SURGERY | Facility: HOSPITAL | Age: 87
End: 2022-12-07

## 2022-12-08 ENCOUNTER — TRANSCRIPTION ENCOUNTER (OUTPATIENT)
Age: 87
End: 2022-12-08

## 2022-12-15 ENCOUNTER — APPOINTMENT (OUTPATIENT)
Dept: SURGERY | Facility: CLINIC | Age: 87
End: 2022-12-15

## 2022-12-16 DIAGNOSIS — R92.8 OTHER ABNORMAL AND INCONCLUSIVE FINDINGS ON DIAGNOSTIC IMAGING OF BREAST: ICD-10-CM

## 2022-12-20 ENCOUNTER — NON-APPOINTMENT (OUTPATIENT)
Age: 87
End: 2022-12-20

## 2023-01-03 ENCOUNTER — APPOINTMENT (OUTPATIENT)
Dept: CARDIOLOGY | Facility: CLINIC | Age: 88
End: 2023-01-03

## 2023-01-06 ENCOUNTER — OUTPATIENT (OUTPATIENT)
Dept: OUTPATIENT SERVICES | Facility: HOSPITAL | Age: 88
LOS: 1 days | Discharge: HOME | End: 2023-01-06

## 2023-01-06 VITALS
TEMPERATURE: 98 F | SYSTOLIC BLOOD PRESSURE: 159 MMHG | HEIGHT: 64 IN | OXYGEN SATURATION: 98 % | WEIGHT: 113.98 LBS | DIASTOLIC BLOOD PRESSURE: 71 MMHG | HEART RATE: 62 BPM | RESPIRATION RATE: 16 BRPM

## 2023-01-06 DIAGNOSIS — Z90.710 ACQUIRED ABSENCE OF BOTH CERVIX AND UTERUS: Chronic | ICD-10-CM

## 2023-01-06 DIAGNOSIS — Z98.890 OTHER SPECIFIED POSTPROCEDURAL STATES: Chronic | ICD-10-CM

## 2023-01-06 DIAGNOSIS — Z01.818 ENCOUNTER FOR OTHER PREPROCEDURAL EXAMINATION: ICD-10-CM

## 2023-01-06 DIAGNOSIS — C50.211 MALIGNANT NEOPLASM OF UPPER-INNER QUADRANT OF RIGHT FEMALE BREAST: ICD-10-CM

## 2023-01-06 DIAGNOSIS — Z98.49 CATARACT EXTRACTION STATUS, UNSPECIFIED EYE: Chronic | ICD-10-CM

## 2023-01-06 RX ORDER — GLIMEPIRIDE 1 MG
2 TABLET ORAL
Qty: 0 | Refills: 0 | DISCHARGE

## 2023-01-06 NOTE — H&P PST ADULT - HISTORY OF PRESENT ILLNESS
88 yo female presents for PAST in preparation for EXCISIONAL BIOPSY OF RIGHT BREAST MASS CORE BIOPSY W/ PRE-OP RADIOFREQUENCY LOCALIZER  ERP?: NoLaterality: RightLength of Procedure: 60 Minute under LSB   Pt complains of  Denies any chest pain, difficulty breathing, SOB, palpitations, dysuria, URI, or any other infections in the last 2 weeks/1 month. Denies any recent travel, contact, or exposure to any persons with known or suspected COVID-19. Pt also denies COVID testing within the last 2 weeks. Pt advised to self quarantine until day of procedure. Exercise tolerance of one block stairs without dyspnea. CAITLYN reviewed with patient.    Anesthesia Alert  NO--Difficult Airway  NO--History of neck surgery or radiation  NO--Limited ROM of neck  NO--History of Malignant hyperthermia  NO--Personal or family history of Pseudocholinesterase deficiency.  NO--Prior Anesthesia Complication  NO--Latex Allergy  NO--Loose teeth  NO--History of Rheumatoid Arthritis  NO--CAITLYN  NO--Bleeding risk, ELiquis   NO--Other_   written and verbal instructions with teach back on chlorhexidine shampoo provided,  pt verbalized understanding with returned demonstration  Patient verbalized understanding of instructions and was given the opportunity to ask questions and have them answered.   88 yo female presents for PAST in preparation for EXCISIONAL BIOPSY OF RIGHT BREAST MASS CORE BIOPSY W/ PRE-OP RADIOFREQUENCY LOCALIZER  ERP?: NoLaterality: RightLength of Procedure: 60 Minute under LSB   Pt reports that her last mammogram was abnormalbut due to "dense breasts".   Recommendation: Diagnostic breast MRI may be obtained and   Continue surgical/oncological consultation for right breast malignancy.  Pt is accompanied by her son, pt is positive for resting tremors.       Denies any chest pain, difficulty breathing, SOB, palpitations, dysuria, URI, or any other infections in the last 2 weeks/1 month. Denies any recent travel, contact, or exposure to any persons with known or suspected COVID-19. Pt also denies COVID testing within the last 2 weeks. Pt advised to self quarantine until day of procedure. Exercise tolerance of one block stairs without dyspnea. CAITLYN reviewed with patient.    Anesthesia Alert  NO--Difficult Airway  NO--History of neck surgery or radiation  NO--Limited ROM of neck  NO--History of Malignant hyperthermia  NO--Personal or family history of Pseudocholinesterase deficiency.  NO--Prior Anesthesia Complication  NO--Latex Allergy  NO--Loose teeth  NO--History of Rheumatoid Arthritis  NO--CAITLYN  NO--Bleeding risk, ELiquis   NO--Other_   written and verbal instructions with teach back on chlorhexidine shampoo provided,  pt verbalized understanding with returned demonstration  Patient verbalized understanding of instructions and was given the opportunity to ask questions and have them answered.

## 2023-01-06 NOTE — H&P PST ADULT - NSICDXPASTMEDICALHX_GEN_ALL_CORE_FT
PAST MEDICAL HISTORY:  Atrial flutter PER CARDIAC NOTE    Back pain pain management injections dr rodriguez    CKD (chronic kidney disease) stage 3A   PT DENIES    DM (diabetes mellitus)     History of pulmonary hypertension     HLD (hyperlipidemia) PT DENIES    HTN (hypertension)     Hypothyroidism last blood test ~ 3months ago Spet 2022   no changes in meds    Nodule of right lung s/p resection    Occasional tremors X 10 + YEARS    Severe pulmonary hypertension      PAST MEDICAL HISTORY:  Atrial flutter PER CARDIAC NOTE    Back pain pain management injections dr rodriguez    CKD (chronic kidney disease) stage 3A   PT DENIES    DM (diabetes mellitus)     History of pulmonary hypertension     HLD (hyperlipidemia) PT DENIES    [place] (hard of hearing)     HTN (hypertension)     Hypothyroidism last blood test ~ 3months ago Spet 2022   no changes in meds    Nodule of right lung s/p resection    Occasional tremors X 10 + YEARS    Severe pulmonary hypertension

## 2023-01-06 NOTE — H&P PST ADULT - BP NONINVASIVE SYSTOLIC (MM HG)
159 Alert-The patient is alert, awake and responds to voice. The patient is oriented to time, place, and person. The triage nurse is able to obtain subjective information.

## 2023-01-06 NOTE — H&P PST ADULT - REASON FOR ADMISSION
Case Type: OP Block TimeSuite: CASProceduralist: Murray Rodriguez  Confirmed Surgery DateTime: 01-  Procedure: EXCISIONAL BIOPSY OF RIGHT BREAST MASS CORE BIOPSY W/ PRE-OP RADIOFREQUENCY LOCALIZER  ERP?: NoLaterality: RightLength of Procedure: 60 Minutes  Anesthesia Type: Local Standby

## 2023-01-06 NOTE — H&P PST ADULT - NSICDXPASTSURGICALHX_GEN_ALL_CORE_FT
PAST SURGICAL HISTORY:  History of hernia surgery left inquinal    History of lung surgery lung nodule resection    S/P LARA-BSO     S/P LARA-BSO (total abdominal hysterectomy and bilateral salpingo-oophorectomy)      PAST SURGICAL HISTORY:  History of hernia surgery left inquinal    History of lung surgery lung nodule resection    S/P cataract surgery     S/P LARA-BSO     S/P LARA-BSO (total abdominal hysterectomy and bilateral salpingo-oophorectomy)

## 2023-01-09 PROBLEM — H91.90 UNSPECIFIED HEARING LOSS, UNSPECIFIED EAR: Chronic | Status: ACTIVE | Noted: 2023-01-06

## 2023-01-17 ENCOUNTER — APPOINTMENT (OUTPATIENT)
Dept: CARDIOLOGY | Facility: CLINIC | Age: 88
End: 2023-01-17
Payer: MEDICARE

## 2023-01-17 VITALS
TEMPERATURE: 98 F | BODY MASS INDEX: 20.2 KG/M2 | DIASTOLIC BLOOD PRESSURE: 80 MMHG | WEIGHT: 114 LBS | HEIGHT: 63 IN | HEART RATE: 60 BPM | OXYGEN SATURATION: 97 % | SYSTOLIC BLOOD PRESSURE: 130 MMHG

## 2023-01-17 DIAGNOSIS — I27.20 PULMONARY HYPERTENSION, UNSPECIFIED: ICD-10-CM

## 2023-01-17 DIAGNOSIS — I49.3 VENTRICULAR PREMATURE DEPOLARIZATION: ICD-10-CM

## 2023-01-17 PROCEDURE — 93000 ELECTROCARDIOGRAM COMPLETE: CPT

## 2023-01-17 PROCEDURE — 99214 OFFICE O/P EST MOD 30 MIN: CPT

## 2023-01-17 RX ORDER — PREDNISOLONE ACETATE 10 MG/ML
1 SUSPENSION/ DROPS OPHTHALMIC
Qty: 5 | Refills: 0 | Status: DISCONTINUED | COMMUNITY
Start: 2022-11-07 | End: 2023-01-17

## 2023-01-17 RX ORDER — GLIMEPIRIDE 2 MG/1
2 TABLET ORAL
Qty: 90 | Refills: 0 | Status: DISCONTINUED | COMMUNITY
Start: 2022-06-02 | End: 2023-01-17

## 2023-01-17 RX ORDER — KETOROLAC TROMETHAMINE 5 MG/ML
0.5 SOLUTION OPHTHALMIC
Qty: 5 | Refills: 0 | Status: DISCONTINUED | COMMUNITY
Start: 2022-11-07 | End: 2023-01-17

## 2023-01-17 RX ORDER — OFLOXACIN 3 MG/ML
0.3 SOLUTION/ DROPS OPHTHALMIC
Qty: 5 | Refills: 0 | Status: DISCONTINUED | COMMUNITY
Start: 2022-11-07 | End: 2023-01-17

## 2023-01-17 RX ORDER — LACTULOSE 10 G/15ML
10 SOLUTION ORAL
Qty: 30 | Refills: 0 | Status: DISCONTINUED | COMMUNITY
Start: 2022-03-17 | End: 2023-01-17

## 2023-01-17 RX ORDER — METFORMIN HYDROCHLORIDE 1000 MG/1
1000 TABLET, COATED ORAL
Refills: 0 | Status: DISCONTINUED | COMMUNITY
End: 2023-01-17

## 2023-01-17 RX ORDER — PEN NEEDLE, DIABETIC 32GX 5/32"
32G X 4 MM NEEDLE, DISPOSABLE MISCELLANEOUS
Qty: 100 | Refills: 0 | Status: DISCONTINUED | COMMUNITY
Start: 2022-02-17 | End: 2023-01-17

## 2023-01-17 RX ORDER — INSULIN GLARGINE 100 [IU]/ML
100 INJECTION, SOLUTION SUBCUTANEOUS
Qty: 15 | Refills: 0 | Status: DISCONTINUED | COMMUNITY
Start: 2022-06-02 | End: 2023-01-17

## 2023-01-17 NOTE — ASSESSMENT
[FreeTextEntry1] : Ms. Nicole is an 86-year-old woman with history of severe pulmonary hypertension, atrial flutter on AC, recently diagnosed breast CA, RUL lung adenocarcinoma s/p wedge resection, DM2, HTN, hypothyroidism, and Parkinson's disease presenting for a follow up visit for management of pulmonary hypertension and AF.\par \par Impression:\par (1) Moderate pHTN based on TTE from 9/2022. Unclear group, EF is preserved, NM stress neg in 2021.\par (2) AFL, CHADSVASc at least 5 (Age +2, F, HTN, DM2), on AC\par (3) Hypertension, previously poorly controlled, improved\par (4) HLD, on atorvastatin, LDL 71, HDL 51, TG 98, , Non-HDL 82\par (5) Poorly controlled IDDM2, A1c 10.2 in 2021. On Glimepiride, Jardiance, metformin, insulin\par (6) CKD3, GFR 40s\par (7) RUL lung adenocarcinoma\par (8) Hypothyroidism on synthroid\par (9) Parkinson's disease, on carbidopa-levodopa\par (10) Breast CA, diagnosed 1/2023, planned for excisional surgery\par \par Plan:\par - Overall, given recent TTE demonstrating moderate pHTN, she is a moderate risk for sedation/surgery. No further cardiac workup is recommended prior to intervention. Final decision regarding risk/benefit of surgery should be assessed with the primary surgeon, but at this point the risk is less than anticipated from prior evaluation since she does not have severe pHTN. Would still ideally prefer no sedation/intubation but if necessary she may proceed.\par - Continue AC, low dose given Age >80 and Weight <60kg. Hold for 48 hours prior to any surgery and resume as soon as deemed safe by the surgical team.\par - Continue atorvastatin given history of DM2\par - Continue nifedipine, metoprolol, and hydralazine; not my preferred regimen but seems to be working for her.\par - DM2 management deferred to PCP; agree with use of Jardiance for cardiac benefit.\par \par RTC 3-6 months. Repeat TTE at that time.

## 2023-01-17 NOTE — CARDIOLOGY SUMMARY
[de-identified] : Holter 03/2021 showed bigeminy, 2 episodes of NSVT with max of 7 beats, SVT with max of 11 beats [de-identified] : ECHO 2021\par Normal LV function \par Severely enlarged LA \par Mild LV thickness \par Trace MR \par Moderate TR \par Mild AS \par Mild [place] \par Severe PAH \par  [de-identified] : Impression: 2021\par 1. IV Lexiscan Dual Isotope Study which was negative with respect to symptoms and EKG changes.\par 2. Myocardial perfusion imaging reveals no fixed no reperfusion defects\par 3. Gated imaging reveals normal wall motion thickening and ejection fraction

## 2023-01-17 NOTE — HISTORY OF PRESENT ILLNESS
[FreeTextEntry1] : Ms. Nicole is an 86-year-old woman with history of severe pulmonary hypertension, atrial flutter on AC, recently diagnosed breast CA, RUL lung adenocarcinoma s/p wedge resection, DM2, HTN, hypothyroidism, and Parkinson's disease presenting for a follow up visit for management of pulmonary hypertension and AF.\par \par Patient previously followed with Dr. Glez and was last seen in clinic 7/5/22. At that time, she reported feeling well but with very labile blood pressures at home (sometimes as high as the 230s in the afternoon, sometimes SBP <110). \par \par First visit 9/8:\par "...Today, patient is accompanied by her son. She states she is her for pre-operative assessment prior to breast mass excision under local anesthesia.  Overall, she feels fatigued with some dyspnea on exertion that has somewhat worsened since her last visit with cardiology. She denies chest pain, palpitations, pre-syncope, syncope, LE swelling, PND, or orthopnea."\par \par She was recently diagnosed with ER/AL positive breast CA via biopsy and is planed for excisional surgery in the near future. She denies active cardiopulmonary complaints at rest; does have some dyspnea with exertion that is unchanged over the past few years (unchanged since NM stress 10/2021).\par \par ECG shows rate controlled AFL.\par \par TTE 9/2022\par - Normal biventricular function (hyperdynamic LV), moderate TR with moderate pHTN (PASP = 51 mmHg), mild AS, moderate MAC\par \par

## 2023-01-17 NOTE — PHYSICAL EXAM
[No Acute Distress] : no acute distress [Normal Conjunctiva] : normal conjunctiva [Normal Venous Pressure] : normal venous pressure [No Carotid Bruit] : no carotid bruit [Normal S1, S2] : normal S1, S2 [No Rub] : no rub [No Gallop] : no gallop [Clear Lung Fields] : clear lung fields [Good Air Entry] : good air entry [No Respiratory Distress] : no respiratory distress  [Soft] : abdomen soft [Non Tender] : non-tender [No Masses/organomegaly] : no masses/organomegaly [Normal Bowel Sounds] : normal bowel sounds [Abnormal Gait] : abnormal gait [No Edema] : no edema [No Cyanosis] : no cyanosis [No Clubbing] : no clubbing [No Varicosities] : no varicosities [No Rash] : no rash [No Skin Lesions] : no skin lesions [Moves all extremities] : moves all extremities [No Focal Deficits] : no focal deficits [Normal Speech] : normal speech [Alert and Oriented] : alert and oriented [Normal memory] : normal memory [de-identified] : Elderly woman, frail [de-identified] : 2/4 diastolic murmur heard best at the [facility]LSB. [de-identified] : Pill-rolling tremor

## 2023-01-24 ENCOUNTER — LABORATORY RESULT (OUTPATIENT)
Age: 88
End: 2023-01-24

## 2023-01-25 ENCOUNTER — RESULT REVIEW (OUTPATIENT)
Age: 88
End: 2023-01-25

## 2023-01-25 ENCOUNTER — OUTPATIENT (OUTPATIENT)
Dept: OUTPATIENT SERVICES | Facility: HOSPITAL | Age: 88
LOS: 1 days | Discharge: HOME | End: 2023-01-25
Payer: MEDICARE

## 2023-01-25 DIAGNOSIS — Z98.890 OTHER SPECIFIED POSTPROCEDURAL STATES: Chronic | ICD-10-CM

## 2023-01-25 DIAGNOSIS — Z90.710 ACQUIRED ABSENCE OF BOTH CERVIX AND UTERUS: Chronic | ICD-10-CM

## 2023-01-25 DIAGNOSIS — C50.411 MALIGNANT NEOPLASM OF UPPER-OUTER QUADRANT OF RIGHT FEMALE BREAST: ICD-10-CM

## 2023-01-25 DIAGNOSIS — Z98.49 CATARACT EXTRACTION STATUS, UNSPECIFIED EYE: Chronic | ICD-10-CM

## 2023-01-25 PROCEDURE — 77048 MRI BREAST C-+ W/CAD UNI: CPT | Mod: 26,RT

## 2023-01-25 PROCEDURE — 19281 PERQ DEVICE BREAST 1ST IMAG: CPT | Mod: RT

## 2023-01-26 NOTE — ASU PATIENT PROFILE, ADULT - FALL HARM RISK - HARM RISK INTERVENTIONS
Assistance with ambulation/Assistance OOB with selected safe patient handling equipment/Communicate Risk of Fall with Harm to all staff/Discuss with provider need for PT consult/Monitor gait and stability/Reinforce activity limits and safety measures with patient and family/Tailored Fall Risk Interventions/Visual Cue: Yellow wristband and red socks/Bed in lowest position, wheels locked, appropriate side rails in place/Call bell, personal items and telephone in reach/Instruct patient to call for assistance before getting out of bed or chair/Non-slip footwear when patient is out of bed/[place] to call system/Physically safe environment - no spills, clutter or unnecessary equipment/Purposeful Proactive Rounding/Room/bathroom lighting operational, light cord in reach

## 2023-01-26 NOTE — ASU PATIENT PROFILE, ADULT - NSICDXPASTMEDICALHX_GEN_ALL_CORE_FT
PAST MEDICAL HISTORY:  Atrial flutter PER CARDIAC NOTE    Back pain pain management injections dr rodriguez    CKD (chronic kidney disease) stage 3A   PT DENIES    DM (diabetes mellitus)     History of pulmonary hypertension     HLD (hyperlipidemia) PT DENIES    [place] (hard of hearing)     HTN (hypertension)     Hypothyroidism last blood test ~ 3months ago Spet 2022   no changes in meds    Nodule of right lung s/p resection    Occasional tremors X 10 + YEARS    Severe pulmonary hypertension

## 2023-01-26 NOTE — ASU PATIENT PROFILE, ADULT - NSICDXPASTSURGICALHX_GEN_ALL_CORE_FT
PAST SURGICAL HISTORY:  History of hernia surgery left inquinal    History of lung surgery lung nodule resection    S/P cataract surgery     S/P LARA-BSO     S/P LARA-BSO (total abdominal hysterectomy and bilateral salpingo-oophorectomy)

## 2023-01-27 ENCOUNTER — OUTPATIENT (OUTPATIENT)
Dept: OUTPATIENT SERVICES | Facility: HOSPITAL | Age: 88
LOS: 1 days | Discharge: HOME | End: 2023-01-27
Payer: MEDICARE

## 2023-01-27 ENCOUNTER — RESULT REVIEW (OUTPATIENT)
Age: 88
End: 2023-01-27

## 2023-01-27 ENCOUNTER — TRANSCRIPTION ENCOUNTER (OUTPATIENT)
Age: 88
End: 2023-01-27

## 2023-01-27 ENCOUNTER — APPOINTMENT (OUTPATIENT)
Dept: SURGERY | Facility: AMBULATORY SURGERY CENTER | Age: 88
End: 2023-01-27

## 2023-01-27 VITALS
RESPIRATION RATE: 18 BRPM | TEMPERATURE: 99 F | WEIGHT: 113.98 LBS | OXYGEN SATURATION: 98 % | HEART RATE: 67 BPM | HEIGHT: 64 IN

## 2023-01-27 VITALS
HEART RATE: 56 BPM | DIASTOLIC BLOOD PRESSURE: 54 MMHG | OXYGEN SATURATION: 97 % | SYSTOLIC BLOOD PRESSURE: 96 MMHG | RESPIRATION RATE: 18 BRPM

## 2023-01-27 DIAGNOSIS — Z90.710 ACQUIRED ABSENCE OF BOTH CERVIX AND UTERUS: Chronic | ICD-10-CM

## 2023-01-27 DIAGNOSIS — Z98.49 CATARACT EXTRACTION STATUS, UNSPECIFIED EYE: Chronic | ICD-10-CM

## 2023-01-27 DIAGNOSIS — Z98.890 OTHER SPECIFIED POSTPROCEDURAL STATES: Chronic | ICD-10-CM

## 2023-01-27 DIAGNOSIS — Z02.9 ENCOUNTER FOR ADMINISTRATIVE EXAMINATIONS, UNSPECIFIED: ICD-10-CM

## 2023-01-27 PROCEDURE — 88305 TISSUE EXAM BY PATHOLOGIST: CPT | Mod: 26

## 2023-01-27 PROCEDURE — 12032 INTMD RPR S/A/T/EXT 2.6-7.5: CPT | Mod: 59

## 2023-01-27 PROCEDURE — 19301 PARTIAL MASTECTOMY: CPT

## 2023-01-27 RX ORDER — SODIUM CHLORIDE 9 MG/ML
1000 INJECTION, SOLUTION INTRAVENOUS
Refills: 0 | Status: DISCONTINUED | OUTPATIENT
Start: 2023-01-27 | End: 2023-02-10

## 2023-01-27 RX ORDER — ACETAMINOPHEN WITH CODEINE 300MG-30MG
1 TABLET ORAL
Qty: 15 | Refills: 0
Start: 2023-01-27

## 2023-01-27 RX ORDER — ONDANSETRON 8 MG/1
4 TABLET, FILM COATED ORAL ONCE
Refills: 0 | Status: DISCONTINUED | OUTPATIENT
Start: 2023-01-27 | End: 2023-02-10

## 2023-01-27 RX ORDER — HYDROMORPHONE HYDROCHLORIDE 2 MG/ML
0.25 INJECTION INTRAMUSCULAR; INTRAVENOUS; SUBCUTANEOUS
Refills: 0 | Status: DISCONTINUED | OUTPATIENT
Start: 2023-01-27 | End: 2023-01-27

## 2023-01-27 RX ADMIN — SODIUM CHLORIDE 100 MILLILITER(S): 9 INJECTION, SOLUTION INTRAVENOUS at 12:40

## 2023-01-27 NOTE — BRIEF OPERATIVE NOTE - NSICDXBRIEFPOSTOP_GEN_ALL_CORE_FT
POST-OP DIAGNOSIS:  Invasive ductal carcinoma of breast, stage 1 27-Jan-2023 12:27:08  Juan Pablo Castillo

## 2023-01-27 NOTE — ASU DISCHARGE PLAN (ADULT/PEDIATRIC) - CARE PROVIDER_API CALL
Murray Rodriguez)  Surgery  [address] [place] [address], [address] [place] [address]  Phone: [phone]  Fax: [phone]  Established Patient  Follow Up Time: 2 weeks

## 2023-01-27 NOTE — BRIEF OPERATIVE NOTE - OPERATION/FINDINGS
Excisional Biopsy of Right Breast Mass Core Biopsy w/ Preoperative Radiofrequency Localization (RF ID: [id number])    Skin and subcutaneous tissues anesthetized w/ lidocaine.  4cm skin incision.  Excisional biopsy obtained using radiofrequency probe and electrocautery.  Additional superior, deep, and lateral margins obtained.  Radiofrequency seed identified on x-ray of biopsy specimen. RF ID: [id number] confirmed.  Hemostasis achieved.  Skin, subcutaneous tissues, and biopsy cavity anesthetized w/ bupivacaine.  Biopsy cavity approximated w/ 3-0 Vicryl simple, buried sutures.  Subcutaneous tissues closed w/ 4-0 Vicryl simple, buried sutures.  Skin closed w/ 4-0 Monocryl running subcuticular suture.  Sterile dressings and surgical dressings applied. Excisional Biopsy of Right Breast Mass Core Biopsy w/ Preoperative Radiofrequency Localization (RF ID: [id number])    Skin and subcutaneous tissues anesthetized w/ lidocaine.  4cm skin incision.  Excisional biopsy obtained using radiofrequency probe and electrocautery.  Additional superior, deep, and lateral margins obtained.  Radiofrequency seed identified on x-ray of biopsy specimen. RF ID: [id number] confirmed.  Hemostasis achieved.  Skin, subcutaneous tissues, and biopsy cavity anesthetized w/ bupivacaine.  Biopsy cavity approximated w/ 3-0 Vicryl simple, buried sutures.  Subcutaneous tissues closed w/ 4-0 Vicryl simple, buried sutures.  Skin closed w/ 4-0 Vicryl running subcuticular suture.  Sterile dressings and surgical dressings applied.

## 2023-01-27 NOTE — BRIEF OPERATIVE NOTE - NSICDXBRIEFPROCEDURE_GEN_ALL_CORE_FT
PROCEDURES:  Lumpectomy of breast with localization using radiofrequency identification 27-Jan-2023 12:26:26  Juan Pablo Castillo

## 2023-01-27 NOTE — BRIEF OPERATIVE NOTE - NSICDXBRIEFPREOP_GEN_ALL_CORE_FT
PRE-OP DIAGNOSIS:  Invasive ductal carcinoma of breast, stage 1 27-Jan-2023 12:26:40  Juan Pablo Castillo

## 2023-01-27 NOTE — ASU DISCHARGE PLAN (ADULT/PEDIATRIC) - ASU DC SPECIAL INSTRUCTIONSFT
Follow Up with Dr. Rodriguez in 10-14 days. Please call office for confirmation of your appointment.    Blood thinner: Please restart your blood thinner Eliquis tomorrow.    Activity: Please avoid lifting anything heavier than 10 pounds with your right arm for the next 2-3 weeks.    Surgical Bra: Please wear your surgical bra 24/7. After 48 hours, you can remove it while showering and then resume wearing it afterwards.    Surgical Dressings: You may remove your dressings after 48 hours. Please do not remove the white steri-strips. These will fall off on their own.    Showering: You may resume showering in 48 hours. Please allow the water to run over the incision. Please avoid submerging the incision. Please avoid vigorously rubbing/toweling the incision. Gently pat it dry with a clean, dry cloth.    Diet: regular    Pain: You can take over the counter medications such as Tylenol, and Ibuprofen for pain control. Please adhere to the instructions on the back of the bottle. If it was discussed that you would be receiving prescription pain medication upon discharge, this prescription will be sent to your pharmacy. Please avoid driving or taking tylenol if you are taking the tylenol with codeine.    If you develop fevers, chills, worsening pain, increased drainage from the wound, foul smelling drainage from the wound, nausea that won't subside, vomiting, or any other symptoms of concern, please call MD for further advice, evaluation, and/or treatment.    Activity: Ambulate and get out of bed as tolerated, and with assistance if feeling weak.

## 2023-01-27 NOTE — ASU DISCHARGE PLAN (ADULT/PEDIATRIC) - NS MD DC FALL RISK RISK
For information on Fall & Injury Prevention, visit: [email or web address] OR  [email or web address] OR  https://www.cdc.gov/steadi/patient.html

## 2023-01-31 LAB — SURGICAL PATHOLOGY STUDY: SIGNIFICANT CHANGE UP

## 2023-02-01 DIAGNOSIS — N60.31 FIBROSCLEROSIS OF RIGHT BREAST: ICD-10-CM

## 2023-02-01 DIAGNOSIS — N64.2 ATROPHY OF BREAST: ICD-10-CM

## 2023-02-01 DIAGNOSIS — N63.10 UNSPECIFIED LUMP IN THE RIGHT BREAST, UNSPECIFIED QUADRANT: ICD-10-CM

## 2023-02-01 DIAGNOSIS — E78.5 HYPERLIPIDEMIA, UNSPECIFIED: ICD-10-CM

## 2023-02-01 DIAGNOSIS — N18.9 CHRONIC KIDNEY DISEASE, UNSPECIFIED: ICD-10-CM

## 2023-02-01 DIAGNOSIS — Z79.4 LONG TERM (CURRENT) USE OF INSULIN: ICD-10-CM

## 2023-02-01 DIAGNOSIS — Z90.722 ACQUIRED ABSENCE OF OVARIES, BILATERAL: ICD-10-CM

## 2023-02-01 DIAGNOSIS — C50.911 MALIGNANT NEOPLASM OF UNSPECIFIED SITE OF RIGHT FEMALE BREAST: ICD-10-CM

## 2023-02-01 DIAGNOSIS — Z90.710 ACQUIRED ABSENCE OF BOTH CERVIX AND UTERUS: ICD-10-CM

## 2023-02-01 DIAGNOSIS — I48.92 UNSPECIFIED ATRIAL FLUTTER: ICD-10-CM

## 2023-02-01 DIAGNOSIS — Z79.01 LONG TERM (CURRENT) USE OF ANTICOAGULANTS: ICD-10-CM

## 2023-02-01 DIAGNOSIS — Z17.0 ESTROGEN RECEPTOR POSITIVE STATUS [ER+]: ICD-10-CM

## 2023-02-01 DIAGNOSIS — I12.9 HYPERTENSIVE CHRONIC KIDNEY DISEASE WITH STAGE 1 THROUGH STAGE 4 CHRONIC KIDNEY DISEASE, OR UNSPECIFIED CHRONIC KIDNEY DISEASE: ICD-10-CM

## 2023-02-01 DIAGNOSIS — E11.22 TYPE 2 DIABETES MELLITUS WITH DIABETIC CHRONIC KIDNEY DISEASE: ICD-10-CM

## 2023-02-01 DIAGNOSIS — H91.90 UNSPECIFIED HEARING LOSS, UNSPECIFIED EAR: ICD-10-CM

## 2023-02-01 DIAGNOSIS — I27.20 PULMONARY HYPERTENSION, UNSPECIFIED: ICD-10-CM

## 2023-02-01 DIAGNOSIS — E03.9 HYPOTHYROIDISM, UNSPECIFIED: ICD-10-CM

## 2023-02-01 DIAGNOSIS — N60.21 FIBROADENOSIS OF RIGHT BREAST: ICD-10-CM

## 2023-02-09 ENCOUNTER — APPOINTMENT (OUTPATIENT)
Dept: SURGERY | Facility: CLINIC | Age: 88
End: 2023-02-09
Payer: MEDICARE

## 2023-02-09 VITALS
DIASTOLIC BLOOD PRESSURE: 60 MMHG | HEART RATE: 54 BPM | SYSTOLIC BLOOD PRESSURE: 122 MMHG | HEIGHT: 63 IN | TEMPERATURE: 97.1 F | OXYGEN SATURATION: 98 %

## 2023-02-09 PROCEDURE — 99024 POSTOP FOLLOW-UP VISIT: CPT

## 2023-02-09 NOTE — HISTORY OF PRESENT ILLNESS
[de-identified] : First postoperative visit after recent right breast WLE.  She comes with her son and is also seen with the breast service nurse navigator.  She has no specific complaints regarding the surgery.

## 2023-02-09 NOTE — DATA REVIEWED
[FreeTextEntry1] :   Final pathology shows a 3.5 mm IDC, previous breast prognostic profile was strongly ER and LA positive at 90 to 100%, H ER negative, Ki-67 10%

## 2023-02-09 NOTE — PHYSICAL EXAM
[de-identified] :  no distress, somewhat frail [de-identified] :  right breast incision clean and dry, no evidence of hematoma or infection.

## 2023-02-09 NOTE — ASSESSMENT
[FreeTextEntry1] :   No postoperative problems noted.  Local care and activity instructions were reviewed and all their questions were answered.  She will return in about 6 to 8 weeks for reevaluation.\par \par The pathology was reviewed in detail, and especially in light of the favorable prognostic profile and the minute size of the lesion, I believe her prognosis is excellent.  She will return to the medical oncologist to be evaluated for endocrine therapy.  Although it is likely not going to be necessary, she was recommended to have a radiation oncology evaluation also.  This was all discussed in detail and they understand and agree.

## 2023-03-27 ENCOUNTER — APPOINTMENT (OUTPATIENT)
Dept: HEMATOLOGY ONCOLOGY | Facility: CLINIC | Age: 88
End: 2023-03-27
Payer: MEDICARE

## 2023-03-27 ENCOUNTER — APPOINTMENT (OUTPATIENT)
Dept: HEMATOLOGY ONCOLOGY | Facility: CLINIC | Age: 88
End: 2023-03-27

## 2023-03-27 ENCOUNTER — OUTPATIENT (OUTPATIENT)
Dept: OUTPATIENT SERVICES | Facility: HOSPITAL | Age: 88
LOS: 1 days | End: 2023-03-27
Payer: MEDICARE

## 2023-03-27 VITALS
OXYGEN SATURATION: 96 % | BODY MASS INDEX: 21.44 KG/M2 | HEIGHT: 63 IN | WEIGHT: 121 LBS | TEMPERATURE: 96.7 F | DIASTOLIC BLOOD PRESSURE: 91 MMHG | SYSTOLIC BLOOD PRESSURE: 162 MMHG | HEART RATE: 51 BPM

## 2023-03-27 DIAGNOSIS — Z98.890 OTHER SPECIFIED POSTPROCEDURAL STATES: Chronic | ICD-10-CM

## 2023-03-27 DIAGNOSIS — Z90.710 ACQUIRED ABSENCE OF BOTH CERVIX AND UTERUS: Chronic | ICD-10-CM

## 2023-03-27 DIAGNOSIS — C34.91 MALIGNANT NEOPLASM OF UNSPECIFIED PART OF RIGHT BRONCHUS OR LUNG: ICD-10-CM

## 2023-03-27 DIAGNOSIS — Z98.49 CATARACT EXTRACTION STATUS, UNSPECIFIED EYE: Chronic | ICD-10-CM

## 2023-03-27 PROCEDURE — 99213 OFFICE O/P EST LOW 20 MIN: CPT

## 2023-03-28 DIAGNOSIS — C34.91 MALIGNANT NEOPLASM OF UNSPECIFIED PART OF RIGHT BRONCHUS OR LUNG: ICD-10-CM

## 2023-04-02 NOTE — CONSULT LETTER
[Dear  ___] : Dear  [unfilled], [Please see my note below.] : Please see my note below. [Consult Closing:] : Thank you very much for allowing me to participate in the care of this patient.  If you have any questions, please do not hesitate to contact me. [Sincerely,] : Sincerely, [Courtesy Letter:] : I had the pleasure of seeing your patient, [unfilled], in my office today. [FreeTextEntry3] : Brianne Gaytan MD

## 2023-04-02 NOTE — ASSESSMENT
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 \par \par Assessment and Plan: \par \par A detailed discussion was held with the patient regarding to her diagnosis and adjuvant treatment options. Padmaja has a small IDC of the right breast, G1, hormone receptor positive. She would benefit from adjuvant endocrine therapy following surgical excision. \par \par However, given her elderly age and multiple comorbidities, she is reluctant to proceed with adjuvant endocrine therapy. \par \par We discussed primary endocrine therapy with an aromatase inhibitor such as Letrozole. We reviewed benefit and side effects. She was made aware that the repose to endocrine therapy is slow and may take a few months. But treatment is in general well tolerated. The potential side effects may include but not limit to muscular and skeletal aching, arthralgia, loss of bone density, osteopenia and osteoporosis, a small increase risk of cardiovascular events and slightly increase of hyperlipidemia. We discussed bone density study and bone health management. \par \par She wants to think about it and will let us know her decision regarding adjuvant endocrine therapy. \par \par She will also follow up with Dr. Rodriguez.\par \par RTC in 3 months.

## 2023-04-02 NOTE — PHYSICAL EXAM
[Ambulatory and capable of all self care but unable to carry out any work activities] : Status 2- Ambulatory and capable of all self care but unable to carry out any work activities. Up and about more than 50% of waking hours [Normal] : grossly intact [de-identified] : s/p R breast WLE. Surgical scar healed well.

## 2023-04-02 NOTE — HISTORY OF PRESENT ILLNESS
[de-identified] : 85 yo female is referred by Dr. Rodriguez for consultation of breast cancer. She initially noticed some right nipple inversion and underwent diagnostic work up. Bilateral dx mammogram and sonogram on 2/18/22 were negative. On 6/29/22, b/l breast MRI showed 0.6 cm mass in the right breast at 11:00 location. MRI guided biopsy was recommended. There was no suspicious finding in the left breast and no abnormal axillary adenopathy. \par \par On 9/7/22, she had MRI guided needle core biopsies of right breast 11:00 enhancing mass which revealed Invasive well differentiated ductal carcinoma with focal tubular\par features, ER/OK positive 100%, Her-2 negative (0%), Ki 67 10%. \par \par She saw Dr. Rodriguez for surgical consultation. She has multiple comorbidities with pulmonary hypertension, DM, HTN, A-flutter, Parkinsons, h/o endometrial cancer and lung cancer (s/p lobectomy 4 years ago). She was given options to have upfront surgery or primary endocrine therapy \par \par She is here today with her son for consultation of endocrine therapy.  [de-identified] : 3/27/23\par Ms. Nicole is here for followup for stage IA ([id number]) IDC of R breast, s/p lumpectomy on 1/27/23. The final pathology showed invasive well differentiated ductal carcinoma, 3.5 mm, with dominant tubular features and located 4.0 mm from the nearest new inked margin. No intraductal component nor lymphovascular invasion is seen. ER - %, CO - %, HER2 negative (score 0) and Ki-67 of 10%.  Today, pt is doing well. Surgical site healed. She denies fever, chill, SOB, CP, diarrhea/constipation.\par

## 2023-04-03 ENCOUNTER — NON-APPOINTMENT (OUTPATIENT)
Age: 88
End: 2023-04-03

## 2023-04-04 ENCOUNTER — APPOINTMENT (OUTPATIENT)
Dept: GYNECOLOGIC ONCOLOGY | Facility: CLINIC | Age: 88
End: 2023-04-04

## 2023-04-04 ENCOUNTER — OUTPATIENT (OUTPATIENT)
Dept: OUTPATIENT SERVICES | Facility: HOSPITAL | Age: 88
LOS: 1 days | End: 2023-04-04
Payer: MEDICARE

## 2023-04-04 ENCOUNTER — APPOINTMENT (OUTPATIENT)
Dept: GYNECOLOGIC ONCOLOGY | Facility: CLINIC | Age: 88
End: 2023-04-04
Payer: MEDICARE

## 2023-04-04 VITALS
TEMPERATURE: 98.3 F | DIASTOLIC BLOOD PRESSURE: 54 MMHG | WEIGHT: 120 LBS | SYSTOLIC BLOOD PRESSURE: 108 MMHG | HEIGHT: 63 IN | BODY MASS INDEX: 21.26 KG/M2 | HEART RATE: 60 BPM

## 2023-04-04 DIAGNOSIS — C50.411 MALIGNANT NEOPLASM OF UPPER-OUTER QUADRANT OF RIGHT FEMALE BREAST: ICD-10-CM

## 2023-04-04 DIAGNOSIS — Z98.890 OTHER SPECIFIED POSTPROCEDURAL STATES: Chronic | ICD-10-CM

## 2023-04-04 DIAGNOSIS — C54.1 MALIGNANT NEOPLASM OF ENDOMETRIUM: ICD-10-CM

## 2023-04-04 DIAGNOSIS — Z90.710 ACQUIRED ABSENCE OF BOTH CERVIX AND UTERUS: Chronic | ICD-10-CM

## 2023-04-04 DIAGNOSIS — Z17.0 MALIGNANT NEOPLASM OF UPPER-OUTER QUADRANT OF RIGHT FEMALE BREAST: ICD-10-CM

## 2023-04-04 DIAGNOSIS — Z98.49 CATARACT EXTRACTION STATUS, UNSPECIFIED EYE: Chronic | ICD-10-CM

## 2023-04-04 PROCEDURE — 99214 OFFICE O/P EST MOD 30 MIN: CPT

## 2023-04-04 NOTE — PHYSICAL EXAM
[Abnormal] : Focal defects were observed [Absent] : Adnexa(ae): Absent [Normal] : Recto-Vaginal Exam: Normal [FreeTextEntry1] : NAD [de-identified] : ASHLEY [de-identified] : no edema [de-identified] : soft NT/ND [de-identified] : significant resting tremor [de-identified] : cuff without lesions [de-identified] : no masses [Ambulatory and capable of all self care but unable to carry out any work activities] : Status 2- Ambulatory and capable of all self care but unable to carry out any work activities. Up and about more than 50% of waking hours

## 2023-04-04 NOTE — REVIEW OF SYSTEMS
[Negative] : Musculoskeletal [Rash] : no rash noted [Ulcer] : no ulcer was seen [Syncope] : no syncope noted [Seizures] : no seizures [Neuropathy] : no neuropathy [Depression] : no depression [Hematuria] : no hematuria [Dysuria] : no dysuria [Abn Vag Bleeding] : no abnormal vaginal bleeding [Fatigue] : no fatigue [Recent Wt Gain ___ Lbs] : no recent weight gain [Recent Wt Loss___ Lbs] : no recent weight loss [Chest Pain] : no chest pain [ALANIS] : no dyspnea on exertion [Wheezing] : no wheezing [Muscle Weakness] : no muscle weakness [FreeTextEntry2] : + tremor, reports worsening of her Parkinson's Sx

## 2023-04-04 NOTE — HISTORY OF PRESENT ILLNESS
[FreeTextEntry1] : 86 yo wth stage 1A PSC of the uterus\par \par [place]/BSO/P&PA LND omentectomy 12/2020 no residual ca in the specimen\par No adjuvant treatment\par \par No complaints\par Lower abdominal pain RLQ mostly for over 1 year now\par Had work up with MRI which was negative per the patient\par \par Progressing Parkinson's disease \par Dx with stage I breast cancer - localized, considering adjuvant aromatase inhibitor.\par \par Also has a history of lung cancer in 2018 and melanoma in the L thigh area resected about 15 yrs prior \par

## 2023-04-04 NOTE — DISCUSSION/SUMMARY
[Visit Time ___ Minutes] : [unfilled] minutes [Face to Face Time___ Minutes] : with [unfilled] minutes in face to face consultation. [FreeTextEntry1] : In terms of her endometrial cancer, there is no evidence of recurrence. She has a fairly good prognosis.\par \par She was however Dx with now her 4th primary cancer (3 adenocarcinomas) and I recommended she do genetic testing. Even if it doesn't affect her directly, the information may be valuable to her family.\par \par Patient is in agreement, all questions were answered. \par \par Return in 6 months

## 2023-04-05 DIAGNOSIS — C54.1 MALIGNANT NEOPLASM OF ENDOMETRIUM: ICD-10-CM

## 2023-04-06 ENCOUNTER — APPOINTMENT (OUTPATIENT)
Dept: SURGERY | Facility: CLINIC | Age: 88
End: 2023-04-06
Payer: MEDICARE

## 2023-04-06 VITALS
BODY MASS INDEX: 20.91 KG/M2 | OXYGEN SATURATION: 96 % | WEIGHT: 118 LBS | HEIGHT: 63 IN | HEART RATE: 73 BPM | SYSTOLIC BLOOD PRESSURE: 142 MMHG | DIASTOLIC BLOOD PRESSURE: 88 MMHG | TEMPERATURE: 96.8 F

## 2023-04-06 DIAGNOSIS — C50.211 MALIGNANT NEOPLASM OF UPPER-INNER QUADRANT OF RIGHT FEMALE BREAST: ICD-10-CM

## 2023-04-06 PROCEDURE — 99212 OFFICE O/P EST SF 10 MIN: CPT | Mod: 24

## 2023-04-06 NOTE — HISTORY OF PRESENT ILLNESS
[de-identified] : The patient returns for continued follow-up after her recent right breast lumpectomy.  She has no complaints of any new symptoms or changes in either breast, or regarding the recent right breast surgery.

## 2023-04-06 NOTE — ASSESSMENT
[FreeTextEntry1] :   No postoperative problems noted.  The patient is hesitant to initiate endocrine therapy as she is on multiple other medications, but I doubt she will have any significant reaction that may cause her to stop this medication and she was encouraged to begin the tamoxifen as prescribed by the oncologist.  All her questions were answered and she understands and agrees; she will return here in about 3 months for reexamination, or sooner as needed.

## 2023-04-06 NOTE — PHYSICAL EXAM
[de-identified] :  thin and frail [de-identified] :  the lumpectomy site in the right breast continues to heal without any problems.  There is some mild indentation and deformity due to postop changes,  as expected.  No new masses or suspicious areas noted in either breast.

## 2023-04-10 ENCOUNTER — APPOINTMENT (OUTPATIENT)
Dept: CARDIOLOGY | Facility: CLINIC | Age: 88
End: 2023-04-10
Payer: MEDICARE

## 2023-04-10 VITALS
SYSTOLIC BLOOD PRESSURE: 130 MMHG | HEIGHT: 63 IN | BODY MASS INDEX: 20.91 KG/M2 | DIASTOLIC BLOOD PRESSURE: 72 MMHG | OXYGEN SATURATION: 97 % | HEART RATE: 84 BPM | WEIGHT: 118 LBS

## 2023-04-10 DIAGNOSIS — I10 ESSENTIAL (PRIMARY) HYPERTENSION: ICD-10-CM

## 2023-04-10 DIAGNOSIS — I48.92 UNSPECIFIED ATRIAL FLUTTER: ICD-10-CM

## 2023-04-10 DIAGNOSIS — I47.29 OTHER VENTRICULAR TACHYCARDIA: ICD-10-CM

## 2023-04-10 DIAGNOSIS — E78.5 HYPERLIPIDEMIA, UNSPECIFIED: ICD-10-CM

## 2023-04-10 PROCEDURE — 99214 OFFICE O/P EST MOD 30 MIN: CPT

## 2023-04-10 PROCEDURE — 93000 ELECTROCARDIOGRAM COMPLETE: CPT

## 2023-04-10 RX ORDER — EMPAGLIFLOZIN 25 MG/1
25 TABLET, FILM COATED ORAL
Refills: 0 | Status: DISCONTINUED | COMMUNITY
End: 2023-04-10

## 2023-04-10 RX ORDER — OFLOXACIN 3 MG/ML
0.3 SOLUTION/ DROPS OPHTHALMIC
Qty: 5 | Refills: 0 | Status: DISCONTINUED | COMMUNITY
Start: 2022-11-07 | End: 2023-04-10

## 2023-04-10 RX ORDER — METOPROLOL TARTRATE 100 MG/1
100 TABLET, FILM COATED ORAL DAILY
Qty: 90 | Refills: 1 | Status: DISCONTINUED | COMMUNITY
End: 2023-04-10

## 2023-04-10 NOTE — CARDIOLOGY SUMMARY
[de-identified] : Holter 03/2021 showed bigeminy, 2 episodes of NSVT with max of 7 beats, SVT with max of 11 beats [de-identified] : ECHO 2021\par Normal LV function \par Severely enlarged LA \par Mild LV thickness \par Trace MR \par Moderate TR \par Mild AS \par Mild [place] \par Severe PAH \par  [de-identified] : Impression: 2021\par 1. IV Lexiscan Dual Isotope Study which was negative with respect to symptoms and EKG changes.\par 2. Myocardial perfusion imaging reveals no fixed no reperfusion defects\par 3. Gated imaging reveals normal wall motion thickening and ejection fraction

## 2023-04-10 NOTE — ASSESSMENT
[FreeTextEntry1] : Ms. Nicole is an 87-year-old woman with history of severe pulmonary hypertension, atrial flutter on AC, recently diagnosed breast CA, RUL lung adenocarcinoma s/p wedge resection, DM2, HTN, hypothyroidism, and Parkinson's disease presenting for a follow up visit for management of pulmonary hypertension and AF.\par \par Impression:\par (1) Moderate pHTN based on TTE from 9/2022. Unclear group, EF is preserved, NM stress neg in 2021.\par (2) AFL, CHADSVASc at least 5 (Age +2, F, HTN, DM2), on AC\par (3) Hypertension, previously poorly controlled, improved\par (4) HLD, on atorvastatin, LDL 71, HDL 51, TG 98, , Non-HDL 82\par (5) Poorly controlled IDDM2, A1c 10.2 in 2021. On Glimepiride, Jardiance, metformin, insulin\par (6) CKD3, GFR 40s\par (7) RUL lung adenocarcinoma\par (8) Hypothyroidism on synthroid\par (9) Parkinson's disease, on carbidopa-levodopa\par (10) Breast CA, diagnosed 1/2023, planned for excisional surgery\par \par Plan:\par - Continue AC, low dose given Age >80 and Weight <60kg. Hold for 48 hours prior to any surgery and resume as soon as deemed safe by the surgical team.\par - Continue atorvastatin given history of DM2\par - Continue nifedipine, metoprolol, and hydralazine; not my preferred regimen but seems to be working for her.\par - DM2 management deferred to PCP; agree with use of Jardiance for cardiac benefit.\par - Repeat cardiometabolic labs prior to next visit including pBNP. May require a diuretic.\par \par RTC 3-6 months. Repeat by end of year.

## 2023-04-10 NOTE — HISTORY OF PRESENT ILLNESS
[FreeTextEntry1] : Ms. Nicole is an 87-year-old woman with history of severe pulmonary hypertension, atrial flutter on AC, recently diagnosed breast CA, RUL lung adenocarcinoma s/p wedge resection, DM2, HTN, hypothyroidism, and Parkinson's disease presenting for a follow up visit for management of pulmonary hypertension and AF.\par \par Patient previously followed with Dr. Glez and was last seen in clinic 7/5/22. At that time, she reported feeling well but with very labile blood pressures at home (sometimes as high as the 230s in the afternoon, sometimes SBP <110). \par \par First visit 9/8:\par "...Today, patient is accompanied by her son. She states she is her for pre-operative assessment prior to breast mass excision under local anesthesia.  Overall, she feels fatigued with some dyspnea on exertion that has somewhat worsened since her last visit with cardiology. She denies chest pain, palpitations, pre-syncope, syncope, LE swelling, PND, or orthopnea."\par \par She was recently diagnosed with ER/WA positive breast CA via biopsy and underwent excisional surgery. She denies active cardiopulmonary complaints at rest; does have some dyspnea with exertion that is unchanged over the past few years (unchanged since NM stress 10/2021).\par \par ECG shows rate controlled AFL (significant artifact from tremor; possible AF)\par \par TTE 9/2022\par - Normal biventricular function (hyperdynamic LV), moderate TR with moderate pHTN (PASP = 51 mmHg), mild AS, moderate MAC\par \par

## 2023-04-10 NOTE — PHYSICAL EXAM
[No Acute Distress] : no acute distress [Normal Conjunctiva] : normal conjunctiva [Normal Venous Pressure] : normal venous pressure [No Carotid Bruit] : no carotid bruit [Normal S1, S2] : normal S1, S2 [No Rub] : no rub [No Gallop] : no gallop [Clear Lung Fields] : clear lung fields [Good Air Entry] : good air entry [No Respiratory Distress] : no respiratory distress  [Soft] : abdomen soft [Non Tender] : non-tender [No Masses/organomegaly] : no masses/organomegaly [Normal Bowel Sounds] : normal bowel sounds [Abnormal Gait] : abnormal gait [No Cyanosis] : no cyanosis [No Clubbing] : no clubbing [No Varicosities] : no varicosities [No Rash] : no rash [No Skin Lesions] : no skin lesions [Moves all extremities] : moves all extremities [No Focal Deficits] : no focal deficits [Normal Speech] : normal speech [Alert and Oriented] : alert and oriented [Normal memory] : normal memory [de-identified] : Elderly woman, frail [de-identified] : 2/4 diastolic murmur heard best at the LLSB. [de-identified] : Trace LE edema [de-identified] : Pill-rolling tremor

## 2023-04-11 DIAGNOSIS — C54.1 MALIGNANT NEOPLASM OF ENDOMETRIUM: ICD-10-CM

## 2023-04-11 DIAGNOSIS — C50.411 MALIGNANT NEOPLASM OF UPPER-OUTER QUADRANT OF RIGHT FEMALE BREAST: ICD-10-CM

## 2023-04-18 ENCOUNTER — NON-APPOINTMENT (OUTPATIENT)
Age: 88
End: 2023-04-18

## 2023-04-18 RX ORDER — ANASTROZOLE TABLETS 1 MG/1
1 TABLET ORAL
Qty: 90 | Refills: 2 | Status: ACTIVE | COMMUNITY
Start: 2023-04-18 | End: 1900-01-01

## 2023-05-16 ENCOUNTER — APPOINTMENT (OUTPATIENT)
Dept: CARDIOLOGY | Facility: CLINIC | Age: 88
End: 2023-05-16

## 2023-05-24 NOTE — ED PROVIDER NOTE - PRINCIPAL DIAGNOSIS
Other closed nondisplaced fracture of distal end of right humerus with routine healing, subsequent encounter 24-May-2023

## 2023-06-29 ENCOUNTER — APPOINTMENT (OUTPATIENT)
Dept: HEMATOLOGY ONCOLOGY | Facility: CLINIC | Age: 88
End: 2023-06-29

## 2023-07-04 ENCOUNTER — INPATIENT (INPATIENT)
Facility: HOSPITAL | Age: 88
LOS: 6 days | Discharge: HOME | DRG: 177 | End: 2023-07-11
Attending: INTERNAL MEDICINE | Admitting: INTERNAL MEDICINE
Payer: MEDICARE

## 2023-07-04 VITALS
HEIGHT: 64 IN | OXYGEN SATURATION: 96 % | DIASTOLIC BLOOD PRESSURE: 81 MMHG | TEMPERATURE: 97 F | RESPIRATION RATE: 16 BRPM | HEART RATE: 74 BPM | WEIGHT: 111.99 LBS | SYSTOLIC BLOOD PRESSURE: 185 MMHG

## 2023-07-04 DIAGNOSIS — Z98.49 CATARACT EXTRACTION STATUS, UNSPECIFIED EYE: Chronic | ICD-10-CM

## 2023-07-04 DIAGNOSIS — U07.1 COVID-19: ICD-10-CM

## 2023-07-04 DIAGNOSIS — Z90.710 ACQUIRED ABSENCE OF BOTH CERVIX AND UTERUS: Chronic | ICD-10-CM

## 2023-07-04 DIAGNOSIS — Z98.890 OTHER SPECIFIED POSTPROCEDURAL STATES: Chronic | ICD-10-CM

## 2023-07-04 LAB
ALBUMIN SERPL ELPH-MCNC: 3.7 G/DL — SIGNIFICANT CHANGE UP (ref 3.5–5.2)
ALP SERPL-CCNC: 105 U/L — SIGNIFICANT CHANGE UP (ref 30–115)
ALT FLD-CCNC: 50 U/L — HIGH (ref 0–41)
ANION GAP SERPL CALC-SCNC: 10 MMOL/L — SIGNIFICANT CHANGE UP (ref 7–14)
AST SERPL-CCNC: 74 U/L — HIGH (ref 0–41)
BASOPHILS # BLD AUTO: 0.03 K/UL — SIGNIFICANT CHANGE UP (ref 0–0.2)
BASOPHILS NFR BLD AUTO: 0.4 % — SIGNIFICANT CHANGE UP (ref 0–1)
BILIRUB SERPL-MCNC: 0.8 MG/DL — SIGNIFICANT CHANGE UP (ref 0.2–1.2)
BUN SERPL-MCNC: 26 MG/DL — HIGH (ref 10–20)
CALCIUM SERPL-MCNC: 9.3 MG/DL — SIGNIFICANT CHANGE UP (ref 8.4–10.5)
CHLORIDE SERPL-SCNC: 99 MMOL/L — SIGNIFICANT CHANGE UP (ref 98–110)
CO2 SERPL-SCNC: 25 MMOL/L — SIGNIFICANT CHANGE UP (ref 17–32)
CREAT SERPL-MCNC: 0.9 MG/DL — SIGNIFICANT CHANGE UP (ref 0.7–1.5)
EGFR: 63 ML/MIN/1.73M2 — SIGNIFICANT CHANGE UP
EOSINOPHIL # BLD AUTO: 0 K/UL — SIGNIFICANT CHANGE UP (ref 0–0.7)
EOSINOPHIL NFR BLD AUTO: 0 % — SIGNIFICANT CHANGE UP (ref 0–8)
GLUCOSE BLDC GLUCOMTR-MCNC: 262 MG/DL — HIGH (ref 70–99)
GLUCOSE SERPL-MCNC: 109 MG/DL — HIGH (ref 70–99)
HCT VFR BLD CALC: 35.3 % — LOW (ref 37–47)
HGB BLD-MCNC: 11.5 G/DL — LOW (ref 12–16)
IMM GRANULOCYTES NFR BLD AUTO: 0.6 % — HIGH (ref 0.1–0.3)
LACTATE SERPL-SCNC: 0.8 MMOL/L — SIGNIFICANT CHANGE UP (ref 0.7–2)
LYMPHOCYTES # BLD AUTO: 0.44 K/UL — LOW (ref 1.2–3.4)
LYMPHOCYTES # BLD AUTO: 6.3 % — LOW (ref 20.5–51.1)
MAGNESIUM SERPL-MCNC: 1.8 MG/DL — SIGNIFICANT CHANGE UP (ref 1.8–2.4)
MCHC RBC-ENTMCNC: 28.5 PG — SIGNIFICANT CHANGE UP (ref 27–31)
MCHC RBC-ENTMCNC: 32.6 G/DL — SIGNIFICANT CHANGE UP (ref 32–37)
MCV RBC AUTO: 87.4 FL — SIGNIFICANT CHANGE UP (ref 81–99)
MONOCYTES # BLD AUTO: 0.77 K/UL — HIGH (ref 0.1–0.6)
MONOCYTES NFR BLD AUTO: 11 % — HIGH (ref 1.7–9.3)
NEUTROPHILS # BLD AUTO: 5.71 K/UL — SIGNIFICANT CHANGE UP (ref 1.4–6.5)
NEUTROPHILS NFR BLD AUTO: 81.7 % — HIGH (ref 42.2–75.2)
NRBC # BLD: 0 /100 WBCS — SIGNIFICANT CHANGE UP (ref 0–0)
NT-PROBNP SERPL-SCNC: 8670 PG/ML — HIGH (ref 0–300)
PLATELET # BLD AUTO: 202 K/UL — SIGNIFICANT CHANGE UP (ref 130–400)
PMV BLD: 11.9 FL — HIGH (ref 7.4–10.4)
POTASSIUM SERPL-MCNC: 4.8 MMOL/L — SIGNIFICANT CHANGE UP (ref 3.5–5)
POTASSIUM SERPL-SCNC: 4.8 MMOL/L — SIGNIFICANT CHANGE UP (ref 3.5–5)
PROT SERPL-MCNC: 6.4 G/DL — SIGNIFICANT CHANGE UP (ref 6–8)
RBC # BLD: 4.04 M/UL — LOW (ref 4.2–5.4)
RBC # FLD: 14.7 % — HIGH (ref 11.5–14.5)
SODIUM SERPL-SCNC: 134 MMOL/L — LOW (ref 135–146)
TROPONIN T SERPL-MCNC: <0.01 NG/ML — SIGNIFICANT CHANGE UP
WBC # BLD: 6.99 K/UL — SIGNIFICANT CHANGE UP (ref 4.8–10.8)
WBC # FLD AUTO: 6.99 K/UL — SIGNIFICANT CHANGE UP (ref 4.8–10.8)

## 2023-07-04 PROCEDURE — 72170 X-RAY EXAM OF PELVIS: CPT | Mod: 26

## 2023-07-04 PROCEDURE — 76705 ECHO EXAM OF ABDOMEN: CPT

## 2023-07-04 PROCEDURE — 86140 C-REACTIVE PROTEIN: CPT

## 2023-07-04 PROCEDURE — 82140 ASSAY OF AMMONIA: CPT

## 2023-07-04 PROCEDURE — 80048 BASIC METABOLIC PNL TOTAL CA: CPT

## 2023-07-04 PROCEDURE — 84443 ASSAY THYROID STIM HORMONE: CPT

## 2023-07-04 PROCEDURE — 84484 ASSAY OF TROPONIN QUANT: CPT

## 2023-07-04 PROCEDURE — 97116 GAIT TRAINING THERAPY: CPT | Mod: GP

## 2023-07-04 PROCEDURE — 80061 LIPID PANEL: CPT

## 2023-07-04 PROCEDURE — 84100 ASSAY OF PHOSPHORUS: CPT

## 2023-07-04 PROCEDURE — 99285 EMERGENCY DEPT VISIT HI MDM: CPT | Mod: FS

## 2023-07-04 PROCEDURE — 84145 PROCALCITONIN (PCT): CPT

## 2023-07-04 PROCEDURE — 83036 HEMOGLOBIN GLYCOSYLATED A1C: CPT

## 2023-07-04 PROCEDURE — 71045 X-RAY EXAM CHEST 1 VIEW: CPT | Mod: 26

## 2023-07-04 PROCEDURE — 80074 ACUTE HEPATITIS PANEL: CPT

## 2023-07-04 PROCEDURE — 83735 ASSAY OF MAGNESIUM: CPT

## 2023-07-04 PROCEDURE — 82728 ASSAY OF FERRITIN: CPT

## 2023-07-04 PROCEDURE — 86704 HEP B CORE ANTIBODY TOTAL: CPT

## 2023-07-04 PROCEDURE — 97530 THERAPEUTIC ACTIVITIES: CPT | Mod: GP

## 2023-07-04 PROCEDURE — 70450 CT HEAD/BRAIN W/O DYE: CPT | Mod: 26,MA

## 2023-07-04 PROCEDURE — 82962 GLUCOSE BLOOD TEST: CPT

## 2023-07-04 PROCEDURE — 86803 HEPATITIS C AB TEST: CPT

## 2023-07-04 PROCEDURE — 36415 COLL VENOUS BLD VENIPUNCTURE: CPT

## 2023-07-04 PROCEDURE — 97162 PT EVAL MOD COMPLEX 30 MIN: CPT | Mod: GP

## 2023-07-04 PROCEDURE — 72125 CT NECK SPINE W/O DYE: CPT | Mod: 26,MA

## 2023-07-04 PROCEDURE — 86780 TREPONEMA PALLIDUM: CPT

## 2023-07-04 PROCEDURE — 99223 1ST HOSP IP/OBS HIGH 75: CPT

## 2023-07-04 PROCEDURE — 85652 RBC SED RATE AUTOMATED: CPT

## 2023-07-04 PROCEDURE — 83550 IRON BINDING TEST: CPT

## 2023-07-04 PROCEDURE — 85025 COMPLETE CBC W/AUTO DIFF WBC: CPT

## 2023-07-04 PROCEDURE — 82746 ASSAY OF FOLIC ACID SERUM: CPT

## 2023-07-04 PROCEDURE — 97110 THERAPEUTIC EXERCISES: CPT | Mod: GP

## 2023-07-04 PROCEDURE — 85379 FIBRIN DEGRADATION QUANT: CPT

## 2023-07-04 PROCEDURE — 82607 VITAMIN B-12: CPT

## 2023-07-04 PROCEDURE — 83540 ASSAY OF IRON: CPT

## 2023-07-04 PROCEDURE — 80053 COMPREHEN METABOLIC PANEL: CPT

## 2023-07-04 PROCEDURE — 0225U NFCT DS DNA&RNA 21 SARSCOV2: CPT

## 2023-07-04 PROCEDURE — 93010 ELECTROCARDIOGRAM REPORT: CPT

## 2023-07-04 PROCEDURE — 85027 COMPLETE CBC AUTOMATED: CPT

## 2023-07-04 PROCEDURE — 93306 TTE W/DOPPLER COMPLETE: CPT

## 2023-07-04 PROCEDURE — 86706 HEP B SURFACE ANTIBODY: CPT

## 2023-07-04 PROCEDURE — 93005 ELECTROCARDIOGRAM TRACING: CPT

## 2023-07-04 PROCEDURE — 83615 LACTATE (LD) (LDH) ENZYME: CPT

## 2023-07-04 PROCEDURE — 82533 TOTAL CORTISOL: CPT

## 2023-07-04 RX ORDER — LEVOTHYROXINE SODIUM 125 MCG
1 TABLET ORAL
Qty: 0 | Refills: 0 | DISCHARGE

## 2023-07-04 RX ORDER — DEXTROSE 50 % IN WATER 50 %
12.5 SYRINGE (ML) INTRAVENOUS ONCE
Refills: 0 | Status: DISCONTINUED | OUTPATIENT
Start: 2023-07-04 | End: 2023-07-11

## 2023-07-04 RX ORDER — DEXAMETHASONE 0.5 MG/5ML
6 ELIXIR ORAL ONCE
Refills: 0 | Status: COMPLETED | OUTPATIENT
Start: 2023-07-04 | End: 2023-07-04

## 2023-07-04 RX ORDER — HYDRALAZINE HCL 50 MG
1 TABLET ORAL
Qty: 0 | Refills: 0 | DISCHARGE

## 2023-07-04 RX ORDER — DEXTROSE 50 % IN WATER 50 %
15 SYRINGE (ML) INTRAVENOUS ONCE
Refills: 0 | Status: DISCONTINUED | OUTPATIENT
Start: 2023-07-04 | End: 2023-07-11

## 2023-07-04 RX ORDER — ACARBOSE 25 MG/1
1 TABLET ORAL
Refills: 0 | DISCHARGE

## 2023-07-04 RX ORDER — HYDRALAZINE HCL 50 MG
50 TABLET ORAL
Refills: 0 | Status: DISCONTINUED | OUTPATIENT
Start: 2023-07-04 | End: 2023-07-11

## 2023-07-04 RX ORDER — TELMISARTAN 20 MG/1
1 TABLET ORAL
Qty: 0 | Refills: 0 | DISCHARGE

## 2023-07-04 RX ORDER — GLUCAGON INJECTION, SOLUTION 0.5 MG/.1ML
1 INJECTION, SOLUTION SUBCUTANEOUS ONCE
Refills: 0 | Status: DISCONTINUED | OUTPATIENT
Start: 2023-07-04 | End: 2023-07-11

## 2023-07-04 RX ORDER — ENOXAPARIN SODIUM 100 MG/ML
40 INJECTION SUBCUTANEOUS EVERY 24 HOURS
Refills: 0 | Status: DISCONTINUED | OUTPATIENT
Start: 2023-07-04 | End: 2023-07-04

## 2023-07-04 RX ORDER — SODIUM CHLORIDE 9 MG/ML
1000 INJECTION, SOLUTION INTRAVENOUS
Refills: 0 | Status: DISCONTINUED | OUTPATIENT
Start: 2023-07-04 | End: 2023-07-11

## 2023-07-04 RX ORDER — LANOLIN ALCOHOL/MO/W.PET/CERES
3 CREAM (GRAM) TOPICAL AT BEDTIME
Refills: 0 | Status: DISCONTINUED | OUTPATIENT
Start: 2023-07-04 | End: 2023-07-11

## 2023-07-04 RX ORDER — LEVOTHYROXINE SODIUM 125 MCG
100 TABLET ORAL DAILY
Refills: 0 | Status: DISCONTINUED | OUTPATIENT
Start: 2023-07-04 | End: 2023-07-11

## 2023-07-04 RX ORDER — APIXABAN 2.5 MG/1
1 TABLET, FILM COATED ORAL
Qty: 0 | Refills: 0 | DISCHARGE

## 2023-07-04 RX ORDER — METOPROLOL TARTRATE 50 MG
1 TABLET ORAL
Qty: 0 | Refills: 0 | DISCHARGE

## 2023-07-04 RX ORDER — ACETAMINOPHEN 500 MG
650 TABLET ORAL EVERY 6 HOURS
Refills: 0 | Status: DISCONTINUED | OUTPATIENT
Start: 2023-07-04 | End: 2023-07-11

## 2023-07-04 RX ORDER — DEXTROSE 50 % IN WATER 50 %
25 SYRINGE (ML) INTRAVENOUS ONCE
Refills: 0 | Status: DISCONTINUED | OUTPATIENT
Start: 2023-07-04 | End: 2023-07-11

## 2023-07-04 RX ORDER — APIXABAN 2.5 MG/1
2.5 TABLET, FILM COATED ORAL EVERY 12 HOURS
Refills: 0 | Status: DISCONTINUED | OUTPATIENT
Start: 2023-07-04 | End: 2023-07-11

## 2023-07-04 RX ORDER — METOPROLOL TARTRATE 50 MG
100 TABLET ORAL
Refills: 0 | Status: DISCONTINUED | OUTPATIENT
Start: 2023-07-04 | End: 2023-07-07

## 2023-07-04 RX ORDER — ANASTROZOLE 1 MG/1
1 TABLET ORAL DAILY
Refills: 0 | Status: DISCONTINUED | OUTPATIENT
Start: 2023-07-04 | End: 2023-07-11

## 2023-07-04 RX ORDER — MULTIVIT-MIN/FERROUS GLUCONATE 9 MG/15 ML
0 LIQUID (ML) ORAL
Qty: 0 | Refills: 0 | DISCHARGE

## 2023-07-04 RX ORDER — FUROSEMIDE 40 MG
20 TABLET ORAL DAILY
Refills: 0 | Status: DISCONTINUED | OUTPATIENT
Start: 2023-07-04 | End: 2023-07-11

## 2023-07-04 RX ORDER — ANASTROZOLE 1 MG/1
1 TABLET ORAL
Refills: 0 | DISCHARGE

## 2023-07-04 RX ORDER — INSULIN LISPRO 100/ML
VIAL (ML) SUBCUTANEOUS
Refills: 0 | Status: DISCONTINUED | OUTPATIENT
Start: 2023-07-04 | End: 2023-07-05

## 2023-07-04 RX ORDER — METFORMIN HYDROCHLORIDE 850 MG/1
1 TABLET ORAL
Qty: 0 | Refills: 0 | DISCHARGE

## 2023-07-04 RX ORDER — INSULIN GLARGINE 100 [IU]/ML
16 INJECTION, SOLUTION SUBCUTANEOUS
Refills: 0 | DISCHARGE

## 2023-07-04 RX ORDER — FERROUS SULFATE 325(65) MG
1 TABLET ORAL
Qty: 0 | Refills: 0 | DISCHARGE

## 2023-07-04 RX ORDER — ONDANSETRON 8 MG/1
4 TABLET, FILM COATED ORAL EVERY 8 HOURS
Refills: 0 | Status: DISCONTINUED | OUTPATIENT
Start: 2023-07-04 | End: 2023-07-11

## 2023-07-04 RX ORDER — CHOLECALCIFEROL (VITAMIN D3) 125 MCG
0 CAPSULE ORAL
Qty: 0 | Refills: 0 | DISCHARGE

## 2023-07-04 RX ORDER — PREGABALIN 225 MG/1
1 CAPSULE ORAL
Qty: 0 | Refills: 0 | DISCHARGE

## 2023-07-04 RX ORDER — INSULIN GLARGINE 100 [IU]/ML
13 INJECTION, SOLUTION SUBCUTANEOUS AT BEDTIME
Refills: 0 | Status: DISCONTINUED | OUTPATIENT
Start: 2023-07-04 | End: 2023-07-05

## 2023-07-04 RX ORDER — ACETAMINOPHEN 500 MG
650 TABLET ORAL ONCE
Refills: 0 | Status: COMPLETED | OUTPATIENT
Start: 2023-07-04 | End: 2023-07-04

## 2023-07-04 RX ORDER — NIFEDIPINE 30 MG
1 TABLET, EXTENDED RELEASE 24 HR ORAL
Qty: 0 | Refills: 0 | DISCHARGE

## 2023-07-04 RX ORDER — GLIMEPIRIDE 1 MG
1 TABLET ORAL
Refills: 0 | DISCHARGE

## 2023-07-04 RX ADMIN — Medication 6 MILLIGRAM(S): at 16:04

## 2023-07-04 RX ADMIN — Medication 20 MILLIGRAM(S): at 21:47

## 2023-07-04 RX ADMIN — Medication 650 MILLIGRAM(S): at 16:04

## 2023-07-04 RX ADMIN — INSULIN GLARGINE 13 UNIT(S): 100 INJECTION, SOLUTION SUBCUTANEOUS at 23:04

## 2023-07-04 NOTE — ED ADULT NURSE NOTE - NSICDXPASTMEDICALHX_GEN_ALL_CORE_FT
PAST MEDICAL HISTORY:  Atrial flutter PER CARDIAC NOTE    Back pain pain management injections dr rodriguez    CKD (chronic kidney disease) stage 3A   PT DENIES    DM (diabetes mellitus)     History of pulmonary hypertension     HLD (hyperlipidemia) PT DENIES    [place] (hard of hearing)     HTN (hypertension)     Hypothyroidism last blood test ~ 3months ago Spet 2022   no changes in meds    Nodule of right lung s/p resection    Occasional tremors X 10 + YEARS    Severe pulmonary hypertension

## 2023-07-04 NOTE — ED PROVIDER NOTE - OTHER RECORDS SUMMARY FREE TEXT FOR MDM OBTAINED AND REVIEWED OLD RECORDS QUESTION
Transfer notes from [facility]. Sumi Navarrete), Pediatrics  [address] [place] [address] [address] [place] [address]  Phone: [phone]  Fax: [phone]

## 2023-07-04 NOTE — ED ADULT NURSE REASSESSMENT NOTE - NS ED NURSE REASSESS COMMENT FT1
patient admitted to medicine and moved to ED3 bed 15. Report given to MORIAH Combs. Patient in stable condition and nad. patient admitted to medicine and moved to ED3 bed 15. Report given to MORIAH Brandt. Patient in stable condition and nad.

## 2023-07-04 NOTE — H&P ADULT - NSHPPHYSICALEXAM_GEN_ALL_CORE
VITAL SIGNS: AFebrile, vital signs stable  CONSTITUTIONAL: Well-developed; well-nourished; in no acute distress.  SKIN: Skin exam is warm and dry, no acute rash.  HEAD: Normocephalic; atraumatic.  EYES: Pupils equal round reactive to light, Extraocular movements intact; conjunctiva and sclera clear.  ENT: No nasal discharge; airway clear. Moist mucus membranes.  NECK: Supple; non tender. No rigidity. +ve JVD  CARD: Regular rate and rhythm. Normal S1, S2; click heard on left 2nd intercostal space. no murmurs, gallops, or rubs.  RESP: Lungs clear to auscultation bilaterally. No wheezes, rales or rhonchi.  ABD: Abdomen soft; non-tender; non-distended; No costovertebral angle tenderness.   EXT: 2+ b/l LE edema. No calf tenderness to palpation.  NEURO: Very sleepy, can't participate in exam.

## 2023-07-04 NOTE — ED ADULT TRIAGE NOTE - CHIEF COMPLAINT QUOTE
VIPIN from [facility] for abnormal vitals. +COVID. No home O2, placed on O2 by EMS for low oxygen. Despite NH paperwork no recent fall.

## 2023-07-04 NOTE — H&P ADULT - ATTENDING COMMENTS
87-year-old female w/ Severe PHTN, CKD2, DM, HTN, DL, hypothyroidism, aflutter, invasive ductal carcinoma s/p lumpectomy, endometrial cancer s/p omentectomy s/p pelvic wash, left adrenal nodule presenting for hypoxia requiring oxygen at NH, malaise. She slipped and fell as well. Trauma w/u -ve (XR Pelvis, CT H&N).    Agree  with assessment  except for changes below.     Vital Signs (24 Hrs):  T(C): 36.7 (07-04-23 @ 22:33), Max: 39.6 (07-04-23 @ 14:08)  HR: 57 (07-04-23 @ 22:33) (54 - 74)  BP: 144/63 (07-04-23 @ 22:33) (138/65 - 185/81)  RR: 18 (07-04-23 @ 22:33) (16 - 18)  SpO2: 100% (07-04-23 @ 22:33) (94% - 100%)  Daily Height in cm: 162.56 (04 Jul 2023 12:13)      ECHO   Summary:   1. Normal global left ventricular systolic function.   2. Severely enlarged left atrium.   3. Mildly increased LV wall thickness.   4. The left ventricular diastolic function could not be assessed in this study.   5. Normal right atrial size.   6. Moderate to severe mitral annular calcification.   7. Trace mitral valve regurgitation.   8. Mild-moderate tricuspid regurgitation.   9. Mild aortic valve stenosis.  10. Mild pulmonic valve regurgitation.  11. Estimated pulmonary artery systolic pressure is 70.4 mmHg assuming a right atrial pressure of 3 mmHg, which is consistent with severe pulmonary hypertension.  12. Pulmonary hypertension is present.  13. Increased relative wall thickness with normal mass index consistent with left ventricular concentric remodeling.  14. LA volume Index is 49.2 ml/m² ml/m2.  15. Peak transaortic gradient equals 16.3 mmHg, mean transaortic gradient equals 8.3 mmHg, the calculated aortic valve area equals 1.15 cm² by the continuity equation consistent with mild aortic stenosis.  16. The aortic valve mean gradient is 8.3 mmHg consistent with mild aortic stenosis.          IMPRESSION  Acute  Hypoxic  Respiratory  Failure  Secondary to Likely COVID  Pneumonia   AMS  Likely metabolic  Encephalopathy   F/u Blood CX, Urine Cx, MRSA, Urine strep and legionella, Procal   Elevated  BNP  Echo As above   Chest X-Ray Unremarkable   > Isolation precautions (contact, droplet, airborne)  >Start  Decadron 6mg IV  Daily  >Remdesivir protocol   >Continue airborne  Isolation   >Please monitor  O2 sat q8  and vitals  >Please  follow up inflammatory markers (D-dimer, CRP, ferritin) Q48-72H if clinically worsening  >Incentive spirometry at bedside  >Titrate supplemental O2 to maintain SpO2 92-96%  > APAP PRN. Anti-tussives PRN. Supplemental O2 PRN. ID consult. AC as per [facility] COVID Protocol     Likely Unspecified HF   Mild-Mod TR  Mild AS  Also Pulm HTN   continue lasix   Monitor  Daily Salt intake   OP cardiology F/U       Transaminitis   - Send, check ammonia , hold hepatotoxic medications. and trend LFTs and INR if no improvement hepatology consult, consider RUQ US.     Left Adrenal  Nodule -  OP MRI     Hx Back Pain     Hx CKD   No need for urgent RRT  Monitor Cr/BUN Electrolytes including Phosphorus  Avoid Nephro toxins     Hx HTN - Hydralazine     Hx Afib/Flutter - Eliquis,  metoprolol    Hx DM   Hold oral meds;  check fs;  Start insulin  regimen if  serum Glucose >180, start insulin  protocol and adjust insulin  Lantus/Lispro,  Hold for Hypoglycemia Goal  Gaol 140-180     Hx Invasive ductal carcinoma s/p lumpectomy  Hx  endometrial cancer s/p omentectomy s/p pelvic wash  hx right lung nodule resection  TAHBSO  - outpatient f/u  - c/w anastrazole    Hx  Hypothyroidism - Synthroid 87-year-old female w/ Severe PHTN, CKD2, DM, HTN, DL, hypothyroidism, aflutter, invasive ductal carcinoma s/p lumpectomy, endometrial cancer s/p omentectomy s/p pelvic wash, left adrenal nodule presenting for hypoxia requiring oxygen at NH, malaise. She slipped and fell as well. Trauma w/u -ve (XR Pelvis, CT H&N).    Agree  with assessment  except for changes below.   Vital Signs (24 Hrs):  T(C): 36.7 (07-04-23 @ 22:33), Max: 39.6 (07-04-23 @ 14:08)  HR: 57 (07-04-23 @ 22:33) (54 - 74)  BP: 144/63 (07-04-23 @ 22:33) (138/65 - 185/81)  RR: 18 (07-04-23 @ 22:33) (16 - 18)  SpO2: 100% (07-04-23 @ 22:33) (94% - 100%)  Daily Height in cm: 162.56 (04 Jul 2023 12:13)      PHYSICAL EXAM  GENERAL: NAD,  HEAD:  NCAT, EOMI, MM  NECK: Supple, Nontender  NERVOUS SYSTEM:  AAOx2-3, NFD  CHEST/LUNG: +bs b/l fine Crackles, No wheezing   HEART: +s1s2 RRR  ABDOMEN: soft, NT/ND  EXTREMITIES:  pp, 2+ edema  SKIN: age related skin changes     ECHO Summary:   1. Normal global left ventricular systolic function.   2. Severely enlarged left atrium.   3. Mildly increased LV wall thickness.   4. The left ventricular diastolic function could not be assessed in this study.   5. Normal right atrial size.   6. Moderate to severe mitral annular calcification.   7. Trace mitral valve regurgitation.   8. Mild-moderate tricuspid regurgitation.   9. Mild aortic valve stenosis.  10. Mild pulmonic valve regurgitation.  11. Estimated pulmonary artery systolic pressure is 70.4 mmHg assuming a right atrial pressure of 3 mmHg, which is consistent with severe pulmonary hypertension.  12. Pulmonary hypertension is present.  13. Increased relative wall thickness with normal mass index consistent with left ventricular concentric remodeling.  14. LA volume Index is 49.2 ml/m² ml/m2.  15. Peak transaortic gradient equals 16.3 mmHg, mean transaortic gradient equals 8.3 mmHg, the calculated aortic valve area equals 1.15 cm² by the continuity equation consistent with mild aortic stenosis.  16. The aortic valve mean gradient is 8.3 mmHg consistent with mild aortic stenosis.    IMPRESSION  Acute  Hypoxic  Respiratory  Failure  Secondary to Likely COVID  Pneumonia   AMS  Likely metabolic  Encephalopathy   F/u Blood CX, Urine Cx, MRSA, Urine strep and legionella, Procal   Elevated  BNP  Echo As above   Chest X-Ray Unremarkable   > Isolation precautions (contact, droplet, airborne)  >Start  Decadron 6mg IV  Daily  >Remdesivir protocol   f/u  ID   >Continue airborne  Isolation   >Please monitor  O2 sat q8  and vitals  >Please  follow up inflammatory markers (D-dimer, CRP, ferritin) Q48-72H if clinically worsening  >Incentive spirometry at bedside  >Titrate supplemental O2 to maintain SpO2 92-96%  > APAP PRN. Anti-tussives PRN. Supplemental O2 PRN. ID consult. AC as per [facility] COVID Protocol     Likely Unspecified HF   Mild-Mod TR  Mild AS  Also Pulm HTN   continue lasix   Monitor  Daily Salt intake   OP cardiology F/U   Transaminitis   - Send, check ammonia , hold hepatotoxic medications. and trend LFTs and INR if no improvement hepatology consult, consider RUQ US.   Left Adrenal  Nodule -  OP MRI       Hx CKD  2  No need for urgent RRT  Monitor Cr/BUN Electrolytes including Phosphorus  Avoid Nephro toxins     Hx HTN - Hydralazine     Hx Afib/Flutter - Eliquis,  metoprolol  Hx DM   Hold oral meds;  check fs;  Start insulin  regimen if  serum Glucose >180, start insulin  protocol and adjust insulin  Lantus/Lispro,  Hold for Hypoglycemia Goal  Gaol 140-180     Hx Invasive ductal carcinoma s/p lumpectomy  Hx  endometrial cancer s/p omentectomy s/p pelvic wash  hx right lung nodule resection  TAHBSO  - outpatient f/u  - c/w anastrazole  Hx  Hypothyroidism - Synthroid  Seen on 07/04/23

## 2023-07-04 NOTE — ED PROVIDER NOTE - ATTENDING APP SHARED VISIT CONTRIBUTION OF CARE
87-year-old female sent from [facility], for low O2 sat.  Patient with h/o CAITLYN, DM, HTN, HLD, R femur fx a few months ago, sent to ER from [facility] with 1 day h/o lethargy, weakness, tested + for COVID-19 today.  Patient was reaching for call bell, slipped and fell onto ground.  Found by staff on floor.  Patient denies any injuries from fall, no pain.  Denies any HA, neck pain.  No CP/SOB.  No abdominal pain.  No N/V/D.  VS at [facility] /97, O2 sat 92 percent, so sent to ER for eval.  PE - elderly female, NAD, nc/at, eomi, perrl, op - clear, mm dry, neck supple, cta b/l, no w/r/r, rrr, abd- soft, nt/nd, nabs, decreased ROM to RLE (pt states chronic 2/2 recent femur fx), no LE swelling/tenderness, lethargic but arsousable, A&O x 3, cn 2-12 intact, no focal motor/sensory deficits.   -check labs, cxr, head ct 87-year-old female sent from [facility], for low O2 sat.  Patient with h/o CAITLYN, DM, HTN, HLD, a flutter, R femur fx a few months ago, sent to ER from [facility] with 1 day h/o lethargy, weakness, tested + for COVID-19 today.  Patient was reaching for call bell, slipped and fell onto ground.  Found by staff on floor.  Patient denies any injuries from fall, no pain.  Denies any HA, neck pain.  No CP/SOB.  No abdominal pain.  No N/V/D.  VS at [facility] /97, O2 sat 92 percent, so sent to ER for eval.  PE - elderly female, NAD, nc/at, eomi, perrl, op - clear, mm dry, neck supple, cta b/l, no w/r/r, rrr, abd- soft, nt/nd, nabs, decreased ROM to RLE (pt states chronic 2/2 recent femur fx), no LE swelling/tenderness, lethargic but arsousable, A&O x 3, cn 2-12 intact, no focal motor/sensory deficits.   -check labs, cxr, head ct

## 2023-07-04 NOTE — ED PROVIDER NOTE - NSICDXPASTMEDICALHX_GEN_ALL_CORE_FT
PAST MEDICAL HISTORY:  Atrial flutter PER CARDIAC NOTE    Back pain pain management injections dr rodriguez    CKD (chronic kidney disease) stage 3A   PT DENIES    DM (diabetes mellitus)     History of pulmonary hypertension     HLD (hyperlipidemia) PT DENIES    [place] (hard of hearing)     HTN (hypertension)     Hypothyroidism last blood test ~ 3months ago Spet 2022   no changes in meds    Nodule of right lung s/p resection    Occasional tremors X 10 + YEARS    Severe pulmonary hypertension

## 2023-07-04 NOTE — ED PROVIDER NOTE - CLINICAL SUMMARY MEDICAL DECISION MAKING FREE TEXT BOX
87-year-old female from [facility], PMHx as noted, in ER with c/o generalized weakness, hypoxia, tested positive for COVID-19 this a.m.  In ER, O2 sat ~ 92% on RA, no increased WOB.  Labs reviewed: WBC 6, Hgb 11, CMP with mildly elevated LFTs.  Mood troponin negative, BNP 8670.  CXR, pelvis x-ray negative.  CT head/C-spine negative for acute traumatic injury.  Patient given steroids.  Admitted to medicine for continued care for COVID-related symptoms.

## 2023-07-04 NOTE — ED ADULT TRIAGE NOTE - O2 FLOW (L/MIN)
Next appt 5-6-19  Last appt 4-11-19    Refill request for/Last refilled info;  gabapentin (NEURONTIN) 300 MG capsule 90 capsule 1 2/7/2019     Sig: TAKE 1 CAPSULE BY MOUTH THREE TIMES DAILY      Refill to be completed per standing protocol      
4

## 2023-07-04 NOTE — ED ADULT NURSE NOTE - CHIEF COMPLAINT QUOTE
VIPIN from [facility] for abnormal vitals. +COVID. No home O2, placed on O2 by EMS for low oxygen. Despite NH paperwork no recent fall.

## 2023-07-04 NOTE — ED ADULT NURSE NOTE - OBJECTIVE STATEMENT
VIPIN from [facility] for abnormal vitals. +COVID. No home O2, placed on O2 by EMS for low oxygen. As per pt she had fall three weeks ago.

## 2023-07-04 NOTE — ED ADULT TRIAGE NOTE - INTERNATIONAL TRAVEL
[facility] [address]  [address] [address]  Dept Phone: [phone]                                                                                                                                                        Cecil Younger  [address] [facility] [address]    YOB: 1942      Iveth 3, 2018      Adult Cardiac Pre-Operative Assessment     To Whom It May Concern:    Mr.Terry JAY Younger is under my care for management of his cardiovascular condition.      Cardiovascular Risk Assessment for Surgery:    Patient approved to hold clopidogrel (Plavix) for 5 days prior to Endoscopic US with fine needle aspiration/biopsy with Dr. Maik Larios.  Patient should resume medication immediately afterwards, if approved by GI physician.          Sincerely,        Electronically signed by Osmany Lawrence MD      
No

## 2023-07-04 NOTE — H&P ADULT - ASSESSMENT
87-year-old female w/ Severe PHTN, CKD2, DM, HTN, DL, hypothyroidism, aflutter, invasive ductal carcinoma s/p lumpectomy, endometrial cancer s/p omentectomy s/p pelvic wash, left adrenal nodule presenting for hypoxia requiring oxygen at NH, malaise. She slipped and fell as well. Trauma w/u -ve (XR Pelvis, CT H&N)    # COVID?  # Suspected metabolic encephalopathy  - RVP  CXR unofficial clear lungs  inflammatory markers  - MRSA swab  - s/p dexa 10 in ED. c/w Dexa 10 x 9 more days if covid +ve  - c/w Eliquis  - B12, folate, TSH, syphilis, Ammonia, cortisol    # CKD2  # DM  # HTN  # DL  # aflutter  - avoid nephrotoxic drugs  - SSI, Lantus (decrease dose by 20%)  - f/u a1c & lipid profile  - c/w Eliquis, decrease to 2.5 bid (age & weight)  - c/w metoprolol, hydralazine  - consider nifedipine (home med?) if HTN)    # hypothyroidism  - c/w Synthroid    # Severe PHTN  # Suspect HF Exacerbation  Pro-BNP 8.7k  Trop -ve  - TTE  - Lasix 20 IV QD  - fluid restriction  - accurate I/O  - Daily Weight    # Mildly elevated AST/ALT  - f/u iron, TIBC, ferritin  - f/u hepatitis w/u  - f/u RUQ US    # left adrenal nodule  - f/u cortisol in am  - outpatient MRI if not previously done    # Back pain (follows Dr Ahmadi)  # Mild-Mod TR  # Mild AS  # invasive ductal carcinoma s/p lumpectomy  # endometrial cancer s/p omentectomy s/p pelvic wash  # right lung nodule resection  # TAHBSO  - outpatient f/u  - c/w anastrazole    # Diet NPO pending improved Mental status 87-year-old female w/ Severe PHTN, CKD2, DM, HTN, DL, hypothyroidism, aflutter, invasive ductal carcinoma s/p lumpectomy, endometrial cancer s/p omentectomy s/p pelvic wash, left adrenal nodule presenting for hypoxia requiring oxygen at NH, malaise. She slipped and fell as well. Trauma w/u -ve (XR Pelvis, CT H&N).    # COVID?  # Suspected metabolic encephalopathy  - RVP  CXR unofficial clear lungs  inflammatory markers  - MRSA swab  - s/p dexa 10 in ED. c/w Dexa 10 x 9 more days if covid +ve  - c/w Eliquis  - B12, folate, TSH, syphilis, Ammonia, cortisol    # CKD2  # DM  # HTN  # DL  # aflutter  - avoid nephrotoxic drugs  - SSI, Lantus (decrease dose by 20%)  - f/u a1c & lipid profile  - c/w Eliquis, decrease to 2.5 bid (age & weight)  - c/w metoprolol, hydralazine  - consider nifedipine (home med?) if HTN)    # hypothyroidism  - c/w Synthroid    # Severe PHTN  # Suspect HF Exacerbation  Pro-BNP 8.7k  Trop -ve  - TTE  - Lasix 20 IV QD  - fluid restriction  - accurate I/O  - Daily Weight    # Mildly elevated AST/ALT  - f/u iron, TIBC, ferritin  - f/u hepatitis w/u  - f/u RUQ US    # left adrenal nodule  - f/u cortisol in am  - outpatient MRI if not previously done    # Back pain (follows Dr Ahmadi)  # Mild-Mod TR  # Mild AS  # invasive ductal carcinoma s/p lumpectomy  # endometrial cancer s/p omentectomy s/p pelvic wash  # right lung nodule resection  # TAHBSO  - outpatient f/u  - c/w anastrazole    # Diet NPO pending improved Mental status

## 2023-07-04 NOTE — ED PROVIDER NOTE - OBJECTIVE STATEMENT
87y F pmh CKD, DM, HTN, HLD, Hypothyroid presents for eval of hypoxia. Pt was recently diagnosed with COVID and found to be hypoxic on RA at NH this morning, improved with 4L NC, no aggravating factors. Pt endorses generalized body aches. Denies fever, ha, cp, cough, abd pain, weakness, numbness, dysuria, hematuria, leg swelling

## 2023-07-04 NOTE — H&P ADULT - HISTORY OF PRESENT ILLNESS
87-year-old female w/ Severe PHTN, CKD2, DM, HTN, DL, hypothyroidism, aflutter, invasive ductal carcinoma s/p lumpectomy, endometrial cancer s/p omentectomy s/p pelvic wash, left adrenal nodule presenting for hypoxia requiring oxygen at NH, malaise. She slipped and fell as well. Trauma w/u -ve (XR Pelvis, CT H&N)    Pro-BNP 8.7k  Trop -ve  Mildly elevated AST/ALT    Patient is on 2 L NC, could not provide hx. NH papers not in chart    Got med rec from [facility] pharmacy. Certain medications are unclear if continued or not.  Called david Pond (emergency contact), didn't     PMHx: Back pain (follows Dr Ahmadi), Mild-Mod TR, Mild AS  PSHx: right lung nodule resection, TAHBSO

## 2023-07-05 LAB
A1C WITH ESTIMATED AVERAGE GLUCOSE RESULT: 8.4 % — HIGH (ref 4–5.6)
ALBUMIN SERPL ELPH-MCNC: 3.4 G/DL — LOW (ref 3.5–5.2)
ALP SERPL-CCNC: 106 U/L — SIGNIFICANT CHANGE UP (ref 30–115)
ALT FLD-CCNC: 49 U/L — HIGH (ref 0–41)
AMMONIA BLD-MCNC: 12 UMOL/L — SIGNIFICANT CHANGE UP (ref 11–55)
ANION GAP SERPL CALC-SCNC: 13 MMOL/L — SIGNIFICANT CHANGE UP (ref 7–14)
ANION GAP SERPL CALC-SCNC: 13 MMOL/L — SIGNIFICANT CHANGE UP (ref 7–14)
ANION GAP SERPL CALC-SCNC: 14 MMOL/L — SIGNIFICANT CHANGE UP (ref 7–14)
AST SERPL-CCNC: 47 U/L — HIGH (ref 0–41)
BASOPHILS # BLD AUTO: 0.02 K/UL — SIGNIFICANT CHANGE UP (ref 0–0.2)
BASOPHILS NFR BLD AUTO: 0.4 % — SIGNIFICANT CHANGE UP (ref 0–1)
BILIRUB SERPL-MCNC: 0.8 MG/DL — SIGNIFICANT CHANGE UP (ref 0.2–1.2)
BUN SERPL-MCNC: 33 MG/DL — HIGH (ref 10–20)
BUN SERPL-MCNC: 34 MG/DL — HIGH (ref 10–20)
BUN SERPL-MCNC: 38 MG/DL — HIGH (ref 10–20)
CALCIUM SERPL-MCNC: 8.7 MG/DL — SIGNIFICANT CHANGE UP (ref 8.4–10.5)
CALCIUM SERPL-MCNC: 8.8 MG/DL — SIGNIFICANT CHANGE UP (ref 8.4–10.5)
CALCIUM SERPL-MCNC: 9.2 MG/DL — SIGNIFICANT CHANGE UP (ref 8.4–10.4)
CHLORIDE SERPL-SCNC: 102 MMOL/L — SIGNIFICANT CHANGE UP (ref 98–110)
CHLORIDE SERPL-SCNC: 98 MMOL/L — SIGNIFICANT CHANGE UP (ref 98–110)
CHLORIDE SERPL-SCNC: 99 MMOL/L — SIGNIFICANT CHANGE UP (ref 98–110)
CHOLEST SERPL-MCNC: 163 MG/DL — SIGNIFICANT CHANGE UP
CO2 SERPL-SCNC: 22 MMOL/L — SIGNIFICANT CHANGE UP (ref 17–32)
CO2 SERPL-SCNC: 22 MMOL/L — SIGNIFICANT CHANGE UP (ref 17–32)
CO2 SERPL-SCNC: 26 MMOL/L — SIGNIFICANT CHANGE UP (ref 17–32)
CORTIS AM PEAK SERPL-MCNC: 3 UG/DL — LOW (ref 6–18.4)
CREAT SERPL-MCNC: 1 MG/DL — SIGNIFICANT CHANGE UP (ref 0.7–1.5)
CREAT SERPL-MCNC: 1.1 MG/DL — SIGNIFICANT CHANGE UP (ref 0.7–1.5)
CREAT SERPL-MCNC: 1.1 MG/DL — SIGNIFICANT CHANGE UP (ref 0.7–1.5)
CRP SERPL-MCNC: 36.4 MG/L — HIGH
D DIMER BLD IA.RAPID-MCNC: 762 NG/ML DDU — HIGH
EGFR: 49 ML/MIN/1.73M2 — LOW
EGFR: 49 ML/MIN/1.73M2 — LOW
EGFR: 55 ML/MIN/1.73M2 — LOW
EOSINOPHIL # BLD AUTO: 0 K/UL — SIGNIFICANT CHANGE UP (ref 0–0.7)
EOSINOPHIL NFR BLD AUTO: 0 % — SIGNIFICANT CHANGE UP (ref 0–8)
ERYTHROCYTE [SEDIMENTATION RATE] IN BLOOD: 40 MM/HR — HIGH (ref 0–20)
ESTIMATED AVERAGE GLUCOSE: 194 MG/DL — HIGH (ref 68–114)
FERRITIN SERPL-MCNC: 147 NG/ML — SIGNIFICANT CHANGE UP (ref 13–330)
FOLATE SERPL-MCNC: >20 NG/ML — SIGNIFICANT CHANGE UP
GLUCOSE BLDC GLUCOMTR-MCNC: 117 MG/DL — HIGH (ref 70–99)
GLUCOSE BLDC GLUCOMTR-MCNC: 170 MG/DL — HIGH (ref 70–99)
GLUCOSE BLDC GLUCOMTR-MCNC: 211 MG/DL — HIGH (ref 70–99)
GLUCOSE BLDC GLUCOMTR-MCNC: 217 MG/DL — HIGH (ref 70–99)
GLUCOSE BLDC GLUCOMTR-MCNC: 246 MG/DL — HIGH (ref 70–99)
GLUCOSE BLDC GLUCOMTR-MCNC: 290 MG/DL — HIGH (ref 70–99)
GLUCOSE BLDC GLUCOMTR-MCNC: 40 MG/DL — CRITICAL LOW (ref 70–99)
GLUCOSE BLDC GLUCOMTR-MCNC: 432 MG/DL — HIGH (ref 70–99)
GLUCOSE BLDC GLUCOMTR-MCNC: 48 MG/DL — CRITICAL LOW (ref 70–99)
GLUCOSE SERPL-MCNC: 250 MG/DL — HIGH (ref 70–99)
GLUCOSE SERPL-MCNC: 277 MG/DL — HIGH (ref 70–99)
GLUCOSE SERPL-MCNC: 73 MG/DL — SIGNIFICANT CHANGE UP (ref 70–99)
HAV IGM SER-ACNC: SIGNIFICANT CHANGE UP
HBV CORE AB SER-ACNC: SIGNIFICANT CHANGE UP
HBV CORE IGM SER-ACNC: SIGNIFICANT CHANGE UP
HBV SURFACE AB SER-ACNC: SIGNIFICANT CHANGE UP
HBV SURFACE AG SER-ACNC: SIGNIFICANT CHANGE UP
HCT VFR BLD CALC: 35.8 % — LOW (ref 37–47)
HCV AB S/CO SERPL IA: 0.04 COI — SIGNIFICANT CHANGE UP
HCV AB S/CO SERPL IA: 0.19 S/CO — SIGNIFICANT CHANGE UP (ref 0–0.99)
HCV AB SERPL-IMP: SIGNIFICANT CHANGE UP
HCV AB SERPL-IMP: SIGNIFICANT CHANGE UP
HDLC SERPL-MCNC: 57 MG/DL — SIGNIFICANT CHANGE UP
HGB BLD-MCNC: 11.6 G/DL — LOW (ref 12–16)
IMM GRANULOCYTES NFR BLD AUTO: 0.2 % — SIGNIFICANT CHANGE UP (ref 0.1–0.3)
IRON SATN MFR SERPL: 22 UG/DL — LOW (ref 35–150)
IRON SATN MFR SERPL: 8 % — LOW (ref 15–50)
LDH SERPL L TO P-CCNC: 185 — SIGNIFICANT CHANGE UP (ref 50–242)
LIPID PNL WITH DIRECT LDL SERPL: 98 MG/DL — SIGNIFICANT CHANGE UP
LYMPHOCYTES # BLD AUTO: 0.51 K/UL — LOW (ref 1.2–3.4)
LYMPHOCYTES # BLD AUTO: 11.4 % — LOW (ref 20.5–51.1)
MAGNESIUM SERPL-MCNC: 1.8 MG/DL — SIGNIFICANT CHANGE UP (ref 1.8–2.4)
MAGNESIUM SERPL-MCNC: 1.9 MG/DL — SIGNIFICANT CHANGE UP (ref 1.8–2.4)
MCHC RBC-ENTMCNC: 28.6 PG — SIGNIFICANT CHANGE UP (ref 27–31)
MCHC RBC-ENTMCNC: 32.4 G/DL — SIGNIFICANT CHANGE UP (ref 32–37)
MCV RBC AUTO: 88.2 FL — SIGNIFICANT CHANGE UP (ref 81–99)
MONOCYTES # BLD AUTO: 0.71 K/UL — HIGH (ref 0.1–0.6)
MONOCYTES NFR BLD AUTO: 15.8 % — HIGH (ref 1.7–9.3)
NEUTROPHILS # BLD AUTO: 3.24 K/UL — SIGNIFICANT CHANGE UP (ref 1.4–6.5)
NEUTROPHILS NFR BLD AUTO: 72.2 % — SIGNIFICANT CHANGE UP (ref 42.2–75.2)
NON HDL CHOLESTEROL: 106 MG/DL — SIGNIFICANT CHANGE UP
NRBC # BLD: 0 /100 WBCS — SIGNIFICANT CHANGE UP (ref 0–0)
PHOSPHATE SERPL-MCNC: 4.8 MG/DL — SIGNIFICANT CHANGE UP (ref 2.1–4.9)
PLATELET # BLD AUTO: 205 K/UL — SIGNIFICANT CHANGE UP (ref 130–400)
PMV BLD: 11.6 FL — HIGH (ref 7.4–10.4)
POTASSIUM SERPL-MCNC: 3.9 MMOL/L — SIGNIFICANT CHANGE UP (ref 3.5–5)
POTASSIUM SERPL-MCNC: 4.1 MMOL/L — SIGNIFICANT CHANGE UP (ref 3.5–5)
POTASSIUM SERPL-MCNC: 4.1 MMOL/L — SIGNIFICANT CHANGE UP (ref 3.5–5)
POTASSIUM SERPL-SCNC: 3.9 MMOL/L — SIGNIFICANT CHANGE UP (ref 3.5–5)
POTASSIUM SERPL-SCNC: 4.1 MMOL/L — SIGNIFICANT CHANGE UP (ref 3.5–5)
POTASSIUM SERPL-SCNC: 4.1 MMOL/L — SIGNIFICANT CHANGE UP (ref 3.5–5)
PROCALCITONIN SERPL-MCNC: 0.1 NG/ML — SIGNIFICANT CHANGE UP (ref 0.02–0.1)
PROT SERPL-MCNC: 6.5 G/DL — SIGNIFICANT CHANGE UP (ref 6–8)
RBC # BLD: 4.06 M/UL — LOW (ref 4.2–5.4)
RBC # FLD: 14.6 % — HIGH (ref 11.5–14.5)
SARS-COV-2 RNA SPEC QL NAA+PROBE: DETECTED
SODIUM SERPL-SCNC: 134 MMOL/L — LOW (ref 135–146)
SODIUM SERPL-SCNC: 137 MMOL/L — SIGNIFICANT CHANGE UP (ref 135–146)
SODIUM SERPL-SCNC: 138 MMOL/L — SIGNIFICANT CHANGE UP (ref 135–146)
T PALLIDUM AB TITR SER: NEGATIVE — SIGNIFICANT CHANGE UP
T4 FREE+ TSH PNL SERPL: 1.85 UIU/ML — SIGNIFICANT CHANGE UP (ref 0.27–4.2)
TIBC SERPL-MCNC: 274 UG/DL — SIGNIFICANT CHANGE UP (ref 220–430)
TRIGL SERPL-MCNC: 42 MG/DL — SIGNIFICANT CHANGE UP
UIBC SERPL-MCNC: 252 UG/DL — SIGNIFICANT CHANGE UP (ref 110–370)
VIT B12 SERPL-MCNC: 864 PG/ML — SIGNIFICANT CHANGE UP (ref 232–1245)
WBC # BLD: 4.49 K/UL — LOW (ref 4.8–10.8)
WBC # FLD AUTO: 4.49 K/UL — LOW (ref 4.8–10.8)

## 2023-07-05 PROCEDURE — 99233 SBSQ HOSP IP/OBS HIGH 50: CPT

## 2023-07-05 RX ORDER — INSULIN GLARGINE 100 [IU]/ML
15 INJECTION, SOLUTION SUBCUTANEOUS AT BEDTIME
Refills: 0 | Status: DISCONTINUED | OUTPATIENT
Start: 2023-07-05 | End: 2023-07-06

## 2023-07-05 RX ORDER — REMDESIVIR 5 MG/ML
100 INJECTION INTRAVENOUS EVERY 24 HOURS
Refills: 0 | Status: COMPLETED | OUTPATIENT
Start: 2023-07-06 | End: 2023-07-07

## 2023-07-05 RX ORDER — DEXTROSE 50 % IN WATER 50 %
25 SYRINGE (ML) INTRAVENOUS ONCE
Refills: 0 | Status: COMPLETED | OUTPATIENT
Start: 2023-07-05 | End: 2023-07-05

## 2023-07-05 RX ORDER — INSULIN LISPRO 100/ML
4 VIAL (ML) SUBCUTANEOUS
Refills: 0 | Status: DISCONTINUED | OUTPATIENT
Start: 2023-07-05 | End: 2023-07-06

## 2023-07-05 RX ORDER — INSULIN LISPRO 100/ML
VIAL (ML) SUBCUTANEOUS
Refills: 0 | Status: DISCONTINUED | OUTPATIENT
Start: 2023-07-05 | End: 2023-07-11

## 2023-07-05 RX ORDER — NIFEDIPINE 30 MG
60 TABLET, EXTENDED RELEASE 24 HR ORAL DAILY
Refills: 0 | Status: DISCONTINUED | OUTPATIENT
Start: 2023-07-05 | End: 2023-07-11

## 2023-07-05 RX ORDER — INSULIN LISPRO 100/ML
10 VIAL (ML) SUBCUTANEOUS ONCE
Refills: 0 | Status: COMPLETED | OUTPATIENT
Start: 2023-07-05 | End: 2023-07-05

## 2023-07-05 RX ORDER — REMDESIVIR 5 MG/ML
INJECTION INTRAVENOUS
Refills: 0 | Status: COMPLETED | OUTPATIENT
Start: 2023-07-05 | End: 2023-07-07

## 2023-07-05 RX ORDER — REMDESIVIR 5 MG/ML
200 INJECTION INTRAVENOUS EVERY 24 HOURS
Refills: 0 | Status: COMPLETED | OUTPATIENT
Start: 2023-07-05 | End: 2023-07-05

## 2023-07-05 RX ADMIN — Medication 100 MILLIGRAM(S): at 06:38

## 2023-07-05 RX ADMIN — Medication 100 MILLIGRAM(S): at 18:46

## 2023-07-05 RX ADMIN — APIXABAN 2.5 MILLIGRAM(S): 2.5 TABLET, FILM COATED ORAL at 18:47

## 2023-07-05 RX ADMIN — Medication 6: at 15:03

## 2023-07-05 RX ADMIN — Medication 100 MICROGRAM(S): at 06:38

## 2023-07-05 RX ADMIN — Medication 4: at 18:54

## 2023-07-05 RX ADMIN — Medication 25 GRAM(S): at 22:41

## 2023-07-05 RX ADMIN — REMDESIVIR 200 MILLIGRAM(S): 5 INJECTION INTRAVENOUS at 18:44

## 2023-07-05 RX ADMIN — ANASTROZOLE 1 MILLIGRAM(S): 1 TABLET ORAL at 14:54

## 2023-07-05 RX ADMIN — Medication 4 UNIT(S): at 18:53

## 2023-07-05 RX ADMIN — APIXABAN 2.5 MILLIGRAM(S): 2.5 TABLET, FILM COATED ORAL at 06:38

## 2023-07-05 RX ADMIN — Medication 25 GRAM(S): at 20:59

## 2023-07-05 RX ADMIN — Medication 2: at 09:34

## 2023-07-05 RX ADMIN — Medication 10 UNIT(S): at 18:53

## 2023-07-05 NOTE — PROGRESS NOTE ADULT - SUBJECTIVE AND OBJECTIVE BOX
Pt seen and examined. Pt denies any SOB on room air    T(F): , Max: 97.6 (07-05-23 @ 06:26)  HR: 96 (07-05-23 @ 21:18) (52 - 96)  BP: 127/71 (07-05-23 @ 21:18)  RR: 17 (07-05-23 @ 21:18)  SpO2: 99% (07-05-23 @ 21:18)  General: No apparent distress  Cardiovascular: S1, S2  Gastrointestinal: Soft, Non-tender, Non-distended  Respiratory: Good air entry bilaterally  Musculoskeletal: Moves all extremities  Lymphatic: No edema  Neurologic: No gross motor deficit  Dermatologic: Skin dry                          11.6   4.49  )-----------( 205      ( 05 Jul 2023 06:55 )             35.8     07-05    137  |  102  |  38<H>  ----------------------------<  73  3.9   |  22  |  1.1    Ca    8.8      05 Jul 2023 20:40  Phos  4.8     07-05  Mg     1.8     07-05    TPro  6.5  /  Alb  3.4<L>  /  TBili  0.8  /  DBili  x   /  AST  47<H>  /  ALT  49<H>  /  AlkPhos  106  07-05

## 2023-07-05 NOTE — PROGRESS NOTE ADULT - ASSESSMENT
87-year-old female w/ Severe PHTN, CKD2, DM, HTN, DL, hypothyroidism, aflutter, invasive ductal carcinoma s/p lumpectomy, endometrial cancer s/p omentectomy s/p pelvic wash, left adrenal nodule presenting for hypoxia requiring oxygen at NH, malaise. She slipped and fell as well.     # COVID  - rdv x 3 days  - resp status unlabored on room air now      # Suspected metabolic encephalopathy  -resolved?    # CKD2  # DM  # HTN  # DL  # aflutter  - c/w Eliquis 2.5  - c/w metoprolol, hydralazine      # hypothyroidism  - c/w Synthroid    # Severe PHTN  # ? HF Exacerbation  - Lasix 20 IV QD  - on room air now    # elevated AST/ALT  nearly normalized    # left adrenal nodule  - outpatient MRI    # invasive ductal carcinoma s/p lumpectomy  # endometrial cancer s/p omentectomy s/p pelvic wash  # right lung nodule resection  # TAHBSO  - outpatient f/u  - c/w anastrazole    getting RDV for COVID

## 2023-07-06 LAB
ANION GAP SERPL CALC-SCNC: 11 MMOL/L — SIGNIFICANT CHANGE UP (ref 7–14)
BUN SERPL-MCNC: 35 MG/DL — HIGH (ref 10–20)
CALCIUM SERPL-MCNC: 8.8 MG/DL — SIGNIFICANT CHANGE UP (ref 8.4–10.5)
CHLORIDE SERPL-SCNC: 99 MMOL/L — SIGNIFICANT CHANGE UP (ref 98–110)
CO2 SERPL-SCNC: 25 MMOL/L — SIGNIFICANT CHANGE UP (ref 17–32)
CREAT SERPL-MCNC: 0.9 MG/DL — SIGNIFICANT CHANGE UP (ref 0.7–1.5)
EGFR: 63 ML/MIN/1.73M2 — SIGNIFICANT CHANGE UP
GLUCOSE BLDC GLUCOMTR-MCNC: 197 MG/DL — HIGH (ref 70–99)
GLUCOSE BLDC GLUCOMTR-MCNC: 297 MG/DL — HIGH (ref 70–99)
GLUCOSE BLDC GLUCOMTR-MCNC: 74 MG/DL — SIGNIFICANT CHANGE UP (ref 70–99)
GLUCOSE BLDC GLUCOMTR-MCNC: 79 MG/DL — SIGNIFICANT CHANGE UP (ref 70–99)
GLUCOSE BLDC GLUCOMTR-MCNC: 84 MG/DL — SIGNIFICANT CHANGE UP (ref 70–99)
GLUCOSE BLDC GLUCOMTR-MCNC: 96 MG/DL — SIGNIFICANT CHANGE UP (ref 70–99)
GLUCOSE BLDC GLUCOMTR-MCNC: 98 MG/DL — SIGNIFICANT CHANGE UP (ref 70–99)
GLUCOSE SERPL-MCNC: 70 MG/DL — SIGNIFICANT CHANGE UP (ref 70–99)
POTASSIUM SERPL-MCNC: 3.8 MMOL/L — SIGNIFICANT CHANGE UP (ref 3.5–5)
POTASSIUM SERPL-SCNC: 3.8 MMOL/L — SIGNIFICANT CHANGE UP (ref 3.5–5)
RAPID RVP RESULT: DETECTED
SARS-COV-2 RNA SPEC QL NAA+PROBE: DETECTED
SODIUM SERPL-SCNC: 135 MMOL/L — SIGNIFICANT CHANGE UP (ref 135–146)

## 2023-07-06 PROCEDURE — 99232 SBSQ HOSP IP/OBS MODERATE 35: CPT

## 2023-07-06 PROCEDURE — 76705 ECHO EXAM OF ABDOMEN: CPT | Mod: 26

## 2023-07-06 RX ORDER — INSULIN GLARGINE 100 [IU]/ML
12 INJECTION, SOLUTION SUBCUTANEOUS AT BEDTIME
Refills: 0 | Status: DISCONTINUED | OUTPATIENT
Start: 2023-07-06 | End: 2023-07-07

## 2023-07-06 RX ADMIN — REMDESIVIR 200 MILLIGRAM(S): 5 INJECTION INTRAVENOUS at 17:48

## 2023-07-06 RX ADMIN — Medication 60 MILLIGRAM(S): at 08:01

## 2023-07-06 RX ADMIN — APIXABAN 2.5 MILLIGRAM(S): 2.5 TABLET, FILM COATED ORAL at 17:39

## 2023-07-06 RX ADMIN — APIXABAN 2.5 MILLIGRAM(S): 2.5 TABLET, FILM COATED ORAL at 07:00

## 2023-07-06 RX ADMIN — Medication 20 MILLIGRAM(S): at 07:01

## 2023-07-06 RX ADMIN — Medication 100 MICROGRAM(S): at 06:59

## 2023-07-06 RX ADMIN — Medication 100 MILLIGRAM(S): at 07:00

## 2023-07-06 RX ADMIN — Medication 100 MILLIGRAM(S): at 17:39

## 2023-07-06 RX ADMIN — ANASTROZOLE 1 MILLIGRAM(S): 1 TABLET ORAL at 11:50

## 2023-07-06 RX ADMIN — Medication 6: at 17:47

## 2023-07-06 NOTE — PROGRESS NOTE ADULT - ASSESSMENT
87-year-old female w/ Severe PHTN, CKD2, DM, HTN, DL, hypothyroidism, aflutter, invasive ductal carcinoma s/p lumpectomy, endometrial cancer s/p omentectomy s/p pelvic wash, left adrenal nodule presenting for hypoxia requiring oxygen at NH, malaise. She slipped and fell as well.     # Acute Hypoxic Respiratory Failure likely due to COVID  - Improving clinially, off O2, currently on RA  - rdv x 3 days as per ID     # Suspected metabolic encephalopathy  -resolving     # CKD2  # DM  # HTN  # DL  # aflutter  - c/w Eliquis 2.5  - c/w metoprolol, hydralazine      # hypothyroidism  - c/w Synthroid    # Severe PHTN  # ? HF Exacerbation  - Lasix 20 IV QD  - on room air now    # elevated AST/ALT >> Likely due to COVID  nearly normalized    # left adrenal nodule  - outpatient MRI    # invasive ductal carcinoma s/p lumpectomy  # endometrial cancer s/p omentectomy s/p pelvic wash  # right lung nodule resection  # TAHBSO  - outpatient f/u  - c/w anastrazole    Pending: RDV  Dispo: [facility]  Plan d/w the son at bedside

## 2023-07-06 NOTE — CONSULT NOTE ADULT - ASSESSMENT
ASSESSMENT  87y F admitted with Infection due to severe acute respiratory syndrome coronavirus 2 (SARS-CoV-2)      HTN (hypertension)    DM (diabetes mellitus)    Hypothyroidism    HLD (hyperlipidemia)    CKD (chronic kidney disease)    Occasional tremors    Nodule of right lung    Back pain    Atrial flutter    History of pulmonary hypertension    Severe pulmonary hypertension    [place] (hard of hearing)        PROBLEMS  Pt with multiple comorbidities admitted for hypoxia and fall, found to have covid +, she has mild to moderate disease, with currently no O2 requirement    RECOMMENDATIONS  - Continue Remdesivir for total of 3 doses (200mg on D1 then 100mg on D2 and D3)  - Keep off steroids  - Monitor inflammatory markers and vitals       ASSESSMENT  87y F admitted with Infection due to severe acute respiratory syndrome coronavirus 2 (SARS-CoV-2)      HTN (hypertension)    DM (diabetes mellitus)    Hypothyroidism    HLD (hyperlipidemia)    CKD (chronic kidney disease)    Occasional tremors    Nodule of right lung    Back pain    Atrial flutter    History of pulmonary hypertension    Severe pulmonary hypertension    [place] (hard of hearing)        PROBLEMS  Pt with multiple comorbidities admitted for hypoxia and fall, found to have covid +, she has mild to moderate disease, with currently no O2 requirement  CXR no GGO  S/p vaccination and one booster.   Pt is in the early viral replicative phase based on the timeline/onset of symptoms.   Presntly no pulmonary symptoms    RECOMMENDATIONS  - Continue Remdesivir for total of 3 doses (200mg on D1 then 100mg on D2 and D3)  - Keep off steroids  - Monitor inflammatory markers and vitals    Please do not hesitate to recall ID if any questions arise either through iwi [phone] or through [employer]

## 2023-07-06 NOTE — PROGRESS NOTE ADULT - SUBJECTIVE AND OBJECTIVE BOX
NCIOLLE SALCEDOISE  87y  Female      Patient is a 87y old  Female who presents with a chief complaint of covid       INTERVAL HPI/OVERNIGHT EVENTS:      ******************************* REVIEW OF SYSTEMS:**********************************************    All other review of systems negative    *********************** VITALS ******************************************    T(F): 97 (07-06-23 @ 16:39)  HR: 68 (07-06-23 @ 16:39) (56 - 96)  BP: 132/67 (07-06-23 @ 16:39) (126/80 - 196/83)  RR: 18 (07-06-23 @ 16:39) (16 - 18)  SpO2: 97% (07-06-23 @ 16:39) (96% - 99%)    07-05-23 @ 07:01  -  07-06-23 @ 07:00  --------------------------------------------------------  IN: 0 mL / OUT: 1300 mL / NET: -1300 mL            07-05-23 @ 07:01  -  07-06-23 @ 07:00  --------------------------------------------------------  IN: 0 mL / OUT: 1300 mL / NET: -1300 mL        ******************************** PHYSICAL EXAM:**************************************************  GENERAL: NAD    PSYCH: no agitation, baseline mentation  HEENT:     NERVOUS SYSTEM:  Alert & Oriented X3,   PULMONARY: MAUREEN, CTA    CARDIOVASCULAR: S1S2 RRR    GI: Soft, NT, ND; BS present.    EXTREMITIES:  2+ Peripheral Pulses, No clubbing, cyanosis, or edema    LYMPH: No lymphadenopathy noted    SKIN: No rashes or lesions      **************************** LABS *******************************************************                          11.6   4.49  )-----------( 205      ( 05 Jul 2023 06:55 )             35.8     07-06    135  |  99  |  35<H>  ----------------------------<  70  3.8   |  25  |  0.9    Ca    8.8      06 Jul 2023 00:31  Phos  4.8     07-05  Mg     1.8     07-05    TPro  6.5  /  Alb  3.4<L>  /  TBili  0.8  /  DBili  x   /  AST  47<H>  /  ALT  49<H>  /  AlkPhos  106  07-05      Urinalysis Basic - ( 06 Jul 2023 00:31 )    Color: x / Appearance: x / SG: x / pH: x  Gluc: 70 mg/dL / Ketone: x  / Bili: x / Urobili: x   Blood: x / Protein: x / Nitrite: x   Leuk Esterase: x / RBC: x / WBC x   Sq Epi: x / Non Sq Epi: x / Bacteria: x        Lactate Trend  07-04 @ 14:35 Lactate:0.8         CAPILLARY BLOOD GLUCOSE      POCT Blood Glucose.: 98 mg/dL (06 Jul 2023 11:29)          **************************Active Medications *******************************************  No Known Allergies      acetaminophen     Tablet .. 650 milliGRAM(s) Oral every 6 hours PRN  aluminum hydroxide/magnesium hydroxide/simethicone Suspension 30 milliLiter(s) Oral every 4 hours PRN  anastrozole 1 milliGRAM(s) Oral daily  apixaban 2.5 milliGRAM(s) Oral every 12 hours  dextrose 5%. 1000 milliLiter(s) IV Continuous <Continuous>  dextrose 5%. 1000 milliLiter(s) IV Continuous <Continuous>  dextrose 50% Injectable 25 Gram(s) IV Push once  dextrose 50% Injectable 12.5 Gram(s) IV Push once  dextrose 50% Injectable 25 Gram(s) IV Push once  dextrose Oral Gel 15 Gram(s) Oral once PRN  furosemide   Injectable 20 milliGRAM(s) IV Push daily  glucagon  Injectable 1 milliGRAM(s) IntraMuscular once  hydrALAZINE 50 milliGRAM(s) Oral four times a day PRN  insulin glargine Injectable (LANTUS) 12 Unit(s) SubCutaneous at bedtime  insulin lispro (ADMELOG) corrective regimen sliding scale   SubCutaneous three times a day before meals  levothyroxine 100 MICROGram(s) Oral daily  melatonin 3 milliGRAM(s) Oral at bedtime PRN  metoprolol tartrate 100 milliGRAM(s) Oral two times a day  NIFEdipine XL 60 milliGRAM(s) Oral daily  ondansetron Injectable 4 milliGRAM(s) IV Push every 8 hours PRN  remdesivir  IVPB   IV Intermittent   remdesivir  IVPB 100 milliGRAM(s) IV Intermittent every 24 hours      ***************************************************  RADIOLOGY & ADDITIONAL TESTS:    Imaging Personally Reviewed:  [ ] YES  [ ] NO    HEALTH ISSUES - PROBLEM Dx:

## 2023-07-06 NOTE — PHYSICAL THERAPY INITIAL EVALUATION ADULT - PERTINENT HX OF CURRENT PROBLEM, REHAB EVAL
Reason for Admission: covid  History of Present Illness:   87-year-old female w/ Severe PHTN, CKD2, DM, HTN, DL, hypothyroidism, aflutter, invasive ductal carcinoma s/p lumpectomy, endometrial cancer s/p omentectomy s/p pelvic wash, left adrenal nodule presenting for hypoxia requiring oxygen at NH, malaise. She slipped and fell as well. Trauma w/u -ve (XR Pelvis, CT H&N)

## 2023-07-06 NOTE — PHYSICAL THERAPY INITIAL EVALUATION ADULT - GAIT DEVIATIONS NOTED, PT EVAL
decreased yary/increased time in double stance/decreased velocity of limb motion/decreased step length/decreased stride length

## 2023-07-06 NOTE — CONSULT NOTE ADULT - SUBJECTIVE AND OBJECTIVE BOX
DENISSELORETTA MCKAY  87y, Female  Allergy: No Known Allergies      CHIEF COMPLAINT: covid (05 Jul 2023 22:17)      HPI:  87-year-old female w/ Severe PHTN, CKD2, DM, HTN, DL, hypothyroidism, aflutter, invasive ductal carcinoma s/p lumpectomy, endometrial cancer s/p omentectomy s/p pelvic wash, left adrenal nodule presenting for hypoxia requiring oxygen at NH, malaise. She slipped and fell as well. Trauma w/u -ve (XR Pelvis, CT H&N)  Hx goes back to 7/1/23 when patient started complaining of sore throat, rhinorrhea, mild productive cough and altered general status.    Pro-BNP 8.7k  Trop -ve  Mildly elevated AST/ALT    Patient is on 2 L NC, could not provide hx. NH papers not in chart    Got med rec from [facility] pharmacy. Certain medications are unclear if continued or not.  Called david Pond (emergency contact), didn't     PMHx: Back pain (follows Dr Rodriguez), Mild-Mod TR, Mild AS  PSHx: right lung nodule resection, TAHBSO (04 Jul 2023 21:07)    FAMILY HISTORY:  Known health problems: none      PAST MEDICAL & SURGICAL HISTORY:  HTN (hypertension)      DM (diabetes mellitus)      Hypothyroidism  last blood test ~ 3months ago Spet 2022   no changes in meds      HLD (hyperlipidemia)  PT DENIES      CKD (chronic kidney disease)  stage 3A   PT DENIES      Occasional tremors  X 10 + YEARS      Nodule of right lung  s/p resection      Back pain  pain management injections dr rodriguez      Atrial flutter  PER CARDIAC NOTE      History of pulmonary hypertension      Severe pulmonary hypertension      [place] (hard of hearing)      History of lung surgery  lung nodule resection      History of hernia surgery  left inquinal      S/P LARA-BSO (total abdominal hysterectomy and bilateral salpingo-oophorectomy)      S/P LARA-BSO      S/P cataract surgery          SOCIAL HISTORY    Substance Use (  ) never used  ( X ) IVDU (  ) Other:  Tobacco Usage:  ( X ) never smoked   (   ) former smoker   (   ) current smoker   Alcohol Usage: (   ) social  (   ) daily use (   ) denies  Sexual History: No active      ROS  General: Denies rigors, nightsweats  HEENT: Denies headache, rhinorrhea, sore throat, eye pain  CV: Denies CP, palpitations  PULM: Denies wheezing, hemoptysis  GI: Denies hematemesis, hematochezia, melena  : Denies discharge, hematuria  MSK: Denies arthralgias, myalgias  SKIN: Denies rash, lesions  NEURO: Denies paresthesias, weakness  PSYCH: Denies depression, anxiety    VITALS:  T(F): 98.1, Max: 98.1 (07-06-23 @ 08:31)  HR: 64  BP: 196/83  RR: 18Vital Signs Last 24 Hrs  T(C): 36.7 (06 Jul 2023 08:31), Max: 36.7 (06 Jul 2023 08:31)  T(F): 98.1 (06 Jul 2023 08:31), Max: 98.1 (06 Jul 2023 08:31)  HR: 64 (06 Jul 2023 08:31) (56 - 96)  BP: 196/83 (06 Jul 2023 08:31) (126/80 - 196/83)  BP(mean): 119 (06 Jul 2023 08:31) (91 - 119)  RR: 18 (06 Jul 2023 08:31) (16 - 18)  SpO2: 96% (06 Jul 2023 08:31) (96% - 99%)    Parameters below as of 06 Jul 2023 08:31  Patient On (Oxygen Delivery Method): room air        PHYSICAL EXAM:  Gen: NAD, resting in bed  HEENT: Normocephalic, atraumatic  Neck: supple, no lymphadenopathy  CV: Regular rate & regular rhythm  Lungs: decreased BS at bases  Abdomen: Soft, BS present  Ext: Warm, well perfused  Neuro: non focal, awake  Skin: no rash, no erythema    TESTS & MEASUREMENTS:                        11.6   4.49  )-----------( 205      ( 05 Jul 2023 06:55 )             35.8     07-06    135  |  99  |  35<H>  ----------------------------<  70  3.8   |  25  |  0.9    Ca    8.8      06 Jul 2023 00:31  Phos  4.8     07-05  Mg     1.8     07-05    TPro  6.5  /  Alb  3.4<L>  /  TBili  0.8  /  DBili  x   /  AST  47<H>  /  ALT  49<H>  /  AlkPhos  106  07-05      LIVER FUNCTIONS - ( 05 Jul 2023 06:55 )  Alb: 3.4 g/dL / Pro: 6.5 g/dL / ALK PHOS: 106 U/L / ALT: 49 U/L / AST: 47 U/L / GGT: x           Urinalysis Basic - ( 06 Jul 2023 00:31 )    Color: x / Appearance: x / SG: x / pH: x  Gluc: 70 mg/dL / Ketone: x  / Bili: x / Urobili: x   Blood: x / Protein: x / Nitrite: x   Leuk Esterase: x / RBC: x / WBC x   Sq Epi: x / Non Sq Epi: x / Bacteria: x        Culture - Blood (collected 07-04-23 @ 15:30)  Source: .Blood Blood-Peripheral  Preliminary Report (07-06-23 @ 01:02):    No growth at 24 hours    Culture - Blood (collected 07-04-23 @ 15:10)  Source: .Blood Blood-Peripheral  Preliminary Report (07-06-23 @ 01:02):    No growth at 24 hours        Lactate, Blood: 0.8 mmol/L (07-04-23 @ 14:35)      INFECTIOUS DISEASES TESTING  Hepatitis B Surface Antigen: Nonreact (07-05-23 @ 06:55)      RADIOLOGY & ADDITIONAL TESTS:  I have personally reviewed the last Chest xray  CXR  Xray Chest 1 View- PORTABLE-Urgent:   ACC: [id number] EXAM:  XR CHEST PORTABLE URGENT 1V   ORDERED BY: LUIS ALBERTO OAKES     PROCEDURE DATE:  07/04/2023          INTERPRETATION:  Clinical History / Reason for exam: Shortness of breath    Comparison : Chest radiograph 11/26/2022.    Technique/Positioning: Single frontal chest x-ray obtained.    Findings:    Support devices: None.    Cardiac/mediastinum/hilum: Unchanged.    Lung parenchyma/Pleura: Within normal limits.    Skeleton/soft tissues: Unchanged.    Impression:    No radiographic evidence of acute cardiopulmonary disease.        --- End of Report ---            MARTINEZ CABRERA MD; Attending Radiologist  This document has been electronically signed. Jul 4 2023 10:39PM (07-04-23 @ 14:56)      CT      CARDIOLOGY TESTING  12 Lead ECG:   Ventricular Rate 63 BPM    Atrial Rate 344 BPM    QRS Duration 82 ms    Q-T Interval 432 ms    QTC Calculation(Bazett) 442 ms    P Axis 169 degrees    R Axis 133 degrees    T Axis 5 degrees    Diagnosis Line Atrial flutter with variable A-V block with premature ventricular or  aberrantly conducted complexes  Abnormal ECG    Confirmed by Fly Ray ([id number]) on 7/5/2023 7:51:45 AM (07-04-23 @ 15:59)      MEDICATIONS  anastrozole 1  apixaban 2.5  dextrose 5%. 1000  dextrose 5%. 1000  dextrose 50% Injectable 25  dextrose 50% Injectable 12.5  dextrose 50% Injectable 25  furosemide   Injectable 20  glucagon  Injectable 1  insulin glargine Injectable (LANTUS) 12  insulin lispro (ADMELOG) corrective regimen sliding scale   levothyroxine 100  metoprolol tartrate 100  NIFEdipine XL 60  remdesivir  IVPB   remdesivir  IVPB 100      ANTIBIOTICS:  Started on remdesivir  IVPB 200 milliGRAM(s) IV once then 100mg Intermittent every 24 hours for 2 additional doses

## 2023-07-06 NOTE — PHYSICAL THERAPY INITIAL EVALUATION ADULT - GENERAL OBSERVATIONS, REHAB EVAL
11:00-11:30 Pt encountered supine in bed, +primafit, + tele, agreeable to PT. Pt performed bed mobility, transfers, and ambulation with assistance. Continue PT as tolerated.

## 2023-07-06 NOTE — PHYSICAL THERAPY INITIAL EVALUATION ADULT - LEVEL OF INDEPENDENCE: STAND/SIT, REHAB EVAL
Fredi Goldman is a 38 y.o. female who presents today for her medical conditions/ complaints as noted below. Fredi Goldman is c/o of Annual Exam        HPI  Pt presents for annual exam and pap smear. Needs order for screening mammogram. DR Lisseth Snyder PCP- draws labs and manages meds. Has one retained ovary from hysterectomy. Still having hot flashes and night sweats, but gained weight from HRT. Has lost over 30 lbs since last year doing keto diet. [id number]  Pap [id number]  Contraception:hyst  Bone density:NA  Colonoscopy: while ago   No LMP recorded (lmp unknown). Patient has had a hysterectomy.   [id number]    Past Medical History:   Diagnosis Date    Anxiety     Burn 1/2013    buttocks    Depression     Retaining fluid     TMJ (dislocation of temporomandibular joint)      Past Surgical History:   Procedure Laterality Date    APPENDECTOMY  11/06/2013    Dr Susanne Mustafa  02/2013    buttocks Dr Shruti Dial Left 12/21/2018    CARPAL TUNNEL RELEASE performed by Luis Oakes MD at [facility]      while ago    HYSTERECTOMY, TOTAL ABDOMINAL (CERVIX REMOVED)  2011    and left ovary rem'd    INCONTINENCE SURGERY       Family History   Problem Relation Age of Onset    Ovarian Cancer Sister         states went through ivf and infertility medications     Social History     Tobacco Use    Smoking status: Every Day     Packs/day: 0.25     Years: 5.00     Pack years: 1.25     Types: Cigarettes    Smokeless tobacco: Never   Substance Use Topics    Alcohol use: No       Current Outpatient Medications   Medication Sig Dispense Refill    HYDROCHLOROTHIAZIDE PO Take by mouth Indications: pt states she takes 10 mg      metoprolol succinate (TOPROL XL) 50 MG extended release tablet 100 mg  0    VORTIoxetine (TRINTELLIX) 10 MG TABS tablet Take 10 mg by mouth daily      tiZANidine (ZANAFLEX) 4 MG tablet       ALPRAZolam (XANAX) 0.5 MG tablet Take 1 mg by mouth 3 times daily. .      progesterone (PROMETRIUM) 100 MG CAPS capsule TAKE ONE CAPSULE BY MOUTH EVERY NIGHT AT BEDTIME (Patient not taking: Reported on 9/27/2022) 90 capsule 3    estrogens, conjugated, (PREMARIN) 0.9 MG tablet TAKE 1 TABLET BY MOUTH EVERY DAY (Patient not taking: Reported on 9/27/2022) 30 tablet 0     No current facility-administered medications for this visit. No Known Allergies  Vitals:    09/27/22 1511   BP: 129/82   Pulse: 92     Body mass index is 32.56 kg/m². Review of Systems   Constitutional:  Positive for diaphoresis. HENT: Negative. Eyes: Negative. Respiratory: Negative. Cardiovascular: Negative. Gastrointestinal: Negative. Negative for constipation and diarrhea. Endocrine: Negative. Genitourinary: Negative. Negative for frequency, menstrual problem and urgency. Musculoskeletal: Negative. Skin: Negative. Allergic/Immunologic: Negative. Neurological: Negative. Hematological: Negative. Psychiatric/Behavioral: Negative. All other systems reviewed and are negative. Physical Exam  Vitals and nursing note reviewed. Constitutional:       Appearance: She is well-developed. HENT:      Head: Normocephalic. Neck:      Thyroid: No thyroid mass or thyromegaly. Cardiovascular:      Rate and Rhythm: Normal rate and regular rhythm. Pulmonary:      Effort: Pulmonary effort is normal.      Breath sounds: Normal breath sounds. Chest:   Breasts:     Right: No inverted nipple, mass, nipple discharge or skin change. Left: No inverted nipple, mass, nipple discharge or skin change. Comments: Moderately fibrocystic bilaterally  Abdominal:      Palpations: Abdomen is soft. There is no mass. Tenderness: There is no abdominal tenderness. Genitourinary:     General: Normal vulva. Vagina: Normal.      Uterus: Absent. Adnexa:         Right: No mass or tenderness. Left: No mass or tenderness. Comments: Pap collected    Musculoskeletal:         General: Normal range of motion. Cervical back: Normal range of motion and neck supple. Skin:     General: Skin is warm and dry. Neurological:      Mental Status: She is alert and oriented to person, place, and time. Psychiatric:         Attention and Perception: Attention normal.         Mood and Affect: Mood normal.         Speech: Speech normal.         Behavior: Behavior normal.         Thought Content: Thought content normal.         Cognition and Memory: Cognition normal.         Judgment: Judgment normal.        Diagnosis Orders   1. Encounter for well woman exam with routine gynecological exam        2. Screening for malignant neoplasm of vagina after total hysterectomy  PAP SMEAR      3. Screening for HPV (human papillomavirus)  Human papillomavirus (HPV) DNA probe thin prep high risk      4. Encounter for screening mammogram for breast cancer  S[facility] DIGITAL SCREEN W OR WO CAD BILATERAL      5. Symptomatic menopausal or female climacteric states            MEDICATIONS:  No orders of the defined types were placed in this encounter. ORDERS:  Orders Placed This Encounter   Procedures    SAN[facility]H DIGITAL SCREEN W OR WO CAD BILATERAL    PAP SMEAR    Human papillomavirus (HPV) DNA probe thin prep high risk       PLAN:  Pap collected  Ordered screening mammogram  Hormones ordered for hot flashes and night sweats    Patient Instructions   Patient Education       Breast Self-Exam: Care Instructions  Your Care Instructions     A breast self-exam is when you check your breasts for lumps or changes. This regular exam helps you learn how your breasts normally look and feel. Most breast problems or changes are not because of cancer. Breast self-exam is not a substitute for a mammogram. Having regular breast exams by your doctor and regular mammograms improve your chances of finding any problems with your breasts.   Some women set a time each month to do a step-by-step breast self-exam. Other women like a less formal system. They might look at their breasts as they brush their teeth, or feel their breasts once in a while in the shower. If you notice a change in your breast, tell your doctor. Follow-up care is a key part of your treatment and safety. Be sure to make and go to all appointments, and call your doctor if you are having problems. It's also a good idea to know your test results and keep a list of the medicines you take. How do you do a breast self-exam?  The best time to examine your breasts is usually one week after your menstrual period begins. Your breasts should not be tender then. If you do not have periods, you might do your exam on a day of the month that is easy to remember. To examine your breasts:  Remove all your clothes above the waist and lie down. When you are lying down, your breast tissue spreads evenly over your chest wall, which makes it easier to feel all your breast tissue. Use the pads--not the fingertips--of the 3 middle fingers of your left hand to check your right breast. Move your fingers slowly in small coin-sized circles that overlap. Use three levels of pressure to feel of all your breast tissue. Use light pressure to feel the tissue close to the skin surface. Use medium pressure to feel a little deeper. Use firm pressure to feel your tissue close to your breastbone and ribs. Use each pressure level to feel your breast tissue before moving on to the next spot. Check your entire breast, moving up and down as if following a strip from the collarbone to the bra line, and from the armpit to the ribs. Repeat until you have covered the entire breast.  Repeat this procedure for your left breast, using the pads of the 3 middle fingers of your right hand. To examine your breasts while in the shower:  Place one arm over your head and lightly soap your breast on that side.   Using the pads of your fingers, gently move your hand over your breast (in the strip pattern described above), feeling carefully for any lumps or changes. Repeat for the other breast.  Have your doctor inspect anything you notice to see if you need further testing. Where can you learn more? Go to [email or web address] org and sign in to your [employer] account. Enter P148 in the [employer] box to learn more about \"Breast Self-Exam: Care Instructions. \"     If you do not have an account, please click on the \"Sign Up Now\" link. Current as of: November 22, 2021               Content Version: 13.4  © 2006-2022 [employer]wise, [employer]. Care instructions adapted under license by [facility] ([facility]). If you have questions about a medical condition or this instruction, always ask your healthcare professional. [address] any warranty or liability for your use of this information. minimum assist (75% patients effort)

## 2023-07-06 NOTE — PHYSICAL THERAPY INITIAL EVALUATION ADULT - ADDITIONAL COMMENTS
Pt lives at [facility], no steps. Pt was independent with functional mobility and using RW PTA.

## 2023-07-07 LAB
ALBUMIN SERPL ELPH-MCNC: 3.3 G/DL — LOW (ref 3.5–5.2)
ALP SERPL-CCNC: 94 U/L — SIGNIFICANT CHANGE UP (ref 30–115)
ALT FLD-CCNC: 35 U/L — SIGNIFICANT CHANGE UP (ref 0–41)
ANION GAP SERPL CALC-SCNC: 11 MMOL/L — SIGNIFICANT CHANGE UP (ref 7–14)
ANISOCYTOSIS BLD QL: SLIGHT — SIGNIFICANT CHANGE UP
AST SERPL-CCNC: 25 U/L — SIGNIFICANT CHANGE UP (ref 0–41)
BASOPHILS # BLD AUTO: 0.06 K/UL — SIGNIFICANT CHANGE UP (ref 0–0.2)
BASOPHILS NFR BLD AUTO: 1.8 % — HIGH (ref 0–1)
BILIRUB SERPL-MCNC: 0.4 MG/DL — SIGNIFICANT CHANGE UP (ref 0.2–1.2)
BUN SERPL-MCNC: 29 MG/DL — HIGH (ref 10–20)
CALCIUM SERPL-MCNC: 8.9 MG/DL — SIGNIFICANT CHANGE UP (ref 8.4–10.4)
CHLORIDE SERPL-SCNC: 98 MMOL/L — SIGNIFICANT CHANGE UP (ref 98–110)
CO2 SERPL-SCNC: 29 MMOL/L — SIGNIFICANT CHANGE UP (ref 17–32)
CREAT SERPL-MCNC: 0.8 MG/DL — SIGNIFICANT CHANGE UP (ref 0.7–1.5)
EGFR: 72 ML/MIN/1.73M2 — SIGNIFICANT CHANGE UP
EOSINOPHIL # BLD AUTO: 0.09 K/UL — SIGNIFICANT CHANGE UP (ref 0–0.7)
EOSINOPHIL NFR BLD AUTO: 2.6 % — SIGNIFICANT CHANGE UP (ref 0–8)
GIANT PLATELETS BLD QL SMEAR: PRESENT — SIGNIFICANT CHANGE UP
GLUCOSE BLDC GLUCOMTR-MCNC: 160 MG/DL — HIGH (ref 70–99)
GLUCOSE BLDC GLUCOMTR-MCNC: 180 MG/DL — HIGH (ref 70–99)
GLUCOSE BLDC GLUCOMTR-MCNC: 193 MG/DL — HIGH (ref 70–99)
GLUCOSE BLDC GLUCOMTR-MCNC: 397 MG/DL — HIGH (ref 70–99)
GLUCOSE BLDC GLUCOMTR-MCNC: 409 MG/DL — HIGH (ref 70–99)
GLUCOSE BLDC GLUCOMTR-MCNC: 426 MG/DL — HIGH (ref 70–99)
GLUCOSE SERPL-MCNC: 169 MG/DL — HIGH (ref 70–99)
HCT VFR BLD CALC: 36.3 % — LOW (ref 37–47)
HGB BLD-MCNC: 11.6 G/DL — LOW (ref 12–16)
LYMPHOCYTES # BLD AUTO: 0.42 K/UL — LOW (ref 1.2–3.4)
LYMPHOCYTES # BLD AUTO: 12.3 % — LOW (ref 20.5–51.1)
MAGNESIUM SERPL-MCNC: 1.7 MG/DL — LOW (ref 1.8–2.4)
MANUAL SMEAR VERIFICATION: SIGNIFICANT CHANGE UP
MCHC RBC-ENTMCNC: 28.2 PG — SIGNIFICANT CHANGE UP (ref 27–31)
MCHC RBC-ENTMCNC: 32 G/DL — SIGNIFICANT CHANGE UP (ref 32–37)
MCV RBC AUTO: 88.1 FL — SIGNIFICANT CHANGE UP (ref 81–99)
MONOCYTES # BLD AUTO: 0.63 K/UL — HIGH (ref 0.1–0.6)
MONOCYTES NFR BLD AUTO: 18.4 % — HIGH (ref 1.7–9.3)
NEUTROPHILS # BLD AUTO: 2.23 K/UL — SIGNIFICANT CHANGE UP (ref 1.4–6.5)
NEUTROPHILS NFR BLD AUTO: 64.9 % — SIGNIFICANT CHANGE UP (ref 42.2–75.2)
PLAT MORPH BLD: ABNORMAL
PLATELET # BLD AUTO: 237 K/UL — SIGNIFICANT CHANGE UP (ref 130–400)
PMV BLD: 11.6 FL — HIGH (ref 7.4–10.4)
POIKILOCYTOSIS BLD QL AUTO: SLIGHT — SIGNIFICANT CHANGE UP
POLYCHROMASIA BLD QL SMEAR: SIGNIFICANT CHANGE UP
POTASSIUM SERPL-MCNC: 3.9 MMOL/L — SIGNIFICANT CHANGE UP (ref 3.5–5)
POTASSIUM SERPL-SCNC: 3.9 MMOL/L — SIGNIFICANT CHANGE UP (ref 3.5–5)
PROT SERPL-MCNC: 6.1 G/DL — SIGNIFICANT CHANGE UP (ref 6–8)
RBC # BLD: 4.12 M/UL — LOW (ref 4.2–5.4)
RBC # FLD: 14.6 % — HIGH (ref 11.5–14.5)
RBC BLD AUTO: ABNORMAL
SODIUM SERPL-SCNC: 138 MMOL/L — SIGNIFICANT CHANGE UP (ref 135–146)
TROPONIN T SERPL-MCNC: <0.01 NG/ML — SIGNIFICANT CHANGE UP
WBC # BLD: 3.44 K/UL — LOW (ref 4.8–10.8)
WBC # FLD AUTO: 3.44 K/UL — LOW (ref 4.8–10.8)

## 2023-07-07 PROCEDURE — 93010 ELECTROCARDIOGRAM REPORT: CPT

## 2023-07-07 PROCEDURE — 99232 SBSQ HOSP IP/OBS MODERATE 35: CPT

## 2023-07-07 PROCEDURE — 99223 1ST HOSP IP/OBS HIGH 75: CPT

## 2023-07-07 RX ORDER — INSULIN LISPRO 100/ML
3 VIAL (ML) SUBCUTANEOUS
Refills: 0 | Status: DISCONTINUED | OUTPATIENT
Start: 2023-07-07 | End: 2023-07-11

## 2023-07-07 RX ORDER — INSULIN GLARGINE 100 [IU]/ML
16 INJECTION, SOLUTION SUBCUTANEOUS AT BEDTIME
Refills: 0 | Status: DISCONTINUED | OUTPATIENT
Start: 2023-07-07 | End: 2023-07-11

## 2023-07-07 RX ORDER — MAGNESIUM SULFATE 500 MG/ML
2 VIAL (ML) INJECTION ONCE
Refills: 0 | Status: COMPLETED | OUTPATIENT
Start: 2023-07-07 | End: 2023-07-07

## 2023-07-07 RX ADMIN — Medication 2: at 16:44

## 2023-07-07 RX ADMIN — REMDESIVIR 200 MILLIGRAM(S): 5 INJECTION INTRAVENOUS at 17:51

## 2023-07-07 RX ADMIN — Medication 25 GRAM(S): at 12:02

## 2023-07-07 RX ADMIN — APIXABAN 2.5 MILLIGRAM(S): 2.5 TABLET, FILM COATED ORAL at 17:14

## 2023-07-07 RX ADMIN — Medication 100 MICROGRAM(S): at 05:36

## 2023-07-07 RX ADMIN — ANASTROZOLE 1 MILLIGRAM(S): 1 TABLET ORAL at 11:47

## 2023-07-07 RX ADMIN — APIXABAN 2.5 MILLIGRAM(S): 2.5 TABLET, FILM COATED ORAL at 05:36

## 2023-07-07 RX ADMIN — Medication 20 MILLIGRAM(S): at 05:36

## 2023-07-07 RX ADMIN — Medication 3 UNIT(S): at 16:44

## 2023-07-07 RX ADMIN — Medication 12: at 11:46

## 2023-07-07 RX ADMIN — INSULIN GLARGINE 16 UNIT(S): 100 INJECTION, SOLUTION SUBCUTANEOUS at 22:06

## 2023-07-07 RX ADMIN — Medication 2: at 07:56

## 2023-07-07 RX ADMIN — Medication 60 MILLIGRAM(S): at 05:36

## 2023-07-07 NOTE — PROGRESS NOTE ADULT - SUBJECTIVE AND OBJECTIVE BOX
LORETTA SALCEDO  87y  Female      Patient is a 87y old  Female who presents with a chief complaint of COVID   patient bradycardic EP consulted        INTERVAL HPI/OVERNIGHT EVENTS:      ******************************* REVIEW OF SYSTEMS:**********************************************    All other review of systems negative    *********************** VITALS ******************************************    Vital Signs Last 24 Hrs  T(C): 36.2 (07 Jul 2023 12:34), Max: 36.6 (07 Jul 2023 00:45)  T(F): 97.1 (07 Jul 2023 12:34), Max: 97.8 (07 Jul 2023 00:45)  HR: 65 (07 Jul 2023 12:34) (51 - 68)  BP: 132/59 (07 Jul 2023 12:34) (132/59 - 166/70)  BP(mean): 101 (07 Jul 2023 06:58) (90 - 101)  RR: 18 (07 Jul 2023 12:34) (18 - 18)  SpO2: 97% (07 Jul 2023 06:58) (96% - 97%)    Parameters below as of 07 Jul 2023 06:58  Patient On (Oxygen Delivery Method): room air                07-05-23 @ 07:01  -  07-06-23 @ 07:00  --------------------------------------------------------  IN: 0 mL / OUT: 1300 mL / NET: -1300 mL        ******************************** PHYSICAL EXAM:**************************************************  GENERAL: NAD    PSYCH: no agitation, baseline mentation  HEENT:     NERVOUS SYSTEM:  Alert & Oriented X3,   PULMONARY: MAUREEN, CTA    CARDIOVASCULAR: S1S2 bradycardia    GI: Soft, NT, ND; BS present.    EXTREMITIES:  2+ Peripheral Pulses, No clubbing, cyanosis, or edema    LYMPH: No lymphadenopathy noted    SKIN: No rashes or lesions      **************************** LABS *******************************************************                 LABS:                        11.6   3.44  )-----------( 237      ( 07 Jul 2023 06:04 )             36.3     07-07    138  |  98  |  29<H>  ----------------------------<  169<H>  3.9   |  29  |  0.8    Ca    8.9      07 Jul 2023 06:04  Mg     1.7     07-07    TPro  6.1  /  Alb  3.3<L>  /  TBili  0.4  /  DBili  x   /  AST  25  /  ALT  35  /  AlkPhos  94  07-07              CAPILLARY BLOOD GLUCOSE      POCT Blood Glucose.: 98 mg/dL (06 Jul 2023 11:29)          **************************Active Medications *******************************************  No Known Allergies      acetaminophen     Tablet .. 650 milliGRAM(s) Oral every 6 hours PRN  aluminum hydroxide/magnesium hydroxide/simethicone Suspension 30 milliLiter(s) Oral every 4 hours PRN  anastrozole 1 milliGRAM(s) Oral daily  apixaban 2.5 milliGRAM(s) Oral every 12 hours  dextrose 5%. 1000 milliLiter(s) IV Continuous <Continuous>  dextrose 5%. 1000 milliLiter(s) IV Continuous <Continuous>  dextrose 50% Injectable 25 Gram(s) IV Push once  dextrose 50% Injectable 12.5 Gram(s) IV Push once  dextrose 50% Injectable 25 Gram(s) IV Push once  dextrose Oral Gel 15 Gram(s) Oral once PRN  furosemide   Injectable 20 milliGRAM(s) IV Push daily  glucagon  Injectable 1 milliGRAM(s) IntraMuscular once  hydrALAZINE 50 milliGRAM(s) Oral four times a day PRN  insulin glargine Injectable (LANTUS) 12 Unit(s) SubCutaneous at bedtime  insulin lispro (ADMELOG) corrective regimen sliding scale   SubCutaneous three times a day before meals  levothyroxine 100 MICROGram(s) Oral daily  melatonin 3 milliGRAM(s) Oral at bedtime PRN  metoprolol tartrate 100 milliGRAM(s) Oral two times a day  NIFEdipine XL 60 milliGRAM(s) Oral daily  ondansetron Injectable 4 milliGRAM(s) IV Push every 8 hours PRN  remdesivir  IVPB   IV Intermittent   remdesivir  IVPB 100 milliGRAM(s) IV Intermittent every 24 hours      ***************************************************  RADIOLOGY & ADDITIONAL TESTS:    Imaging Personally Reviewed:  [ ] YES  [ ] NO    HEALTH ISSUES - PROBLEM Dx:

## 2023-07-07 NOTE — PATIENT PROFILE ADULT - FALL HARM RISK - HARM RISK INTERVENTIONS

## 2023-07-07 NOTE — PATIENT PROFILE ADULT - FALL HARM RISK - PATIENT NEEDS ASSISTANCE
Discharge education and follow-up instructions given to mother. Mother verbalized understanding. VSS, NAD. Transported to vehicle in mother's arms, mother in wheelchair.     
Standing/Walking/Toileting/Moving from bed to chair

## 2023-07-07 NOTE — CONSULT NOTE ADULT - ASSESSMENT
87-year-old female w/ Severe PHTN, CKD2, DM, HTN, DL, hypothyroidism, aflutter, invasive ductal carcinoma s/p lumpectomy, endometrial cancer s/p omentectomy s/p pelvic wash, left adrenal nodule presenting for hypoxia requiring oxygen at NH, malaise. She slipped and fell as well. Trauma w/u -ve (XR Pelvis, CT H&N) the patient was noted to be COVID (+) with acute respiratory failure with hypoxia, severe pulmonary hypertension and known chronic atrial flutter atrial fibrillation. The patient was noted on tele to have bradycardia for which electrophysiology was consulted. Tele was reviewed revealing intermittent episodes of atrial flutter with slow ventricular response likely driven by vagal tone giving her current condition and illness state.  87-year-old female w/ Severe PHTN, CKD2, DM, HTN, DL, hypothyroidism, aflutter, invasive ductal carcinoma s/p lumpectomy, endometrial cancer s/p omentectomy s/p pelvic wash, left adrenal nodule presenting for hypoxia requiring oxygen at NH, malaise. She slipped and fell as well. Trauma w/u -ve (XR Pelvis, CT H&N) the patient was noted to be COVID (+) with acute respiratory failure with hypoxia, severe pulmonary hypertension and known chronic atrial flutter atrial fibrillation. The patient was noted on tele to have bradycardia for which electrophysiology was consulted. Tele was reviewed revealing intermittent episodes of atrial flutter with slow ventricular response likely driven by vagal tone giving her current condition and illness state. She has normal left ventricular systolic function with severely dilated LA, narrow complex qrs.     Plan  - The patient appears to be having vagally mediated episodes of atrial flutter with slow ventricular response in the setting of an acute COVID infection  - Continue to monitor the patient  - Treat the underlying her for COVID-19  - Anticoagulation: CHADS-VASC score of 5 on Eliquis  - Contact electrophysiology if the patient continues to have slow ventricular response after the patient has recovered from her infection as well as experiencing symptoms related to her bradycardia.

## 2023-07-07 NOTE — PROGRESS NOTE ADULT - ASSESSMENT
87-year-old female w/ Severe PHTN, CKD2, DM, HTN, DL, hypothyroidism, aflutter, invasive ductal carcinoma s/p lumpectomy, endometrial cancer s/p omentectomy s/p pelvic wash, left adrenal nodule presenting for hypoxia requiring oxygen at NH, malaise. She slipped and fell as well.     # Acute Hypoxic Respiratory Failure likely due to COVID  - Improving clinially, off O2, currently on RA  - rdv x 3 days as per ID     # Suspected metabolic encephalopathy  -resolving     # CKD2  # DM  # HTN  # DL  # aflutter  - c/w Eliquis 2.5  - c/w metoprolol, hydralazine  changed diet order to carb controlled  increased bedtime insulin to 16 units   add lispro 3 units TID d/w resident     #bradycardia  EP consulted  no intervention at this time  follow EP recommendations. reconsult in case of any worsening bradycardia/symptoms      # hypothyroidism  - c/w Synthroid    # Severe PHTN  # ? HF Exacerbation  - Lasix 20 IV QD  - on room air now  ordered repeat echo   may need to conmsult cardiology based on echo report     # elevated AST/ALT >> Likely due to COVID  nearly normalized    # left adrenal nodule  - outpatient MRI    # invasive ductal carcinoma s/p lumpectomy  # endometrial cancer s/p omentectomy s/p pelvic wash  # right lung nodule resection  # TAHBSO  - outpatient f/u  - c/w anastrazole    Pending: RDV  Dispo: Sabrina Mattson updating family    follow up: on remdesevir. on room air, monitor blood glucose levels. Ajust insulin based on blood glucose levels.  Fllow echo, follow EP. Mnitor for any bradycardia.  Contct EP if worsening.

## 2023-07-07 NOTE — CONSULT NOTE ADULT - NS ATTEND AMEND GEN_ALL_CORE FT
Chronic AF with slow VR likely 2/2 COVID  Rec cont to monitor and cont Eliquis 2.5 mg PO BID  Recall if pt has pauses > 5 sec or has symptomatic jemima

## 2023-07-07 NOTE — PROGRESS NOTE ADULT - ASSESSMENT
87-year-old female w/ Severe PHTN, CKD2, DM, HTN, DL, hypothyroidism, aflutter, invasive ductal carcinoma s/p lumpectomy, endometrial cancer s/p omentectomy s/p pelvic wash, left adrenal nodule presenting for hypoxia requiring oxygen at NH, malaise. She slipped and fell as well. Patient had epsiodes of bradycardia and abnoral tele readings, EP was consulted. EP reported  intermittent episodes of atrial flutter with slow ventricular response likely driven by vagal tone giving her current condition and illness state. She has normal left ventricular systolic function with severely dilated LA, narrow complex qrs.     # Acute Hypoxic Respiratory Failure likely due to COVID  - Improving clinially, off O2, currently on RA  - rdv x 3 days as per ID; patient is currently on day 2.    # Suspected metabolic encephalopathy  - resolving     # CKD2  # DM  # HTN  # DL  # aflutter  - c/w Eliquis 2.5  - c/w hydralizine  - metoprolol was d/c due to bradycarida episodes  - EP recomended the following:  - Continue to monitor the patient  - Treat the underlying her for COVID-19  - Anticoagulation: CHADS-VASC score of 5 on Eliquis  - Contact electrophysiology if the patient continues to have slow ventricular response after the patient has recovered from her infection as well as experiencing symptoms related to her bradycardia.     #Elevated BNP  - F/U echo    # hypothyroidism  - c/w Synthroid    # Severe PHTN  # ? HF Exacerbation  - Lasix 20 IV QD  - on room air now    # elevated AST/ALT >> Likely due to COVID  nearly normalized    # left adrenal nodule  - outpatient MRI    # invasive ductal carcinoma s/p lumpectomy  # endometrial cancer s/p omentectomy s/p pelvic wash  # right lung nodule resection  # TAHBSO  - outpatient f/u  - c/w anastrazole    Pending: RDV  Dispo: Sabrina ARNOLD

## 2023-07-07 NOTE — CONSULT NOTE ADULT - SUBJECTIVE AND OBJECTIVE BOX
Patient is a 87y old  Female who presents with a chief complaint of covid (2023 17:25)    HPI:           PAST MEDICAL & SURGICAL HISTORY:  HTN (hypertension)      DM (diabetes mellitus)      Hypothyroidism  last blood test ~ 3months ago Spet    no changes in meds      HLD (hyperlipidemia)  PT DENIES      CKD (chronic kidney disease)  stage 3A   PT DENIES      Occasional tremors  X 10 + YEARS      Nodule of right lung  s/p resection      Back pain  pain management injections dr rodriguez      Atrial flutter  PER CARDIAC NOTE      History of pulmonary hypertension      Severe pulmonary hypertension      [place] ([place] of hearing[place]      History of lung surgery  lung nodule resection      History of hernia surgery  left inquinal      S/P LARA-BSO (total abdominal hysterectomy and bilateral salpingo-oophorectomy)      S/P LARA-BSO      S/P cataract surgery                      PREVIOUS DIAGNOSTIC TESTING:      ECHO  FINDINGS: :  1. Normal global left ventricular systolic function. 2. Severely enlarged left atrium.  3. Mildly increased LV wall thickness.  4. The left ventricular diastolic function could not be assessed in this study.  5. Normal right atrial size.  6. Moderate to severe mitral annular calcification.  7. Trace mitral valve regurgitation.  8. Mild-moderate tricuspid regurgitation.  9. Mild aortic valve stenosis. 10. Mild pulmonic valve regurgitation. 11. Estimated pulmonary artery systolic pressure is 70.4 mmHg assuming a right atrial pressure of 3 mmHg, which is consistent with severe pulmonary hypertension. 12. Pulmonary hypertension is present. 13. Increased relative wall thickness with normal mass index consistent with left ventricular concentric remodeling. 14. LA volume Index is 49.2 ml/m² ml/m2. 15. Peak transaortic gradient equals 16.3 mmHg, mean transaortic gradient equals 8.3 mmHg, the calculated aortic valve area equals 1.15 cm² by the continuity equation consistent with mild aortic stenosis. 16. The aortic valve mean gradient is 8.3 mmHg consistent with mild aortic stenosis.    STRESS  FINDINGS: : 1. IV Lexiscan Dual Isotope Study which was negative with respect to symptoms and EKG changes. 2. Myocardial perfusion imaging reveals no fixed no reperfusion defects 3. Gated imaging reveals normal wall motion thickening and ejection fraction      CATHETERIZATION  FINDINGS:    ELECTROPHYSIOLOGY STUDY  FINDINGS:    CAROTID ULTRASOUND:  FINDINGS    VENOUS DUPLEX SCAN:  FINDINGS:    CHEST CT PULMONARY ANGIO with IV Contrast:  FINDINGS:    MEDICATIONS  (STANDING):  anastrozole 1 milliGRAM(s) Oral daily  apixaban 2.5 milliGRAM(s) Oral every 12 hours  dextrose 5%. 1000 milliLiter(s) (100 mL/Hr) IV Continuous <Continuous>  dextrose 5%. 1000 milliLiter(s) (50 mL/Hr) IV Continuous <Continuous>  dextrose 50% Injectable 25 Gram(s) IV Push once  dextrose 50% Injectable 12.5 Gram(s) IV Push once  dextrose 50% Injectable 25 Gram(s) IV Push once  furosemide   Injectable 20 milliGRAM(s) IV Push daily  glucagon  Injectable 1 milliGRAM(s) IntraMuscular once  insulin glargine Injectable (LANTUS) 12 Unit(s) SubCutaneous at bedtime  insulin lispro (ADMELOG) corrective regimen sliding scale   SubCutaneous three times a day before meals  levothyroxine 100 MICROGram(s) Oral daily  magnesium sulfate  IVPB 2 Gram(s) IV Intermittent once  NIFEdipine XL 60 milliGRAM(s) Oral daily  remdesivir  IVPB 100 milliGRAM(s) IV Intermittent every 24 hours  remdesivir  IVPB   IV Intermittent     MEDICATIONS  (PRN):  acetaminophen     Tablet .. 650 milliGRAM(s) Oral every 6 hours PRN Temp greater or equal to 38C (100.4F), Mild Pain (1 - 3)  aluminum hydroxide/magnesium hydroxide/simethicone Suspension 30 milliLiter(s) Oral every 4 hours PRN Dyspepsia  dextrose Oral Gel 15 Gram(s) Oral once PRN Blood Glucose LESS THAN 70 milliGRAM(s)/deciliter  hydrALAZINE 50 milliGRAM(s) Oral four times a day PRN Systolic blood pressure > 140  melatonin 3 milliGRAM(s) Oral at bedtime PRN Insomnia  ondansetron Injectable 4 milliGRAM(s) IV Push every 8 hours PRN Nausea and/or Vomiting      FAMILY HISTORY:  Known health problems: none        SOCIAL HISTORY:    CIGARETTES:    ALCOHOL:    Past Surgical History:    Allergies:    No Known Allergies      REVIEW OF SYSTEMS:    CONSTITUTIONAL: No fever, weight loss, chills, shakes, or fatigue  EYES: No eye pain, visual disturbances, or discharge  ENMT:  No difficulty hearing, tinnitus, vertigo; No sinus or throat pain  NECK: No pain or stiffness  BREASTS: No pain, masses, or nipple discharge  RESPIRATORY: No cough, wheezing, hemoptysis, or shortness of breath  CARDIOVASCULAR: No chest pain, dyspnea, palpitations, dizziness, syncope, paroxysmal nocturnal dyspnea, orthopnea, or arm or leg swelling  GASTROINTESTINAL: No abdominal  or epigastric pain, nausea, vomiting, hematemesis, diarrhea, constipation, melena or bright red blood.  GENITOURINARY: No dysuria, nocturia, hematuria, or urinary incontinence  NEUROLOGICAL: No headaches, memory loss, slurred speech, limb weakness, loss of strength, numbness, or tremors  SKIN: No itching, burning, rashes, or lesions   LYMPH NODES: No enlarged glands  ENDOCRINE: No heat or cold intolerance, or hair loss  MUSCULOSKELETAL: No joint pain or swelling, muscle, back, or extremity pain  PSYCHIATRIC: No depression, anxiety, or difficulty sleeping  HEME/LYMPH: No easy bruising or bleeding gums  ALLERY AND IMMUNOLOGIC: No hives or rash.      Vital Signs Last 24 Hrs  T(C): 36.2 (2023 06:58), Max: 36.6 (2023 00:45)  T(F): 97.2 (2023 06:58), Max: 97.8 (2023 00:45)  HR: 51 (2023 06:58) (51 - 68)  BP: 166/70 (2023 06:58) (132/67 - 166/70)  BP(mean): 101 (2023 06:58) (90 - 101)  RR: 18 (2023 06:58) (18 - 18)  SpO2: 97% (2023 06:58) (96% - 97%)    Parameters below as of 2023 06:58  Patient On (Oxygen Delivery Method): room air        PHYSICAL EXAM:        GENERAL: In no apparent distress, well nourished, and hydrated.  HEAD:  Atraumatic, Normocephalic  EYES: EOMI, PERRLA, conjunctiva and sclera clear  ENMT: No tonsillar erythema, exudates, or enlargements; ist mucous membranes, Good dentition, No lesions  NECK: Supple and normal thyroid.  No JVD or carotid bruit.  Carotid pulse is 2+ bilaterally.  HEART: Regular rate and rhythm; No murmurs, rubs, or gallops.  PULMONARY: Clear to auscultation and perfusion.  No rales, wheezing, or rhonchi bilaterally.  ABDOMEN: Soft, Nontender, Nondistended; Bowel sounds present  EXTREMITIES:  2+ Peripheral Pulses, No clubbing, cyanosis, or edema  LYMPH: No lymphadenopathy noted  NEUROLOGICAL: Grossly nonfocal      INTERPRETATION OF TELEMETRY:    ECG:    I&O's Detail    2023 07:01  -  2023 07:00  --------------------------------------------------------  IN:  Total IN: 0 mL    OUT:    Voided (mL): 800 mL  Total OUT: 800 mL    Total NET: -800 mL          LABS:                        11.6   3.44  )-----------( 237      ( 2023 06:04 )             36.3     07-    138  |  98  |  29<H>  ----------------------------<  169<H>  3.9   |  29  |  0.8    Ca    8.9      2023 06:04  Mg     1.7     07-07    TPro  6.1  /  Alb  3.3<L>  /  TBili  0.4  /  DBili  x   /  AST  25  /  ALT  35  /  AlkPhos  94  07-07          Urinalysis Basic - ( 2023 06:04 )    Color: x / Appearance: x / SG: x / pH: x  Gluc: 169 mg/dL / Ketone: x  / Bili: x / Urobili: x   Blood: x / Protein: x / Nitrite: x   Leuk Esterase: x / RBC: x / WBC x   Sq Epi: x / Non Sq Epi: x / Bacteria: x      BNP  I&O's Detail    2023 07:01  -  2023 07:00  --------------------------------------------------------  IN:  Total IN: 0 mL    OUT:    Voided (mL): 800 mL  Total OUT: 800 mL    Total NET: -800 mL        Daily     Daily Weight in k (2023 01:53)    RADIOLOGY & ADDITIONAL STUDIES: Patient is a 87y old  Female who presents with a chief complaint of covid (2023 17:25)    HPI: 87-year-old female w/ Severe PHTN, CKD2, DM, HTN, DL, hypothyroidism, aflutter, invasive ductal carcinoma s/p lumpectomy, endometrial cancer s/p omentectomy s/p pelvic wash, left adrenal nodule presenting for hypoxia requiring oxygen at NH, malaise. She slipped and fell as well. Trauma w/u -ve (XR Pelvis, CT H&N) the patient was noted to be COVID (+) with acute respiratory failure with hypoxia, severe pulmonary hypertension and known chronic atrial flutter atrial fibrillation. The patient was noted on tele to have bradycardia for which electrophysiology was consulted. Tele was reviewed revealing intermittent episodes of atrial flutter with slow ventricular response likely driven by vagal tone giving her current condition and illness state.     PAST MEDICAL & SURGICAL HISTORY:  HTN (hypertension)  DM (diabetes mellitus)  Hypothyroidism  last blood test ~ 3months ago Spet    no changes in meds  HLD (hyperlipidemia)  PT DENIES  CKD (chronic kidney disease)  stage 3A   PT DENIES  Occasional tremors  X 10 + YEARS  Nodule of right lung  s/p resection  Back pain  pain management injections dr rodriguez  Atrial flutter  PER CARDIAC NOTE  History of pulmonary hypertension  Severe pulmonary hypertension  [place]  History of lung surgery  lung nodule resection  History of hernia surgery  left inquinal  S/P LARA-BSO (total abdominal hysterectomy and bilateral salpingo-oophorectomy)  S/P LARA-BSO  S/P cataract surgery    PREVIOUS DIAGNOSTIC TESTING:      ECHO  FINDINGS: :  1. Normal global left ventricular systolic function. 2. Severely enlarged left atrium.  3. Mildly increased LV wall thickness.  4. The left ventricular diastolic function could not be assessed in this study.  5. Normal right atrial size.  6. Moderate to severe mitral annular calcification.  7. Trace mitral valve regurgitation.  8. Mild-moderate tricuspid regurgitation.  9. Mild aortic valve stenosis. 10. Mild pulmonic valve regurgitation. 11. Estimated pulmonary artery systolic pressure is 70.4 mmHg assuming a right atrial pressure of 3 mmHg, which is consistent with severe pulmonary hypertension. 12. Pulmonary hypertension is present. 13. Increased relative wall thickness with normal mass index consistent with left ventricular concentric remodeling. 14. LA volume Index is 49.2 ml/m² ml/m2. 15. Peak transaortic gradient equals 16.3 mmHg, mean transaortic gradient equals 8.3 mmHg, the calculated aortic valve area equals 1.15 cm² by the continuity equation consistent with mild aortic stenosis. 16. The aortic valve mean gradient is 8.3 mmHg consistent with mild aortic stenosis.    STRESS  FINDINGS: : 1. IV Lexiscan Dual Isotope Study which was negative with respect to symptoms and EKG changes. 2. Myocardial perfusion imaging reveals no fixed no reperfusion defects 3. Gated imaging reveals normal wall motion thickening and ejection fraction    MEDICATIONS  (STANDING):  anastrozole 1 milliGRAM(s) Oral daily  apixaban 2.5 milliGRAM(s) Oral every 12 hours  dextrose 5%. 1000 milliLiter(s) (100 mL/Hr) IV Continuous <Continuous>  dextrose 5%. 1000 milliLiter(s) (50 mL/Hr) IV Continuous <Continuous>  dextrose 50% Injectable 25 Gram(s) IV Push once  dextrose 50% Injectable 12.5 Gram(s) IV Push once  dextrose 50% Injectable 25 Gram(s) IV Push once  furosemide   Injectable 20 milliGRAM(s) IV Push daily  glucagon  Injectable 1 milliGRAM(s) IntraMuscular once  insulin glargine Injectable (LANTUS) 12 Unit(s) SubCutaneous at bedtime  insulin lispro (ADMELOG) corrective regimen sliding scale   SubCutaneous three times a day before meals  levothyroxine 100 MICROGram(s) Oral daily  magnesium sulfate  IVPB 2 Gram(s) IV Intermittent once  NIFEdipine XL 60 milliGRAM(s) Oral daily  remdesivir  IVPB 100 milliGRAM(s) IV Intermittent every 24 hours  remdesivir  IVPB   IV Intermittent     MEDICATIONS  (PRN):  acetaminophen     Tablet .. 650 milliGRAM(s) Oral every 6 hours PRN Temp greater or equal to 38C (100.4F), Mild Pain (1 - 3)  aluminum hydroxide/magnesium hydroxide/simethicone Suspension 30 milliLiter(s) Oral every 4 hours PRN Dyspepsia  dextrose Oral Gel 15 Gram(s) Oral once PRN Blood Glucose LESS THAN 70 milliGRAM(s)/deciliter  hydrALAZINE 50 milliGRAM(s) Oral four times a day PRN Systolic blood pressure > 140  melatonin 3 milliGRAM(s) Oral at bedtime PRN Insomnia  ondansetron Injectable 4 milliGRAM(s) IV Push every 8 hours PRN Nausea and/or Vomiting      FAMILY HISTORY:  Known health problems: none    SOCIAL HISTORY: nursing home    CIGARETTES:    ALCOHOL:    Past Surgical History:    Allergies:    No Known Allergies      REVIEW OF SYSTEMS:    CONSTITUTIONAL: No fever, weight loss, chills, shakes, or fatigue  EYES: No eye pain, visual disturbances, or discharge  ENMT:  No difficulty hearing, tinnitus, vertigo; No sinus or throat pain  NECK: No pain or stiffness  BREASTS: No pain, masses, or nipple discharge  RESPIRATORY: No cough, wheezing, hemoptysis, or shortness of breath  CARDIOVASCULAR: No chest pain, dyspnea, palpitations, dizziness, syncope, paroxysmal nocturnal dyspnea, orthopnea, or arm or leg swelling  GASTROINTESTINAL: No abdominal  or epigastric pain, nausea, vomiting, hematemesis, diarrhea, constipation, melena or bright red blood.  GENITOURINARY: No dysuria, nocturia, hematuria, or urinary incontinence  NEUROLOGICAL: No headaches, memory loss, slurred speech, limb weakness, loss of strength, numbness, or tremors  SKIN: No itching, burning, rashes, or lesions   LYMPH NODES: No enlarged glands  ENDOCRINE: No heat or cold intolerance, or hair loss  MUSCULOSKELETAL: No joint pain or swelling, muscle, back, or extremity pain  PSYCHIATRIC: No depression, anxiety, or difficulty sleeping  HEME/LYMPH: No easy bruising or bleeding gums  ALLERY AND IMMUNOLOGIC: No hives or rash.      Vital Signs Last 24 Hrs  T(C): 36.2 (2023 06:58), Max: 36.6 (2023 00:45)  T(F): 97.2 (2023 06:58), Max: 97.8 (2023 00:45)  HR: 51 (2023 06:58) (51 - 68)  BP: 166/70 (2023 06:58) (132/67 - 166/70)  BP(mean): 101 (2023 06:58) (90 - 101)  RR: 18 (2023 06:58) (18 - 18)  SpO2: 97% (2023 06:58) (96% - 97%)    Parameters below as of 2023 06:58  Patient On (Oxygen Delivery Method): room air        PHYSICAL EXAM:        GENERAL: In no apparent distress, well nourished, and hydrated.  HEAD:  Atraumatic, Normocephalic  EYES: EOMI, PERRLA, conjunctiva and sclera clear  ENMT: No tonsillar erythema, exudates, or enlargements; ist mucous membranes, Good dentition, No lesions  NECK: Supple and normal thyroid.  No JVD or carotid bruit.  Carotid pulse is 2+ bilaterally.  HEART: Regular rate and rhythm; No murmurs, rubs, or gallops.  PULMONARY: Clear to auscultation and perfusion.  No rales, wheezing, or rhonchi bilaterally.  ABDOMEN: Soft, Nontender, Nondistended; Bowel sounds present  EXTREMITIES:  2+ Peripheral Pulses, No clubbing, cyanosis, or edema  LYMPH: No lymphadenopathy noted  NEUROLOGICAL: Grossly nonfocal      INTERPRETATION OF TELEMETRY:    ECG:    I&O's Detail    2023 07:01  -  2023 07:00  --------------------------------------------------------  IN:  Total IN: 0 mL    OUT:    Voided (mL): 800 mL  Total OUT: 800 mL    Total NET: -800 mL          LABS:                        11.6   3.44  )-----------( 237      ( 2023 06:04 )             36.3     07-07    138  |  98  |  29<H>  ----------------------------<  169<H>  3.9   |  29  |  0.8    Ca    8.9      2023 06:04  Mg     1.7     07-07    TPro  6.1  /  Alb  3.3<L>  /  TBili  0.4  /  DBili  x   /  AST  25  /  ALT  35  /  AlkPhos  94  07-07          Urinalysis Basic - ( 2023 06:04 )    Color: x / Appearance: x / SG: x / pH: x  Gluc: 169 mg/dL / Ketone: x  / Bili: x / Urobili: x   Blood: x / Protein: x / Nitrite: x   Leuk Esterase: x / RBC: x / WBC x   Sq Epi: x / Non Sq Epi: x / Bacteria: x      BNP  I&O's Detail    2023 07:01  -  2023 07:00  --------------------------------------------------------  IN:  Total IN: 0 mL    OUT:    Voided (mL): 800 mL  Total OUT: 800 mL    Total NET: -800 mL        Daily     Daily Weight in k (2023 01:53)    RADIOLOGY & ADDITIONAL STUDIES: Patient is a 87y old  Female who presents with a chief complaint of covid (2023 17:25)    HPI: 87-year-old female w/ Severe PHTN, CKD2, DM, HTN, DL, hypothyroidism, aflutter, invasive ductal carcinoma s/p lumpectomy, endometrial cancer s/p omentectomy s/p pelvic wash, left adrenal nodule presenting for hypoxia requiring oxygen at NH, malaise. She slipped and fell as well. Trauma w/u -ve (XR Pelvis, CT H&N) the patient was noted to be COVID (+) with acute respiratory failure with hypoxia, severe pulmonary hypertension and known chronic atrial flutter atrial fibrillation. The patient was noted on tele to have bradycardia for which electrophysiology was consulted. Tele was reviewed revealing intermittent episodes of atrial flutter with slow ventricular response likely driven by vagal tone giving her current condition and illness state.     PAST MEDICAL & SURGICAL HISTORY:  HTN (hypertension)  DM (diabetes mellitus)  Hypothyroidism  last blood test ~ 3months ago Spet    no changes in meds  HLD (hyperlipidemia)  PT DENIES  CKD (chronic kidney disease)  stage 3A   PT DENIES  Occasional tremors  X 10 + YEARS  Nodule of right lung  s/p resection  Back pain  pain management injections dr rodriguez  Atrial flutter  PER CARDIAC NOTE  History of pulmonary hypertension  Severe pulmonary hypertension  [place] (hard of hearing)  History of lung surgery  lung nodule resection  History of hernia surgery  left inquinal  S/P LARA-BSO (total abdominal hysterectomy and bilateral salpingo-oophorectomy)  S/P LARA-BSO  S/P cataract surgery    PREVIOUS DIAGNOSTIC TESTING:      ECHO  FINDINGS: :  1. Normal global left ventricular systolic function. 2. Severely enlarged left atrium.  3. Mildly increased LV wall thickness.  4. The left ventricular diastolic function could not be assessed in this study.  5. Normal right atrial size.  6. Moderate to severe mitral annular calcification.  7. Trace mitral valve regurgitation.  8. Mild-moderate tricuspid regurgitation.  9. Mild aortic valve stenosis. 10. Mild pulmonic valve regurgitation. 11. Estimated pulmonary artery systolic pressure is 70.4 mmHg assuming a right atrial pressure of 3 mmHg, which is consistent with severe pulmonary hypertension. 12. Pulmonary hypertension is present. 13. Increased relative wall thickness with normal mass index consistent with left ventricular concentric remodeling. 14. LA volume Index is 49.2 ml/m² ml/m2. 15. Peak transaortic gradient equals 16.3 mmHg, mean transaortic gradient equals 8.3 mmHg, the calculated aortic valve area equals 1.15 cm² by the continuity equation consistent with mild aortic stenosis. 16. The aortic valve mean gradient is 8.3 mmHg consistent with mild aortic stenosis.    STRESS  FINDINGS: : 1. IV Lexiscan Dual Isotope Study which was negative with respect to symptoms and EKG changes. 2. Myocardial perfusion imaging reveals no fixed no reperfusion defects 3. Gated imaging reveals normal wall motion thickening and ejection fraction    MEDICATIONS  (STANDING):  anastrozole 1 milliGRAM(s) Oral daily  apixaban 2.5 milliGRAM(s) Oral every 12 hours  dextrose 5%. 1000 milliLiter(s) (100 mL/Hr) IV Continuous <Continuous>  dextrose 5%. 1000 milliLiter(s) (50 mL/Hr) IV Continuous <Continuous>  dextrose 50% Injectable 25 Gram(s) IV Push once  dextrose 50% Injectable 12.5 Gram(s) IV Push once  dextrose 50% Injectable 25 Gram(s) IV Push once  furosemide   Injectable 20 milliGRAM(s) IV Push daily  glucagon  Injectable 1 milliGRAM(s) IntraMuscular once  insulin glargine Injectable (LANTUS) 12 Unit(s) SubCutaneous at bedtime  insulin lispro (ADMELOG) corrective regimen sliding scale   SubCutaneous three times a day before meals  levothyroxine 100 MICROGram(s) Oral daily  magnesium sulfate  IVPB 2 Gram(s) IV Intermittent once  NIFEdipine XL 60 milliGRAM(s) Oral daily  remdesivir  IVPB 100 milliGRAM(s) IV Intermittent every 24 hours  remdesivir  IVPB   IV Intermittent     MEDICATIONS  (PRN):  acetaminophen     Tablet .. 650 milliGRAM(s) Oral every 6 hours PRN Temp greater or equal to 38C (100.4F), Mild Pain (1 - 3)  aluminum hydroxide/magnesium hydroxide/simethicone Suspension 30 milliLiter(s) Oral every 4 hours PRN Dyspepsia  dextrose Oral Gel 15 Gram(s) Oral once PRN Blood Glucose LESS THAN 70 milliGRAM(s)/deciliter  hydrALAZINE 50 milliGRAM(s) Oral four times a day PRN Systolic blood pressure > 140  melatonin 3 milliGRAM(s) Oral at bedtime PRN Insomnia  ondansetron Injectable 4 milliGRAM(s) IV Push every 8 hours PRN Nausea and/or Vomiting      FAMILY HISTORY:  Known health problems: none    SOCIAL HISTORY: nursing home    CIGARETTES: No    ALCOHOL: No    Past Surgical History: As above    Allergies: No Known Allergies    REVIEW OF SYSTEMS:  CONSTITUTIONAL: No fever, weight loss, chills, shakes, (+) fatigue  EYES: No eye pain, visual disturbances, or discharge  ENMT:  No difficulty hearing, tinnitus, vertigo; No sinus or throat pain  NECK: No pain or stiffness  BREASTS: No pain, masses, or nipple discharge  RESPIRATORY: (+) cough, (+) wheezing, hemoptysis, (+) shortness of breath  CARDIOVASCULAR: No chest pain, dyspnea, palpitations, dizziness, syncope, paroxysmal nocturnal dyspnea, orthopnea, or arm or leg swelling  GASTROINTESTINAL: No abdominal  or epigastric pain, nausea, vomiting, hematemesis, diarrhea, constipation, melena or bright red blood.  GENITOURINARY: No dysuria, nocturia, hematuria, or urinary incontinence  NEUROLOGICAL: No headaches, memory loss, slurred speech, limb weakness, loss of strength, numbness, or tremors  SKIN: No itching, burning, rashes, or lesions   LYMPH NODES: No enlarged glands  ENDOCRINE: No heat or cold intolerance, or hair loss  MUSCULOSKELETAL: No joint pain or swelling, muscle, back, or extremity pain  PSYCHIATRIC: No depression, anxiety, or difficulty sleeping  HEME/LYMPH: No easy bruising or bleeding gums  ALLERY AND IMMUNOLOGIC: No hives or rash.      Vital Signs Last 24 Hrs  T(C): 36.2 (2023 06:58), Max: 36.6 (2023 00:45)  T(F): 97.2 (2023 06:58), Max: 97.8 (2023 00:45)  HR: 51 (2023 06:58) (51 - 68)  BP: 166/70 (2023 06:58) (132/67 - 166/70)  BP(mean): 101 (2023 06:58) (90 - 101)  RR: 18 (2023 06:58) (18 - 18)  SpO2: 97% (2023 06:58) (96% - 97%)    Parameters below as of 2023 06:58  Patient On (Oxygen Delivery Method): room air        PHYSICAL EXAM:  GENERAL: In no apparent distress, well nourished, and hydrated.  HEAD:  Atraumatic, Normocephalic  EYES: EOMI, PERRLA, conjunctiva and sclera clear  ENMT: No tonsillar erythema, exudates, or enlargements; ist mucous membranes, Good dentition, No lesions  NECK: Supple and normal thyroid.  No JVD or carotid bruit.  Carotid pulse is 2+ bilaterally.  HEART: Regular rate and rhythm; No murmurs, rubs, or gallops.  PULMONARY: Clear to auscultation and perfusion.  No rales, wheezing, or rhonchi bilaterally.  ABDOMEN: Soft, Nontender, Nondistended; Bowel sounds present  EXTREMITIES:  2+ Peripheral Pulses, No clubbing, cyanosis, or edema  LYMPH: No lymphadenopathy noted  NEUROLOGICAL: Grossly nonfocal      INTERPRETATION OF TELEMETRY: atrial flutter with episodes of slow ventricular response    ECG: Rhythm - atrial flutter with variable ventricular response, Rate - 50bpm, AV Conduction- WNL , Interventricular conduction - WNL, QRS duration - 66ms, Hypertrophy - Absent, Myocardial Infarction - Absent, QT interval - 458ms, WPW - Absent, Brugada pattern - Absent. Other -     I&O's Detail    2023 07:01  -  2023 07:00  --------------------------------------------------------  IN:  Total IN: 0 mL    OUT:    Voided (mL): 800 mL  Total OUT: 800 mL    Total NET: -800 mL          LABS:                        11.6   3.44  )-----------( 237      ( 2023 06:04 )             36.3     07-07    138  |  98  |  29<H>  ----------------------------<  169<H>  3.9   |  29  |  0.8    Ca    8.9      2023 06:04  Mg     1.7     07-07    TPro  6.1  /  Alb  3.3<L>  /  TBili  0.4  /  DBili  x   /  AST  25  /  ALT  35  /  AlkPhos  94  07-07          Urinalysis Basic - ( 2023 06:04 )    Color: x / Appearance: x / SG: x / pH: x  Gluc: 169 mg/dL / Ketone: x  / Bili: x / Urobili: x   Blood: x / Protein: x / Nitrite: x   Leuk Esterase: x / RBC: x / WBC x   Sq Epi: x / Non Sq Epi: x / Bacteria: x      BNP  I&O's Detail    2023 07:01  -  2023 07:00  --------------------------------------------------------  IN:  Total IN: 0 mL    OUT:    Voided (mL): 800 mL  Total OUT: 800 mL    Total NET: -800 mL        Daily     Daily Weight in k (2023 01:53)    RADIOLOGY & ADDITIONAL STUDIES: Patient is a 87y old  Female who presents with a chief complaint of covid (2023 17:25)    HPI: 87-year-old female w/ Severe PHTN, CKD2, DM, HTN, DL, hypothyroidism, aflutter, invasive ductal carcinoma s/p lumpectomy, endometrial cancer s/p omentectomy s/p pelvic wash, left adrenal nodule presenting for hypoxia requiring oxygen at NH, malaise. She slipped and fell as well. Trauma w/u -ve (XR Pelvis, CT H&N) the patient was noted to be COVID (+) with acute respiratory failure with hypoxia, severe pulmonary hypertension and known chronic atrial flutter atrial fibrillation. The patient was noted on tele to have bradycardia for which electrophysiology was consulted. Tele was reviewed revealing atrial flutter with slow ventricular response likely driven by vagal tone giving her current condition and illness state.     PAST MEDICAL & SURGICAL HISTORY:  HTN (hypertension)  DM (diabetes mellitus)  Hypothyroidism  last blood test ~ 3months ago Spet    no changes in meds  HLD (hyperlipidemia)  PT DENIES  CKD (chronic kidney disease)  stage 3A   PT DENIES  Occasional tremors  X 10 + YEARS  Nodule of right lung  s/p resection  Back pain  pain management injections dr rodriguez  Atrial flutter  PER CARDIAC NOTE  History of pulmonary hypertension  Severe pulmonary hypertension  [place] (hard of hearing)  History of lung surgery  lung nodule resection  History of hernia surgery  left inquinal  S/P LARA-BSO (total abdominal hysterectomy and bilateral salpingo-oophorectomy)  S/P LARA-BSO  S/P cataract surgery    PREVIOUS DIAGNOSTIC TESTING:      ECHO  FINDINGS: :  1. Normal global left ventricular systolic function. 2. Severely enlarged left atrium.  3. Mildly increased LV wall thickness.  4. The left ventricular diastolic function could not be assessed in this study.  5. Normal right atrial size.  6. Moderate to severe mitral annular calcification.  7. Trace mitral valve regurgitation.  8. Mild-moderate tricuspid regurgitation.  9. Mild aortic valve stenosis. 10. Mild pulmonic valve regurgitation. 11. Estimated pulmonary artery systolic pressure is 70.4 mmHg assuming a right atrial pressure of 3 mmHg, which is consistent with severe pulmonary hypertension. 12. Pulmonary hypertension is present. 13. Increased relative wall thickness with normal mass index consistent with left ventricular concentric remodeling. 14. LA volume Index is 49.2 ml/m² ml/m2. 15. Peak transaortic gradient equals 16.3 mmHg, mean transaortic gradient equals 8.3 mmHg, the calculated aortic valve area equals 1.15 cm² by the continuity equation consistent with mild aortic stenosis. 16. The aortic valve mean gradient is 8.3 mmHg consistent with mild aortic stenosis.    STRESS  FINDINGS: : 1. IV Lexiscan Dual Isotope Study which was negative with respect to symptoms and EKG changes. 2. Myocardial perfusion imaging reveals no fixed no reperfusion defects 3. Gated imaging reveals normal wall motion thickening and ejection fraction    MEDICATIONS  (STANDING):  anastrozole 1 milliGRAM(s) Oral daily  apixaban 2.5 milliGRAM(s) Oral every 12 hours  dextrose 5%. 1000 milliLiter(s) (100 mL/Hr) IV Continuous <Continuous>  dextrose 5%. 1000 milliLiter(s) (50 mL/Hr) IV Continuous <Continuous>  dextrose 50% Injectable 25 Gram(s) IV Push once  dextrose 50% Injectable 12.5 Gram(s) IV Push once  dextrose 50% Injectable 25 Gram(s) IV Push once  furosemide   Injectable 20 milliGRAM(s) IV Push daily  glucagon  Injectable 1 milliGRAM(s) IntraMuscular once  insulin glargine Injectable (LANTUS) 12 Unit(s) SubCutaneous at bedtime  insulin lispro (ADMELOG) corrective regimen sliding scale   SubCutaneous three times a day before meals  levothyroxine 100 MICROGram(s) Oral daily  magnesium sulfate  IVPB 2 Gram(s) IV Intermittent once  NIFEdipine XL 60 milliGRAM(s) Oral daily  remdesivir  IVPB 100 milliGRAM(s) IV Intermittent every 24 hours  remdesivir  IVPB   IV Intermittent     MEDICATIONS  (PRN):  acetaminophen     Tablet .. 650 milliGRAM(s) Oral every 6 hours PRN Temp greater or equal to 38C (100.4F), Mild Pain (1 - 3)  aluminum hydroxide/magnesium hydroxide/simethicone Suspension 30 milliLiter(s) Oral every 4 hours PRN Dyspepsia  dextrose Oral Gel 15 Gram(s) Oral once PRN Blood Glucose LESS THAN 70 milliGRAM(s)/deciliter  hydrALAZINE 50 milliGRAM(s) Oral four times a day PRN Systolic blood pressure > 140  melatonin 3 milliGRAM(s) Oral at bedtime PRN Insomnia  ondansetron Injectable 4 milliGRAM(s) IV Push every 8 hours PRN Nausea and/or Vomiting      FAMILY HISTORY:  Known health problems: none    SOCIAL HISTORY: nursing home    CIGARETTES: No    ALCOHOL: No    Past Surgical History: As above    Allergies: No Known Allergies    REVIEW OF SYSTEMS:  CONSTITUTIONAL: No fever, weight loss, chills, shakes, (+) fatigue  EYES: No eye pain, visual disturbances, or discharge  ENMT:  No difficulty hearing, tinnitus, vertigo; No sinus or throat pain  NECK: No pain or stiffness  BREASTS: No pain, masses, or nipple discharge  RESPIRATORY: (+) cough, (+) wheezing, hemoptysis, (+) shortness of breath  CARDIOVASCULAR: No chest pain, dyspnea, palpitations, dizziness, syncope, paroxysmal nocturnal dyspnea, orthopnea, or arm or leg swelling  GASTROINTESTINAL: No abdominal  or epigastric pain, nausea, vomiting, hematemesis, diarrhea, constipation, melena or bright red blood.  GENITOURINARY: No dysuria, nocturia, hematuria, or urinary incontinence  NEUROLOGICAL: No headaches, memory loss, slurred speech, limb weakness, loss of strength, numbness, or tremors  SKIN: No itching, burning, rashes, or lesions   LYMPH NODES: No enlarged glands  ENDOCRINE: No heat or cold intolerance, or hair loss  MUSCULOSKELETAL: No joint pain or swelling, muscle, back, or extremity pain  PSYCHIATRIC: No depression, anxiety, or difficulty sleeping  HEME/LYMPH: No easy bruising or bleeding gums  ALLERY AND IMMUNOLOGIC: No hives or rash.      Vital Signs Last 24 Hrs  T(C): 36.2 (2023 06:58), Max: 36.6 (2023 00:45)  T(F): 97.2 (2023 06:58), Max: 97.8 (2023 00:45)  HR: 51 (2023 06:58) (51 - 68)  BP: 166/70 (2023 06:58) (132/67 - 166/70)  BP(mean): 101 (2023 06:58) (90 - 101)  RR: 18 (2023 06:58) (18 - 18)  SpO2: 97% (2023 06:58) (96% - 97%)    Parameters below as of 2023 06:58  Patient On (Oxygen Delivery Method): room air        PHYSICAL EXAM:  GENERAL: In no apparent distress, well nourished, and hydrated.  HEAD:  Atraumatic, Normocephalic  EYES: EOMI, PERRLA, conjunctiva and sclera clear  ENMT: MMM  NECK: Supple  HEART: Irregular rate and rhythm  PULMONARY: Clear to auscultation and perfusion.  No rales, wheezing, or rhonchi bilaterally.  ABDOMEN: Soft, Nontender, Nondistended; Bowel sounds present  EXTREMITIES:  2+ Peripheral Pulses, No clubbing, cyanosis, or edema  NEUROLOGICAL: Grossly nonfocal      INTERPRETATION OF TELEMETRY: atrial flutter with episodes of slow ventricular response    ECG: Rhythm - atrial flutter with variable ventricular response, Rate - 50bpm, AV Conduction- WNL , Interventricular conduction - WNL, QRS duration - 66ms, Hypertrophy - Absent, Myocardial Infarction - Absent, QT interval - 458ms, WPW - Absent, Brugada pattern - Absent. Other -     I&O's Detail    2023 07:01  -  2023 07:00  --------------------------------------------------------  IN:  Total IN: 0 mL    OUT:    Voided (mL): 800 mL  Total OUT: 800 mL    Total NET: -800 mL          LABS:                        11.6   3.44  )-----------( 237      ( 2023 06:04 )             36.3     07-07    138  |  98  |  29<H>  ----------------------------<  169<H>  3.9   |  29  |  0.8    Ca    8.9      2023 06:04  Mg     1.7     07-07    TPro  6.1  /  Alb  3.3<L>  /  TBili  0.4  /  DBili  x   /  AST  25  /  ALT  35  /  AlkPhos  94  07-07          Urinalysis Basic - ( 2023 06:04 )    Color: x / Appearance: x / SG: x / pH: x  Gluc: 169 mg/dL / Ketone: x  / Bili: x / Urobili: x   Blood: x / Protein: x / Nitrite: x   Leuk Esterase: x / RBC: x / WBC x   Sq Epi: x / Non Sq Epi: x / Bacteria: x      BNP  I&O's Detail    2023 07:01  -  2023 07:00  --------------------------------------------------------  IN:  Total IN: 0 mL    OUT:    Voided (mL): 800 mL  Total OUT: 800 mL    Total NET: -800 mL        Daily     Daily Weight in k (2023 01:53)    RADIOLOGY & ADDITIONAL STUDIES:

## 2023-07-07 NOTE — PROGRESS NOTE ADULT - SUBJECTIVE AND OBJECTIVE BOX
24H events:    Patient is a 87y old Female who presents with a chief complaint of covid. Patient was found to have several episodes of bradycardia overnight, otherwise, no reported overnight events.    Primary diagnosis of COVID    Today is hospital day 3d. This morning patient was seen and examined at bedside, resting comfortably in bed.      ROS: Denies chest pain, SOB, headache, dizziness. All other symptoms are negative.    PAST MEDICAL & SURGICAL HISTORY  HTN (hypertension)    DM (diabetes mellitus)    Hypothyroidism  last blood test ~ 3months ago Spet 2022   no changes in meds    HLD (hyperlipidemia)  PT DENIES    CKD (chronic kidney disease)  stage 3A   PT DENIES    Occasional tremors  X 10 + YEARS    Nodule of right lung  s/p resection    Back pain  pain management injections dr rodriguez    Atrial flutter  PER CARDIAC NOTE    History of pulmonary hypertension    Severe pulmonary hypertension    [place] (hard of hearing)    History of lung surgery  lung nodule resection    History of hernia surgery  left inquinal    S/P LARA-BSO (total abdominal hysterectomy and bilateral salpingo-oophorectomy)    S/P LARA-BSO    S/P cataract surgery    ALLERGIES:  No Known Allergies    MEDICATIONS:  STANDING MEDICATIONS  anastrozole 1 milliGRAM(s) Oral daily  apixaban 2.5 milliGRAM(s) Oral every 12 hours  dextrose 5%. 1000 milliLiter(s) IV Continuous <Continuous>  dextrose 5%. 1000 milliLiter(s) IV Continuous <Continuous>  dextrose 50% Injectable 25 Gram(s) IV Push once  dextrose 50% Injectable 12.5 Gram(s) IV Push once  dextrose 50% Injectable 25 Gram(s) IV Push once  furosemide   Injectable 20 milliGRAM(s) IV Push daily  glucagon  Injectable 1 milliGRAM(s) IntraMuscular once  insulin glargine Injectable (LANTUS) 16 Unit(s) SubCutaneous at bedtime  insulin lispro (ADMELOG) corrective regimen sliding scale   SubCutaneous three times a day before meals  insulin lispro Injectable (ADMELOG) 3 Unit(s) SubCutaneous before breakfast  insulin lispro Injectable (ADMELOG) 3 Unit(s) SubCutaneous before lunch  insulin lispro Injectable (ADMELOG) 3 Unit(s) SubCutaneous before dinner  levothyroxine 100 MICROGram(s) Oral daily  NIFEdipine XL 60 milliGRAM(s) Oral daily    PRN MEDICATIONS  acetaminophen     Tablet .. 650 milliGRAM(s) Oral every 6 hours PRN  aluminum hydroxide/magnesium hydroxide/simethicone Suspension 30 milliLiter(s) Oral every 4 hours PRN  dextrose Oral Gel 15 Gram(s) Oral once PRN  hydrALAZINE 50 milliGRAM(s) Oral four times a day PRN  melatonin 3 milliGRAM(s) Oral at bedtime PRN  ondansetron Injectable 4 milliGRAM(s) IV Push every 8 hours PRN    VITALS:   T(F): 97.1  HR: 65  BP: 132/59  RR: 18  SpO2: 97%    PHYSICAL EXAM:  GENERAL: NAD  HEAD:  Atraumatic, Normocephalic  EYES: EOMI  NECK: Supple, No JVD  NERVOUS SYSTEM:  Alert & Oriented X3  CHEST/LUNG: Clear to auscultation bilaterally; No rales, rhonchi, wheezing, or rubs  HEART: Bradycardic; No murmurs, rubs, or gallops  ABDOMEN: Soft, Nontender, Nondistended; Bowel sounds present  EXTREMITIES:  2+ Peripheral Pulses, No clubbing, cyanosis, or edema    LABS:                        11.6   3.44  )-----------( 237      ( 07 Jul 2023 06:04 )             36.3     07-07    138  |  98  |  29<H>  ----------------------------<  169<H>  3.9   |  29  |  0.8    Ca    8.9      07 Jul 2023 06:04  Mg     1.7     07-07    TPro  6.1  /  Alb  3.3<L>  /  TBili  0.4  /  DBili  x   /  AST  25  /  ALT  35  /  AlkPhos  94  07-07      Urinalysis Basic - ( 07 Jul 2023 06:04 )    Color: x / Appearance: x / SG: x / pH: x  Gluc: 169 mg/dL / Ketone: x  / Bili: x / Urobili: x   Blood: x / Protein: x / Nitrite: x   Leuk Esterase: x / RBC: x / WBC x   Sq Epi: x / Non Sq Epi: x / Bacteria: x    Troponin T, Serum: <0.01 ng/mL (07-07-23 @ 12:59)      CARDIAC MARKERS ( 07 Jul 2023 12:59 )  x     / <0.01 ng/mL / x     / x     / x

## 2023-07-08 LAB
ANION GAP SERPL CALC-SCNC: 10 MMOL/L — SIGNIFICANT CHANGE UP (ref 7–14)
BUN SERPL-MCNC: 32 MG/DL — HIGH (ref 10–20)
CALCIUM SERPL-MCNC: 9 MG/DL — SIGNIFICANT CHANGE UP (ref 8.4–10.5)
CHLORIDE SERPL-SCNC: 97 MMOL/L — LOW (ref 98–110)
CO2 SERPL-SCNC: 31 MMOL/L — SIGNIFICANT CHANGE UP (ref 17–32)
CREAT SERPL-MCNC: 1 MG/DL — SIGNIFICANT CHANGE UP (ref 0.7–1.5)
EGFR: 55 ML/MIN/1.73M2 — LOW
GLUCOSE BLDC GLUCOMTR-MCNC: 157 MG/DL — HIGH (ref 70–99)
GLUCOSE SERPL-MCNC: 141 MG/DL — HIGH (ref 70–99)
HCT VFR BLD CALC: 39.1 % — SIGNIFICANT CHANGE UP (ref 37–47)
HGB BLD-MCNC: 12.4 G/DL — SIGNIFICANT CHANGE UP (ref 12–16)
MAGNESIUM SERPL-MCNC: 2 MG/DL — SIGNIFICANT CHANGE UP (ref 1.8–2.4)
MAGNESIUM SERPL-MCNC: 2.1 MG/DL — SIGNIFICANT CHANGE UP (ref 1.8–2.4)
MCHC RBC-ENTMCNC: 27.4 PG — SIGNIFICANT CHANGE UP (ref 27–31)
MCHC RBC-ENTMCNC: 31.7 G/DL — LOW (ref 32–37)
MCV RBC AUTO: 86.5 FL — SIGNIFICANT CHANGE UP (ref 81–99)
NRBC # BLD: 0 /100 WBCS — SIGNIFICANT CHANGE UP (ref 0–0)
PLATELET # BLD AUTO: 277 K/UL — SIGNIFICANT CHANGE UP (ref 130–400)
PMV BLD: 11.5 FL — HIGH (ref 7.4–10.4)
POTASSIUM SERPL-MCNC: 3.8 MMOL/L — SIGNIFICANT CHANGE UP (ref 3.5–5)
POTASSIUM SERPL-SCNC: 3.8 MMOL/L — SIGNIFICANT CHANGE UP (ref 3.5–5)
RBC # BLD: 4.52 M/UL — SIGNIFICANT CHANGE UP (ref 4.2–5.4)
RBC # FLD: 14.2 % — SIGNIFICANT CHANGE UP (ref 11.5–14.5)
SODIUM SERPL-SCNC: 138 MMOL/L — SIGNIFICANT CHANGE UP (ref 135–146)
WBC # BLD: 3.51 K/UL — LOW (ref 4.8–10.8)
WBC # FLD AUTO: 3.51 K/UL — LOW (ref 4.8–10.8)

## 2023-07-08 PROCEDURE — 99232 SBSQ HOSP IP/OBS MODERATE 35: CPT

## 2023-07-08 RX ADMIN — Medication 100 MICROGRAM(S): at 05:08

## 2023-07-08 RX ADMIN — Medication 2: at 07:46

## 2023-07-08 RX ADMIN — Medication 6: at 12:07

## 2023-07-08 RX ADMIN — Medication 4: at 16:56

## 2023-07-08 RX ADMIN — APIXABAN 2.5 MILLIGRAM(S): 2.5 TABLET, FILM COATED ORAL at 05:08

## 2023-07-08 RX ADMIN — Medication 3 UNIT(S): at 07:46

## 2023-07-08 RX ADMIN — INSULIN GLARGINE 16 UNIT(S): 100 INJECTION, SOLUTION SUBCUTANEOUS at 21:47

## 2023-07-08 RX ADMIN — Medication 20 MILLIGRAM(S): at 05:08

## 2023-07-08 RX ADMIN — APIXABAN 2.5 MILLIGRAM(S): 2.5 TABLET, FILM COATED ORAL at 19:02

## 2023-07-08 RX ADMIN — ANASTROZOLE 1 MILLIGRAM(S): 1 TABLET ORAL at 12:05

## 2023-07-08 RX ADMIN — Medication 3 UNIT(S): at 12:07

## 2023-07-08 RX ADMIN — Medication 3 UNIT(S): at 16:55

## 2023-07-08 RX ADMIN — Medication 60 MILLIGRAM(S): at 05:07

## 2023-07-08 NOTE — PROGRESS NOTE ADULT - ATTENDING COMMENTS
87-year-old female w/ Severe PHTN, CKD2, DM, HTN, DL, hypothyroidism, aflutter, invasive ductal carcinoma s/p lumpectomy, endometrial cancer s/p omentectomy s/p pelvic wash, left adrenal nodule presenting for hypoxia requiring oxygen at NH, malaise. She slipped and fell as well.     # Acute Hypoxic Respiratory Failure likely due to COVID  - Improving clinially, off O2, currently on RA  - s/p rdv x 3 days as per ID     # Suspected metabolic encephalopathy  -resolved    # CKD2  # DM  # HTN  # DL  # aflutter  - c/w Eliquis 2.5  - c/w metoprolol, hydralazine  - changed diet order to carb controlled; increased bedtime insulin to 16 units; added lispro 3 units TID d/w resident     #Bradycardia  EP consulted: no intervention at this time  Now with NSVT: recall EP for further input.     #Hypothyroidism  - c/w Synthroid    # Severe PHTN  # ? HF Exacerbation  - Lasix 20 IV QD  - on room air now  - ordered repeat echo; may need to consult cardiology based on echo report     # elevated AST/ALT >> Likely due to COVID  - nearly normalized    # left adrenal nodule  - outpatient MRI    # invasive ductal carcinoma s/p lumpectomy  # endometrial cancer s/p omentectomy s/p pelvic wash  # right lung nodule resection  # TAHBSO  - outpatient f/u  - c/w anastrazole    Pending: EP f/u; 2D echo; clinical improvement  Dispo: [facility]  resident updating family    follow up: on remdesevir. on room air, monitor blood glucose levels. Ajust insulin based on blood glucose levels.  Fllow echo, follow EP. Mnitor for any bradycardia.  Contct EP if worsening.

## 2023-07-08 NOTE — PROGRESS NOTE ADULT - SUBJECTIVE AND OBJECTIVE BOX
24H events:    Patient is a 87y old Female who presents with a chief complaint of covid (07 Jul 2023 18:27)    Primary diagnosis of COVID       Today is hospital day 4d. This morning patient was seen and examined at bedside, resting comfortably in bed.      PAST MEDICAL & SURGICAL HISTORY  HTN (hypertension)    DM (diabetes mellitus)    Hypothyroidism  last blood test ~ 3months ago Spet 2022   no changes in meds    HLD (hyperlipidemia)  PT DENIES    CKD (chronic kidney disease)  stage 3A   PT DENIES    Occasional tremors  X 10 + YEARS    Nodule of right lung  s/p resection    Back pain  pain management injections dr rodriguez    Atrial flutter  PER CARDIAC NOTE    History of pulmonary hypertension    Severe pulmonary hypertension    [place] (hard of hearing)    History of lung surgery  lung nodule resection    History of hernia surgery  left inquinal    S/P LARA-BSO (total abdominal hysterectomy and bilateral salpingo-oophorectomy)    S/P LARA-BSO    S/P cataract surgery      SOCIAL HISTORY:  Social History:      ALLERGIES:  No Known Allergies    MEDICATIONS:  STANDING MEDICATIONS  anastrozole 1 milliGRAM(s) Oral daily  apixaban 2.5 milliGRAM(s) Oral every 12 hours  dextrose 5%. 1000 milliLiter(s) IV Continuous <Continuous>  dextrose 5%. 1000 milliLiter(s) IV Continuous <Continuous>  dextrose 50% Injectable 25 Gram(s) IV Push once  dextrose 50% Injectable 12.5 Gram(s) IV Push once  dextrose 50% Injectable 25 Gram(s) IV Push once  furosemide   Injectable 20 milliGRAM(s) IV Push daily  glucagon  Injectable 1 milliGRAM(s) IntraMuscular once  insulin glargine Injectable (LANTUS) 16 Unit(s) SubCutaneous at bedtime  insulin lispro (ADMELOG) corrective regimen sliding scale   SubCutaneous three times a day before meals  insulin lispro Injectable (ADMELOG) 3 Unit(s) SubCutaneous before lunch  insulin lispro Injectable (ADMELOG) 3 Unit(s) SubCutaneous before dinner  insulin lispro Injectable (ADMELOG) 3 Unit(s) SubCutaneous before breakfast  levothyroxine 100 MICROGram(s) Oral daily  NIFEdipine XL 60 milliGRAM(s) Oral daily    PRN MEDICATIONS  acetaminophen     Tablet .. 650 milliGRAM(s) Oral every 6 hours PRN  aluminum hydroxide/magnesium hydroxide/simethicone Suspension 30 milliLiter(s) Oral every 4 hours PRN  dextrose Oral Gel 15 Gram(s) Oral once PRN  hydrALAZINE 50 milliGRAM(s) Oral four times a day PRN  melatonin 3 milliGRAM(s) Oral at bedtime PRN  ondansetron Injectable 4 milliGRAM(s) IV Push every 8 hours PRN    VITALS:   T(F): 96.4  HR: 68  BP: 116/68  RR: 17  SpO2: --    PHYSICAL EXAM:  GENERAL: NAD, well-groomed, well-developed  HEAD:  Atraumatic, Normocephalic  EYES: EOMI  NECK: Supple  NERVOUS SYSTEM:  Alert & Oriented X3, non focal   CHEST/LUNG: Clear to auscultation bilaterally; No rales, rhonchi, wheezing, or rubs  HEART: Regular rate and rhythm; No murmurs, rubs, or gallops  ABDOMEN: Soft, Nontender, Nondistended; Bowel sounds present  EXTREMITIES:  2+ Peripheral Pulses, No clubbing, cyanosis, or edema  LYMPH: No lymphadenopathy noted  SKIN: No rashes or lesions  LABS:                        12.4   3.51  )-----------( 277      ( 08 Jul 2023 06:28 )             39.1     07-08    138  |  97<L>  |  32<H>  ----------------------------<  141<H>  3.8   |  31  |  1.0    Ca    9.0      08 Jul 2023 06:28  Mg     2.0     07-08    TPro  6.1  /  Alb  3.3<L>  /  TBili  0.4  /  DBili  x   /  AST  25  /  ALT  35  /  AlkPhos  94  07-07      Urinalysis Basic - ( 08 Jul 2023 06:28 )    Color: x / Appearance: x / SG: x / pH: x  Gluc: 141 mg/dL / Ketone: x  / Bili: x / Urobili: x   Blood: x / Protein: x / Nitrite: x   Leuk Esterase: x / RBC: x / WBC x   Sq Epi: x / Non Sq Epi: x / Bacteria: x            CARDIAC MARKERS ( 07 Jul 2023 12:59 )  x     / <0.01 ng/mL / x     / x     / x          RADIOLOGY:           24H events:    Patient is a 87y old Female who presents with a chief complaint of covid.    Primary diagnosis of COVID    Today is hospital day 4d. This morning patient was seen and examined at bedside, resting comfortably in bed. Patient had episodes of bradycardia and unsustained V tach overnight, otherwise no overnight events. Patient was asymptomatic and had no complaints    ROS: Patient denies SOB, chest pain, nausea, dizziness. All other symptoms are negative    PAST MEDICAL & SURGICAL HISTORY:  HTN (hypertension)    DM (diabetes mellitus)    Hypothyroidism  last blood test ~ 3months ago Spet 2022   no changes in meds    HLD (hyperlipidemia)  PT DENIES    CKD (chronic kidney disease)  stage 3A   PT DENIES    Occasional tremors  X 10 + YEARS    Nodule of right lung  s/p resection    Back pain  pain management injections dr rodriguez    Atrial flutter  PER CARDIAC NOTE    History of pulmonary hypertension    Severe pulmonary hypertension    [place] (hard of hearing)    History of lung surgery  lung nodule resection    History of hernia surgery  left inquinal    S/P LARA-BSO (total abdominal hysterectomy and bilateral salpingo-oophorectomy)    S/P LARA-BSO    S/P cataract surgery    ALLERGIES:  No Known Allergies    MEDICATIONS:  STANDING MEDICATIONS  anastrozole 1 milliGRAM(s) Oral daily  apixaban 2.5 milliGRAM(s) Oral every 12 hours  dextrose 5%. 1000 milliLiter(s) IV Continuous <Continuous>  dextrose 5%. 1000 milliLiter(s) IV Continuous <Continuous>  dextrose 50% Injectable 25 Gram(s) IV Push once  dextrose 50% Injectable 12.5 Gram(s) IV Push once  dextrose 50% Injectable 25 Gram(s) IV Push once  furosemide   Injectable 20 milliGRAM(s) IV Push daily  glucagon  Injectable 1 milliGRAM(s) IntraMuscular once  insulin glargine Injectable (LANTUS) 16 Unit(s) SubCutaneous at bedtime  insulin lispro (ADMELOG) corrective regimen sliding scale   SubCutaneous three times a day before meals  insulin lispro Injectable (ADMELOG) 3 Unit(s) SubCutaneous before lunch  insulin lispro Injectable (ADMELOG) 3 Unit(s) SubCutaneous before dinner  insulin lispro Injectable (ADMELOG) 3 Unit(s) SubCutaneous before breakfast  levothyroxine 100 MICROGram(s) Oral daily  NIFEdipine XL 60 milliGRAM(s) Oral daily    PRN MEDICATIONS  acetaminophen     Tablet .. 650 milliGRAM(s) Oral every 6 hours PRN  aluminum hydroxide/magnesium hydroxide/simethicone Suspension 30 milliLiter(s) Oral every 4 hours PRN  dextrose Oral Gel 15 Gram(s) Oral once PRN  hydrALAZINE 50 milliGRAM(s) Oral four times a day PRN  melatonin 3 milliGRAM(s) Oral at bedtime PRN  ondansetron Injectable 4 milliGRAM(s) IV Push every 8 hours PRN    VITALS:   T(F): 96.4  HR: 68  BP: 116/68  RR: 17  SpO2: --    PHYSICAL EXAM:  GENERAL: NAD,  HEAD:  Atraumatic, Normocephalic  EYES: EOMI  NECK: Supple  NERVOUS SYSTEM:  Alert & Oriented X3  CHEST/LUNG: Clear to auscultation bilaterally; No rales, rhonchi, wheezing, or rubs  HEART: Regular rate and rhythm; No murmurs, rubs, or gallops  ABDOMEN: Soft, Nontender, Nondistended; Bowel sounds present  EXTREMITIES:  2+ Peripheral Pulses, No clubbing, cyanosis, or edema    LABS:                        12.4   3.51  )-----------( 277      ( 08 Jul 2023 06:28 )             39.1     07-08    138  |  97<L>  |  32<H>  ----------------------------<  141<H>  3.8   |  31  |  1.0    Ca    9.0      08 Jul 2023 06:28  Mg     2.0     07-08    TPro  6.1  /  Alb  3.3<L>  /  TBili  0.4  /  DBili  x   /  AST  25  /  ALT  35  /  AlkPhos  94  07-07    Urinalysis Basic - ( 08 Jul 2023 06:28 )    Color: x / Appearance: x / SG: x / pH: x  Gluc: 141 mg/dL / Ketone: x  / Bili: x / Urobili: x   Blood: x / Protein: x / Nitrite: x   Leuk Esterase: x / RBC: x / WBC x   Sq Epi: x / Non Sq Epi: x / Bacteria: x    CARDIAC MARKERS ( 07 Jul 2023 12:59 )  x     / <0.01 ng/mL / x     / x     / x

## 2023-07-08 NOTE — PROGRESS NOTE ADULT - NUTRITIONAL ASSESSMENT
87-year-old female w/ Severe PHTN, CKD2, DM, HTN, DL, hypothyroidism, aflutter, invasive ductal carcinoma s/p lumpectomy, endometrial cancer s/p omentectomy s/p pelvic wash, left adrenal nodule presenting for hypoxia requiring oxygen at NH, malaise. She slipped and fell as well. Patient had episodes of bradycardia and unsustained Vtach overnight. Patient was seen and examined, was found asymptomatic. F/U w/ EP if V tach continues. As per EP, Contact electrophysiology if the patient continues to have slow ventricular response after the patient has recovered from her infection as well as experiencing symptoms related to her bradycardia.     # Acute Hypoxic Respiratory Failure likely due to COVID  - Improving clinially, off O2, currently on RA  - rdv x 3 days as per ID; patient is currently on day 3 and has completed her duration today    # Suspected metabolic encephalopathy  - resolving     # CKD2  # DM  # HTN  # DL  # aflutter  - c/w Eliquis 2.5  - c/w hydralazine  - metoprolol was d/c due to bradycardia episodes  - EP recomended the following:  - Continue to monitor the patient  - Treat the underlying her for COVID-19  - Anticoagulation: CHADS-VASC score of 5 on Eliquis  - Contact electrophysiology if the patient continues to have slow ventricular response after the patient has recovered from her infection as well as experiencing symptoms related to her bradycardia.     #Elevated BNP  - Still pending echo completion, F/U when finished    # hypothyroidism  - c/w Synthroid    # Severe PHTN  # ? HF Exacerbation  - Lasix 20 IV QD  - on room air now    # elevated AST/ALT >> Likely due to COVID  nearly normalized    # left adrenal nodule  - outpatient MRI    # invasive ductal carcinoma s/p lumpectomy  # endometrial cancer s/p omentectomy s/p pelvic wash  # right lung nodule resection  # TAHBSO  - outpatient f/u  - c/w anastrazole

## 2023-07-09 LAB
ANION GAP SERPL CALC-SCNC: 11 MMOL/L — SIGNIFICANT CHANGE UP (ref 7–14)
BUN SERPL-MCNC: 42 MG/DL — HIGH (ref 10–20)
CALCIUM SERPL-MCNC: 9 MG/DL — SIGNIFICANT CHANGE UP (ref 8.4–10.5)
CHLORIDE SERPL-SCNC: 98 MMOL/L — SIGNIFICANT CHANGE UP (ref 98–110)
CO2 SERPL-SCNC: 30 MMOL/L — SIGNIFICANT CHANGE UP (ref 17–32)
CREAT SERPL-MCNC: 1.1 MG/DL — SIGNIFICANT CHANGE UP (ref 0.7–1.5)
EGFR: 49 ML/MIN/1.73M2 — LOW
GLUCOSE BLDC GLUCOMTR-MCNC: 243 MG/DL — HIGH (ref 70–99)
GLUCOSE BLDC GLUCOMTR-MCNC: 245 MG/DL — HIGH (ref 70–99)
GLUCOSE BLDC GLUCOMTR-MCNC: 269 MG/DL — HIGH (ref 70–99)
GLUCOSE BLDC GLUCOMTR-MCNC: 309 MG/DL — HIGH (ref 70–99)
GLUCOSE SERPL-MCNC: 154 MG/DL — HIGH (ref 70–99)
HCT VFR BLD CALC: 40.8 % — SIGNIFICANT CHANGE UP (ref 37–47)
HGB BLD-MCNC: 13.1 G/DL — SIGNIFICANT CHANGE UP (ref 12–16)
MAGNESIUM SERPL-MCNC: 1.9 MG/DL — SIGNIFICANT CHANGE UP (ref 1.8–2.4)
MCHC RBC-ENTMCNC: 28 PG — SIGNIFICANT CHANGE UP (ref 27–31)
MCHC RBC-ENTMCNC: 32.1 G/DL — SIGNIFICANT CHANGE UP (ref 32–37)
MCV RBC AUTO: 87.2 FL — SIGNIFICANT CHANGE UP (ref 81–99)
NRBC # BLD: 0 /100 WBCS — SIGNIFICANT CHANGE UP (ref 0–0)
PLATELET # BLD AUTO: 303 K/UL — SIGNIFICANT CHANGE UP (ref 130–400)
PMV BLD: 11.2 FL — HIGH (ref 7.4–10.4)
POTASSIUM SERPL-MCNC: 4.6 MMOL/L — SIGNIFICANT CHANGE UP (ref 3.5–5)
POTASSIUM SERPL-SCNC: 4.6 MMOL/L — SIGNIFICANT CHANGE UP (ref 3.5–5)
RBC # BLD: 4.68 M/UL — SIGNIFICANT CHANGE UP (ref 4.2–5.4)
RBC # FLD: 14.4 % — SIGNIFICANT CHANGE UP (ref 11.5–14.5)
SODIUM SERPL-SCNC: 139 MMOL/L — SIGNIFICANT CHANGE UP (ref 135–146)
WBC # BLD: 5.74 K/UL — SIGNIFICANT CHANGE UP (ref 4.8–10.8)
WBC # FLD AUTO: 5.74 K/UL — SIGNIFICANT CHANGE UP (ref 4.8–10.8)

## 2023-07-09 PROCEDURE — 99232 SBSQ HOSP IP/OBS MODERATE 35: CPT

## 2023-07-09 RX ADMIN — Medication 4: at 16:46

## 2023-07-09 RX ADMIN — APIXABAN 2.5 MILLIGRAM(S): 2.5 TABLET, FILM COATED ORAL at 05:21

## 2023-07-09 RX ADMIN — Medication 3 UNIT(S): at 11:51

## 2023-07-09 RX ADMIN — Medication 2: at 08:12

## 2023-07-09 RX ADMIN — Medication 20 MILLIGRAM(S): at 05:20

## 2023-07-09 RX ADMIN — ANASTROZOLE 1 MILLIGRAM(S): 1 TABLET ORAL at 11:24

## 2023-07-09 RX ADMIN — Medication 100 MICROGRAM(S): at 05:21

## 2023-07-09 RX ADMIN — INSULIN GLARGINE 16 UNIT(S): 100 INJECTION, SOLUTION SUBCUTANEOUS at 21:43

## 2023-07-09 RX ADMIN — APIXABAN 2.5 MILLIGRAM(S): 2.5 TABLET, FILM COATED ORAL at 17:49

## 2023-07-09 RX ADMIN — Medication 6: at 11:50

## 2023-07-09 RX ADMIN — Medication 60 MILLIGRAM(S): at 05:21

## 2023-07-09 RX ADMIN — Medication 3 UNIT(S): at 16:47

## 2023-07-09 RX ADMIN — Medication 3 UNIT(S): at 08:12

## 2023-07-09 NOTE — PROGRESS NOTE ADULT - ASSESSMENT
87-year-old female w/ Severe PHTN, CKD2, DM, HTN, DL, hypothyroidism, aflutter, invasive ductal carcinoma s/p lumpectomy, endometrial cancer s/p omentectomy s/p pelvic wash, left adrenal nodule presenting for hypoxia requiring oxygen at NH, malaise. She slipped and fell as well.     # Acute Hypoxic Respiratory Failure likely due to COVID  - Improving clinially, off O2, currently on RA  - s/p rdv x 3 days as per ID     # Suspected metabolic encephalopathy  -resolved    # DM  # HTN  # DL  # aflutter  - c/w Eliquis 2.5  - c/w metoprolol, hydralazine  - changed diet order to carb controlled; increased bedtime insulin to 16 units; added lispro 3 units TID d/w resident     #Bradycardia  EP consulted: no intervention at this time  Now with NSVT: recall EP for further input.     #Hypothyroidism  - c/w Synthroid    # Severe PHTN  # ? HF Exacerbation  - Lasix 20 IV QD  - on room air now  - ordered repeat echo; may need to consult cardiology based on echo report     # elevated AST/ALT >> Likely due to COVID  - nearly normalized    # left adrenal nodule  - outpatient MRI    # invasive ductal carcinoma s/p lumpectomy  # endometrial cancer s/p omentectomy s/p pelvic wash  # right lung nodule resection  # TAHBSO  - outpatient f/u  - c/w anastrazole    Pending: EP f/u; 2D echo; clinical improvement  Dispo: Sabrina ARNOLD

## 2023-07-09 NOTE — PROGRESS NOTE ADULT - SUBJECTIVE AND OBJECTIVE BOX
INTERVAL HPI/OVERNIGHT EVENTS:    87-year-old female w/ Severe PHTN, CKD2, DM, HTN, DL, hypothyroidism, aflutter, invasive ductal carcinoma s/p lumpectomy, endometrial cancer s/p omentectomy s/p pelvic wash, left adrenal nodule presenting for hypoxia requiring oxygen at NH, malaise. She slipped and fell as well.   feels better today      REVIEW OF SYSTEMS:  CONSTITUTIONAL: No fever, weight loss, or fatigue  EYES: No eye pain, visual disturbances, or discharge  ENMT:  No difficulty hearing, tinnitus, vertigo; No sinus or throat pain  NECK: No pain or stiffness  BREASTS: No pain, masses, or nipple discharge  RESPIRATORY: No cough, wheezing, chills or hemoptysis; No shortness of breath  CARDIOVASCULAR: No chest pain, palpitations, dizziness, or leg swelling  GASTROINTESTINAL: No abdominal or epigastric pain. No nausea, vomiting, or hematemesis; No diarrhea or constipation. No melena or hematochezia.  GENITOURINARY: No dysuria, frequency, hematuria, or incontinence  NEUROLOGICAL: No headaches, memory loss, loss of strength, numbness, or tremors  SKIN: No itching, burning, rashes, or lesions   LYMPH NODES: No enlarged glands  ENDOCRINE: No heat or cold intolerance; No hair loss  MUSCULOSKELETAL: No joint pain or swelling; No muscle, back, or extremity pain  PSYCHIATRIC: No depression, anxiety, mood swings, or difficulty sleeping  HEME/LYMPH: No easy bruising, or bleeding gums  ALLERY AND IMMUNOLOGIC: No hives or eczema    VITALS:  T(F): 98.6, Max: 98.6 (07-09-23 @ 13:22)  HR: 66  BP: 128/62  RR: 18  SpO2: --    PHYSICAL EXAM:  GENERAL: NAD,   HEAD:  Atraumatic, Normocephalic  EYES: EOMI, PERRLA, conjunctiva and sclera clear  ENMT: No tonsillar erythema, exudates, or enlargement; Moist mucous membranes, Good dentition, No lesions  NECK: Supple, No JVD, Normal thyroid  NERVOUS SYSTEM:  Alert & Oriented X3,  CHEST/LUNG: decrease breath sounds at bases  HEART: Regular rate and rhythm; No murmurs, rubs, or gallops  ABDOMEN: Soft, Nontender, Nondistended; Bowel sounds present  EXTREMITIES:  2+ Peripheral Pulses, No clubbing, cyanosis, or edema  LYMPH: No lymphadenopathy noted  SKIN: No rashes or lesions      LABS:  Urinalysis Basic - ( 09 Jul 2023 06:32 )    Color: x / Appearance: x / SG: x / pH: x  Gluc: 154 mg/dL / Ketone: x  / Bili: x / Urobili: x   Blood: x / Protein: x / Nitrite: x   Leuk Esterase: x / RBC: x / WBC x   Sq Epi: x / Non Sq Epi: x / Bacteria: x      07-09    139  |  98  |  42<H>  ----------------------------<  154<H>  4.6   |  30  |  1.1    Ca    9.0      09 Jul 2023 06:32  Mg     1.9     07-09                            13.1   5.74  )-----------( 303      ( 09 Jul 2023 06:32 )             40.8           RADIOLOGY & ADDITIONAL TESTS:    Imaging Personally Reviewed:  [ ] YES  [ ] NO    MEDICATIONS:     MEDICATIONS  (STANDING):  anastrozole 1 milliGRAM(s) Oral daily  apixaban 2.5 milliGRAM(s) Oral every 12 hours  dextrose 5%. 1000 milliLiter(s) (100 mL/Hr) IV Continuous <Continuous>  dextrose 5%. 1000 milliLiter(s) (50 mL/Hr) IV Continuous <Continuous>  dextrose 50% Injectable 25 Gram(s) IV Push once  dextrose 50% Injectable 12.5 Gram(s) IV Push once  dextrose 50% Injectable 25 Gram(s) IV Push once  furosemide   Injectable 20 milliGRAM(s) IV Push daily  glucagon  Injectable 1 milliGRAM(s) IntraMuscular once  insulin glargine Injectable (LANTUS) 16 Unit(s) SubCutaneous at bedtime  insulin lispro (ADMELOG) corrective regimen sliding scale   SubCutaneous three times a day before meals  insulin lispro Injectable (ADMELOG) 3 Unit(s) SubCutaneous before breakfast  insulin lispro Injectable (ADMELOG) 3 Unit(s) SubCutaneous before lunch  insulin lispro Injectable (ADMELOG) 3 Unit(s) SubCutaneous before dinner  levothyroxine 100 MICROGram(s) Oral daily  NIFEdipine XL 60 milliGRAM(s) Oral daily    MEDICATIONS  (PRN):  acetaminophen     Tablet .. 650 milliGRAM(s) Oral every 6 hours PRN Temp greater or equal to 38C (100.4F), Mild Pain (1 - 3)  aluminum hydroxide/magnesium hydroxide/simethicone Suspension 30 milliLiter(s) Oral every 4 hours PRN Dyspepsia  dextrose Oral Gel 15 Gram(s) Oral once PRN Blood Glucose LESS THAN 70 milliGRAM(s)/deciliter  hydrALAZINE 50 milliGRAM(s) Oral four times a day PRN Systolic blood pressure > 140  melatonin 3 milliGRAM(s) Oral at bedtime PRN Insomnia  ondansetron Injectable 4 milliGRAM(s) IV Push every 8 hours PRN Nausea and/or Vomiting

## 2023-07-10 LAB
ANION GAP SERPL CALC-SCNC: 11 MMOL/L — SIGNIFICANT CHANGE UP (ref 7–14)
BUN SERPL-MCNC: 43 MG/DL — HIGH (ref 10–20)
CALCIUM SERPL-MCNC: 9.5 MG/DL — SIGNIFICANT CHANGE UP (ref 8.4–10.5)
CHLORIDE SERPL-SCNC: 99 MMOL/L — SIGNIFICANT CHANGE UP (ref 98–110)
CO2 SERPL-SCNC: 29 MMOL/L — SIGNIFICANT CHANGE UP (ref 17–32)
CREAT SERPL-MCNC: 1 MG/DL — SIGNIFICANT CHANGE UP (ref 0.7–1.5)
CULTURE RESULTS: SIGNIFICANT CHANGE UP
CULTURE RESULTS: SIGNIFICANT CHANGE UP
EGFR: 55 ML/MIN/1.73M2 — LOW
GLUCOSE BLDC GLUCOMTR-MCNC: 109 MG/DL — HIGH (ref 70–99)
GLUCOSE BLDC GLUCOMTR-MCNC: 165 MG/DL — HIGH (ref 70–99)
GLUCOSE BLDC GLUCOMTR-MCNC: 175 MG/DL — HIGH (ref 70–99)
GLUCOSE BLDC GLUCOMTR-MCNC: 223 MG/DL — HIGH (ref 70–99)
GLUCOSE SERPL-MCNC: 180 MG/DL — HIGH (ref 70–99)
HCT VFR BLD CALC: 40.3 % — SIGNIFICANT CHANGE UP (ref 37–47)
HGB BLD-MCNC: 13 G/DL — SIGNIFICANT CHANGE UP (ref 12–16)
MAGNESIUM SERPL-MCNC: 1.9 MG/DL — SIGNIFICANT CHANGE UP (ref 1.8–2.4)
MCHC RBC-ENTMCNC: 27.8 PG — SIGNIFICANT CHANGE UP (ref 27–31)
MCHC RBC-ENTMCNC: 32.3 G/DL — SIGNIFICANT CHANGE UP (ref 32–37)
MCV RBC AUTO: 86.1 FL — SIGNIFICANT CHANGE UP (ref 81–99)
NRBC # BLD: 0 /100 WBCS — SIGNIFICANT CHANGE UP (ref 0–0)
PLATELET # BLD AUTO: 299 K/UL — SIGNIFICANT CHANGE UP (ref 130–400)
PMV BLD: 10.9 FL — HIGH (ref 7.4–10.4)
POTASSIUM SERPL-MCNC: 4.5 MMOL/L — SIGNIFICANT CHANGE UP (ref 3.5–5)
POTASSIUM SERPL-SCNC: 4.5 MMOL/L — SIGNIFICANT CHANGE UP (ref 3.5–5)
RBC # BLD: 4.68 M/UL — SIGNIFICANT CHANGE UP (ref 4.2–5.4)
RBC # FLD: 14.3 % — SIGNIFICANT CHANGE UP (ref 11.5–14.5)
SODIUM SERPL-SCNC: 139 MMOL/L — SIGNIFICANT CHANGE UP (ref 135–146)
SPECIMEN SOURCE: SIGNIFICANT CHANGE UP
SPECIMEN SOURCE: SIGNIFICANT CHANGE UP
WBC # BLD: 7.04 K/UL — SIGNIFICANT CHANGE UP (ref 4.8–10.8)
WBC # FLD AUTO: 7.04 K/UL — SIGNIFICANT CHANGE UP (ref 4.8–10.8)

## 2023-07-10 PROCEDURE — 99233 SBSQ HOSP IP/OBS HIGH 50: CPT

## 2023-07-10 RX ORDER — CHLORHEXIDINE GLUCONATE 213 G/1000ML
1 SOLUTION TOPICAL DAILY
Refills: 0 | Status: DISCONTINUED | OUTPATIENT
Start: 2023-07-10 | End: 2023-07-11

## 2023-07-10 RX ADMIN — Medication 20 MILLIGRAM(S): at 05:13

## 2023-07-10 RX ADMIN — APIXABAN 2.5 MILLIGRAM(S): 2.5 TABLET, FILM COATED ORAL at 05:13

## 2023-07-10 RX ADMIN — Medication 60 MILLIGRAM(S): at 05:13

## 2023-07-10 RX ADMIN — APIXABAN 2.5 MILLIGRAM(S): 2.5 TABLET, FILM COATED ORAL at 17:33

## 2023-07-10 RX ADMIN — Medication 3 UNIT(S): at 08:13

## 2023-07-10 RX ADMIN — INSULIN GLARGINE 16 UNIT(S): 100 INJECTION, SOLUTION SUBCUTANEOUS at 21:42

## 2023-07-10 RX ADMIN — Medication 4: at 11:56

## 2023-07-10 RX ADMIN — ANASTROZOLE 1 MILLIGRAM(S): 1 TABLET ORAL at 11:56

## 2023-07-10 RX ADMIN — Medication 2: at 17:32

## 2023-07-10 RX ADMIN — Medication 100 MICROGRAM(S): at 05:13

## 2023-07-10 RX ADMIN — Medication 3 UNIT(S): at 17:32

## 2023-07-10 RX ADMIN — Medication 2: at 08:13

## 2023-07-10 RX ADMIN — Medication 3 UNIT(S): at 11:57

## 2023-07-10 NOTE — PROGRESS NOTE ADULT - SUBJECTIVE AND OBJECTIVE BOX
24H events:    Patient is a 87y old Female who presents with a chief complaint of covid (09 Jul 2023 15:04)    Primary diagnosis of COVID       Today is hospital day 6d.      Patient seen and examined at bedside. Reports no active complaints.     PAST MEDICAL & SURGICAL HISTORY  HTN (hypertension)    DM (diabetes mellitus)    Hypothyroidism  last blood test ~ 3months ago Spet 2022   no changes in meds    HLD (hyperlipidemia)  PT DENIES    CKD (chronic kidney disease)  stage 3A   PT DENIES    Occasional tremors  X 10 + YEARS    Nodule of right lung  s/p resection    Back pain  pain management injections dr rodriguez    Atrial flutter  PER CARDIAC NOTE    History of pulmonary hypertension    Severe pulmonary hypertension    [place] (hard of hearing)    History of lung surgery  lung nodule resection    History of hernia surgery  left inquinal    S/P LARA-BSO (total abdominal hysterectomy and bilateral salpingo-oophorectomy)    S/P LARA-BSO    S/P cataract surgery      SOCIAL HISTORY:  Negative for smoking/alcohol/drug use.     ALLERGIES:  No Known Allergies    MEDICATIONS:  STANDING MEDICATIONS  anastrozole 1 milliGRAM(s) Oral daily  apixaban 2.5 milliGRAM(s) Oral every 12 hours  chlorhexidine 2% Cloths 1 Application(s) Topical daily  dextrose 5%. 1000 milliLiter(s) IV Continuous <Continuous>  dextrose 5%. 1000 milliLiter(s) IV Continuous <Continuous>  dextrose 50% Injectable 25 Gram(s) IV Push once  dextrose 50% Injectable 12.5 Gram(s) IV Push once  dextrose 50% Injectable 25 Gram(s) IV Push once  furosemide   Injectable 20 milliGRAM(s) IV Push daily  glucagon  Injectable 1 milliGRAM(s) IntraMuscular once  insulin glargine Injectable (LANTUS) 16 Unit(s) SubCutaneous at bedtime  insulin lispro (ADMELOG) corrective regimen sliding scale   SubCutaneous three times a day before meals  insulin lispro Injectable (ADMELOG) 3 Unit(s) SubCutaneous before breakfast  insulin lispro Injectable (ADMELOG) 3 Unit(s) SubCutaneous before lunch  insulin lispro Injectable (ADMELOG) 3 Unit(s) SubCutaneous before dinner  levothyroxine 100 MICROGram(s) Oral daily  NIFEdipine XL 60 milliGRAM(s) Oral daily    PRN MEDICATIONS  acetaminophen     Tablet .. 650 milliGRAM(s) Oral every 6 hours PRN  aluminum hydroxide/magnesium hydroxide/simethicone Suspension 30 milliLiter(s) Oral every 4 hours PRN  dextrose Oral Gel 15 Gram(s) Oral once PRN  hydrALAZINE 50 milliGRAM(s) Oral four times a day PRN  melatonin 3 milliGRAM(s) Oral at bedtime PRN  ondansetron Injectable 4 milliGRAM(s) IV Push every 8 hours PRN    VITALS:   T(F): 96.3  HR: 58  BP: 157/72  RR: 18  SpO2: 97%    LABS:                        13.0   7.04  )-----------( 299      ( 10 Jul 2023 06:40 )             40.3     07-10    139  |  99  |  43<H>  ----------------------------<  180<H>  4.5   |  29  |  1.0    Ca    9.5      10 Jul 2023 06:40  Mg     1.9     07-10        Urinalysis Basic - ( 10 Jul 2023 06:40 )    Color: x / Appearance: x / SG: x / pH: x  Gluc: 180 mg/dL / Ketone: x  / Bili: x / Urobili: x   Blood: x / Protein: x / Nitrite: x   Leuk Esterase: x / RBC: x / WBC x   Sq Epi: x / Non Sq Epi: x / Bacteria: x                RADIOLOGY:    PHYSICAL EXAM:      GENERAL: NAD,   HEAD:  Atraumatic, Normocephalic  EYES: EOMI, PERRLA, conjunctiva and sclera clear  ENMT: No tonsillar erythema, exudates, or enlargement; Moist mucous membranes, Good dentition, No lesions  NECK: Supple, No JVD, Normal thyroid  NERVOUS SYSTEM:  Alert & Oriented X3,  CHEST/LUNG: decrease breath sounds at bases  HEART: Regular rate and rhythm; No murmurs, rubs, or gallops  ABDOMEN: Soft, Nontender, Nondistended; Bowel sounds present  EXTREMITIES:  2+ Peripheral Pulses, No clubbing, cyanosis, or edema

## 2023-07-10 NOTE — PROGRESS NOTE ADULT - ASSESSMENT
87-year-old female w/ Severe PHTN, CKD  2, DM, HTN, DL, hypothyroidism, aflutter, invasive ductal carcinoma s/p lumpectomy, endometrial cancer s/p omentectomy s/p pelvic wash, left adrenal nodule presenting for hypoxia requiring oxygen at NH, malaise. She slipped and fell as well. Trauma w/u -ve (XR Pelvis, CT H&N) the patient was noted to be COVID (+) with acute respiratory failure with hypoxia, severe pulmonary hypertension and known chronic atrial flutter atrial fibrillation. The patient was noted on tele to have bradycardia for which electrophysiology was consulted. Tele was reviewed revealing intermittent episodes of atrial flutter with slow ventricular response likely driven by vagal tone giving her current condition and illness state. She has normal left ventricular systolic function with severely dilated LA, narrow complex qrs. Patient now noted to have nsvt on tele ep reconsulted.       impression:   atrial flutter with slow ventricular response    tele reviewed with Dr Caba no true NSVT , artifact present most likely do to patient tremors.   cont with tele   fu TTE   treat underlying covid 19   continue with ac Eliquis 2.5 mg PO BID black vasc 5.   Recall if pt has pauses > 5 sec or has symptomatic jemima or true nsvt  EP will cont to follow.

## 2023-07-10 NOTE — PROGRESS NOTE ADULT - SUBJECTIVE AND OBJECTIVE BOX
Ms Nicole was seen and examined this afternoon. Contacted by the primary team for NSVT on tele. patient denies any chest pain palpitations or sob.     INTERVAL HPI/OVERNIGHT EVENTS:    MEDICATIONS  (STANDING):  anastrozole 1 milliGRAM(s) Oral daily  apixaban 2.5 milliGRAM(s) Oral every 12 hours  chlorhexidine 2% Cloths 1 Application(s) Topical daily  dextrose 5%. 1000 milliLiter(s) (100 mL/Hr) IV Continuous <Continuous>  dextrose 5%. 1000 milliLiter(s) (50 mL/Hr) IV Continuous <Continuous>  dextrose 50% Injectable 25 Gram(s) IV Push once  dextrose 50% Injectable 12.5 Gram(s) IV Push once  dextrose 50% Injectable 25 Gram(s) IV Push once  furosemide   Injectable 20 milliGRAM(s) IV Push daily  glucagon  Injectable 1 milliGRAM(s) IntraMuscular once  insulin glargine Injectable (LANTUS) 16 Unit(s) SubCutaneous at bedtime  insulin lispro (ADMELOG) corrective regimen sliding scale   SubCutaneous three times a day before meals  insulin lispro Injectable (ADMELOG) 3 Unit(s) SubCutaneous before breakfast  insulin lispro Injectable (ADMELOG) 3 Unit(s) SubCutaneous before lunch  insulin lispro Injectable (ADMELOG) 3 Unit(s) SubCutaneous before dinner  levothyroxine 100 MICROGram(s) Oral daily  NIFEdipine XL 60 milliGRAM(s) Oral daily    MEDICATIONS  (PRN):  acetaminophen     Tablet .. 650 milliGRAM(s) Oral every 6 hours PRN Temp greater or equal to 38C (100.4F), Mild Pain (1 - 3)  aluminum hydroxide/magnesium hydroxide/simethicone Suspension 30 milliLiter(s) Oral every 4 hours PRN Dyspepsia  dextrose Oral Gel 15 Gram(s) Oral once PRN Blood Glucose LESS THAN 70 milliGRAM(s)/deciliter  hydrALAZINE 50 milliGRAM(s) Oral four times a day PRN Systolic blood pressure > 140  melatonin 3 milliGRAM(s) Oral at bedtime PRN Insomnia  ondansetron Injectable 4 milliGRAM(s) IV Push every 8 hours PRN Nausea and/or Vomiting      Allergies    No Known Allergies    Intolerances        REVIEW OF SYSTEMS    negative except as noted in hpi     Allergic/Immunologic:	    Vital Signs Last 24 Hrs  T(C): 35.6 (10 Jul 2023 13:17), Max: 36.2 (09 Jul 2023 20:16)  T(F): 96 (10 Jul 2023 13:17), Max: 97.1 (09 Jul 2023 20:16)  HR: 65 (10 Jul 2023 13:17) (58 - 65)  BP: 128/62 (10 Jul 2023 13:17) (128/62 - 157/72)  BP(mean): --  RR: 18 (10 Jul 2023 13:17) (18 - 18)  SpO2: --        07-09-23 @ 07:01  -  07-10-23 @ 07:00  --------------------------------------------------------  IN: 1193 mL / OUT: 2200 mL / NET: -1007 mL    07-10-23 @ 07:01  -  07-10-23 @ 18:58  --------------------------------------------------------  IN: 1172 mL / OUT: 1350 mL / NET: -178 mL        Physical Exam    GENERAL: NAD lying in bed   HEAD:  Atraumatic, Normocephalic  EYES: EOMI, PERRLA, conjunctiva and sclera clear  NECK: Supple and normal thyroid.  No JVD or carotid bruit.  Carotid pulse is 2+ bilaterally.  HEART: irRegular rate and rhythm;   PULMONARY: Clear to auscultation and perfusion.  No rales, wheezing, or rhonchi bilaterally.  ABDOMEN: Soft, Nontender, Nondistended; Bowel sounds present  EXTREMITIES:  2+ Peripheral Pulses, No clubbing, cyanosis, or edema  LYMPH: No lymphadenopathy noted  NEUROLOGICAL:  + resting tremor in upper ext.  no neuro deficit.       LABS:                        13.0   7.04  )-----------( 299      ( 10 Jul 2023 06:40 )             40.3     07-10    139  |  99  |  43<H>  ----------------------------<  180<H>  4.5   |  29  |  1.0    Ca    9.5      10 Jul 2023 06:40  Mg     1.9     07-10        Urinalysis Basic - ( 10 Jul 2023 06:40 )    Color: x / Appearance: x / SG: x / pH: x  Gluc: 180 mg/dL / Ketone: x  / Bili: x / Urobili: x   Blood: x / Protein: x / Nitrite: x   Leuk Esterase: x / RBC: x / WBC x   Sq Epi: x / Non Sq Epi: x / Bacteria: x        RADIOLOGY & ADDITIONAL TESTS:

## 2023-07-10 NOTE — PROGRESS NOTE ADULT - SUBJECTIVE AND OBJECTIVE BOX
LORETTA SALCEDO  87y  [facility] [address]      Patient is a 87y old  Female who presents with a chief complaint of covid (10 Jul 2023 12:50)      My note supersedes the resident's note      INTERVAL HPI/OVERNIGHT EVENTS:  no acute events overnight       REVIEW OF SYSTEMS:  CONSTITUTIONAL: No fever, weight loss, or fatigue  EYES: No eye pain, visual disturbances, or discharge  ENMT:  No difficulty hearing, tinnitus, vertigo; No sinus or throat pain  NECK: No pain or stiffness  BREASTS: No pain, masses, or nipple discharge  RESPIRATORY: No cough, wheezing, chills or hemoptysis; No shortness of breath  CARDIOVASCULAR: No chest pain, palpitations, dizziness, or leg swelling  GASTROINTESTINAL: No abdominal or epigastric pain. No nausea, vomiting, or hematemesis; No diarrhea or constipation. No melena or hematochezia.  GENITOURINARY: No dysuria, frequency, hematuria, or incontinence  NEUROLOGICAL: No headaches, memory loss, loss of strength, numbness, or tremors  SKIN: No itching, burning, rashes, or lesions   LYMPH NODES: No enlarged glands  ENDOCRINE: No heat or cold intolerance; No hair loss  MUSCULOSKELETAL: No joint pain or swelling; No muscle, back, or extremity pain  PSYCHIATRIC: No depression, anxiety, mood swings, or difficulty sleeping  HEME/LYMPH: No easy bruising, or bleeding gums  ALLERY AND IMMUNOLOGIC: No hives or eczema  FAMILY HISTORY:  Known health problems: none      T(C): 35.6 (07-10-23 @ 13:17), Max: 36.2 (07-09-23 @ 20:16)  HR: 65 (07-10-23 @ 13:17) (58 - 65)  BP: 128/62 (07-10-23 @ 13:17) (128/62 - 157/72)  RR: 18 (07-10-23 @ 13:17) (18 - 18)  SpO2: --  Wt(kg): --Vital Signs Last 24 Hrs  T(C): 35.6 (10 Jul 2023 13:17), Max: 36.2 (09 Jul 2023 20:16)  T(F): 96 (10 Jul 2023 13:17), Max: 97.1 (09 Jul 2023 20:16)  HR: 65 (10 Jul 2023 13:17) (58 - 65)  BP: 128/62 (10 Jul 2023 13:17) (128/62 - 157/72)  BP(mean): --  RR: 18 (10 Jul 2023 13:17) (18 - 18)  SpO2: --        PHYSICAL EXAM:  GENERAL: NAD, well-groomed, well-developed  HEAD:  Atraumatic, Normocephalic  EYES: EOMI, PERRLA, conjunctiva and sclera clear  ENMT: No tonsillar erythema, exudates, or enlargement; Moist mucous membranes, Good dentition, No lesions  NECK: Supple, No JVD, Normal thyroid  NERVOUS SYSTEM:  Alert & Oriented X3, Good concentration; Motor Strength 5/5 B/L upper and lower extremities; DTRs 2+ intact and symmetric  PULM: Clear to auscultation bilaterally  CARDIAC: Regular rate and rhythm; No murmurs, rubs, or gallops  GI: Soft, Nontender, Nondistended; Bowel sounds present  EXTREMITIES:  2+ Peripheral Pulses, No clubbing, cyanosis, or edema  LYMPH: No lymphadenopathy noted  SKIN: No rashes or lesions    Consultant(s) Notes Reviewed:  [x ] YES  [ ] NO  Care Discussed with Consultants/Other Providers [ x] YES  [ ] NO    LABS:                            13.0   7.04  )-----------( 299      ( 10 Jul 2023 06:40 )             40.3   07-10    139  |  99  |  43<H>  ----------------------------<  180<H>  4.5   |  29  |  1.0    Ca    9.5      10 Jul 2023 06:40  Mg     1.9     07-10              acetaminophen     Tablet .. 650 milliGRAM(s) Oral every 6 hours PRN  aluminum hydroxide/magnesium hydroxide/simethicone Suspension 30 milliLiter(s) Oral every 4 hours PRN  anastrozole 1 milliGRAM(s) Oral daily  apixaban 2.5 milliGRAM(s) Oral every 12 hours  chlorhexidine 2% Cloths 1 Application(s) Topical daily  dextrose 5%. 1000 milliLiter(s) IV Continuous <Continuous>  dextrose 5%. 1000 milliLiter(s) IV Continuous <Continuous>  dextrose 50% Injectable 25 Gram(s) IV Push once  dextrose 50% Injectable 12.5 Gram(s) IV Push once  dextrose 50% Injectable 25 Gram(s) IV Push once  dextrose Oral Gel 15 Gram(s) Oral once PRN  furosemide   Injectable 20 milliGRAM(s) IV Push daily  glucagon  Injectable 1 milliGRAM(s) IntraMuscular once  hydrALAZINE 50 milliGRAM(s) Oral four times a day PRN  insulin glargine Injectable (LANTUS) 16 Unit(s) SubCutaneous at bedtime  insulin lispro (ADMELOG) corrective regimen sliding scale   SubCutaneous three times a day before meals  insulin lispro Injectable (ADMELOG) 3 Unit(s) SubCutaneous before lunch  insulin lispro Injectable (ADMELOG) 3 Unit(s) SubCutaneous before dinner  insulin lispro Injectable (ADMELOG) 3 Unit(s) SubCutaneous before breakfast  levothyroxine 100 MICROGram(s) Oral daily  melatonin 3 milliGRAM(s) Oral at bedtime PRN  NIFEdipine XL 60 milliGRAM(s) Oral daily  ondansetron Injectable 4 milliGRAM(s) IV Push every 8 hours PRN      HEALTH ISSUES - PROBLEM Dx:          Case Discussed with House Staff   55 minutes spent on total encounter; more than 50% of the visit was spent counseling and/or coordinating care by the attending physician.    Spectra [phone]

## 2023-07-10 NOTE — PROGRESS NOTE ADULT - ASSESSMENT
87-year-old female w/ Severe PHTN, CKD2, DM, HTN, DL, hypothyroidism, aflutter, invasive ductal carcinoma s/p lumpectomy, endometrial cancer s/p omentectomy s/p pelvic wash, left adrenal nodule presenting for hypoxia requiring oxygen at NH, malaise. She slipped and fell as well.     # Acute Hypoxic Respiratory Failure likely due to COVID  - Improving clinially, off O2, currently on RA  - s/p rdv x 3 days as per ID     # Suspected metabolic encephalopathy  -resolved    # DM  # HTN  # DL  # aflutter  - c/w Eliquis 2.5  - c/w metoprolol, hydralazine  - changed diet order to carb controlled; increased bedtime insulin to 16 units; added lispro 3 units TID d/w resident     #Bradycardia  EP consulted: no intervention at this time  Now with NSVT: recall EP for further input.     #Hypothyroidism  - c/w Synthroid    # Severe PHTN  # ? HF Exacerbation  - Lasix 20 IV QD  - on room air now  - ordered repeat echo; may need to consult cardiology based on echo report     # elevated AST/ALT >> Likely due to COVID  - nearly normalized    # left adrenal nodule  - outpatient MRI    # invasive ductal carcinoma s/p lumpectomy  # endometrial cancer s/p omentectomy s/p pelvic wash  # right lung nodule resection  # TAHBSO  - outpatient f/u  - c/w anastrazole    Pending: EP f/u; 2D echo; clinical improvement  Dispo: Sabrina ARNOLD       87-year-old female w/ Severe PHTN, CKD2, DM, HTN, DL, hypothyroidism, aflutter, invasive ductal carcinoma s/p lumpectomy, endometrial cancer s/p omentectomy s/p pelvic wash, left adrenal nodule presenting for hypoxia requiring oxygen at NH, malaise. She slipped and fell as well.     #Acute Hypoxic Respiratory Failure likely due to COVID  - Improving clinially, off O2, currently on RA  - s/p rdv x 3 days as per ID     #Suspected metabolic encephalopathy  -resolved    # DM  # HTN  # DL  # aflutter  - c/w Eliquis 2.5  - c/w metoprolol, hydralazine  - changed diet order to carb controlled; increased bedtime insulin to 16 units; added lispro 3 units TID d/w resident     #Bradycardia  - EP consulted: no intervention at this time  - Now with NSVT: recall EP for further input.     #Hypothyroidism  - c/w Synthroid    # Severe PHTN  # ? HF Exacerbation  - Lasix 20 IV QD  - on room air now  - ordered repeat echo; may need to consult cardiology based on echo report     # elevated AST/ALT >> Likely due to COVID  - nearly normalized    # left adrenal nodule  - outpatient MRI    # invasive ductal carcinoma s/p lumpectomy  # endometrial cancer s/p omentectomy s/p pelvic wash  # right lung nodule resection  # TAHBSO  - outpatient f/u  - c/w anastrazole    DVT prophylaxis: ELIQUIS   Activity: AS TOLERATED   Diet: DASH   Dispo:  Code: FULL       87-year-old female w/ Severe PHTN, CKD2, DM, HTN, DL, hypothyroidism, aflutter, invasive ductal carcinoma s/p lumpectomy, endometrial cancer s/p omentectomy s/p pelvic wash, left adrenal nodule presenting for hypoxia requiring oxygen at NH, malaise. She slipped and fell as well.     #Acute Hypoxic Respiratory Failure likely due to COVID  - Improving clinially, off O2, currently on RA  - s/p rdv x 3 days as per ID     #Suspected metabolic encephalopathy  -resolved    # DM  # HTN  # DL  # aflutter  - c/w Eliquis 2.5  - c/w metoprolol, hydralazine  - changed diet order to carb controlled; increased bedtime insulin to 16 units; added lispro 3 units TID d/w resident     #Bradycardia  - EP consulted: no intervention at this time  - Now with NSVT: recall EP for further input.     #Hypothyroidism  - c/w Synthroid    # Severe PHTN  # ? HF Exacerbation  - Lasix 20 IV QD  - on room air now  - ordered repeat echo; may need to consult cardiology based on echo report     # elevated AST/ALT >> Likely due to COVID  - nearly normalized    # left adrenal nodule  - outpatient MRI    # invasive ductal carcinoma s/p lumpectomy  # endometrial cancer s/p omentectomy s/p pelvic wash  # right lung nodule resection  # TAHBSO  - outpatient f/u  - c/w anastrazole    DVT prophylaxis: ELIQUIS   Activity: AS TOLERATED   Diet: DASH   Dispo:  Code: FULL    pending: EP FOLLOW UP, ECHO

## 2023-07-10 NOTE — PROGRESS NOTE ADULT - ASSESSMENT
87-year-old female w/ Severe PHTN, CKD2, DM, HTN, DL, hypothyroidism, aflutter, invasive ductal carcinoma s/p lumpectomy, endometrial cancer s/p omentectomy s/p pelvic wash, left adrenal nodule presenting for hypoxia requiring oxygen at NH, malaise. She slipped and fell as well.     # Acute Hypoxic Respiratory Failure likely due to COVID  status post remdesivir   resolved on room air     # Suspected metabolic encephalopathy  -resolved    # DM  CAPILLARY BLOOD GLUCOSE      POCT Blood Glucose.: 223 mg/dL (10 Jul 2023 11:47)  POCT Blood Glucose.: 175 mg/dL (10 Jul 2023 07:40)  POCT Blood Glucose.: 243 mg/dL (09 Jul 2023 23:52)  POCT Blood Glucose.: 309 mg/dL (09 Jul 2023 21:26)  POCT Blood Glucose.: 245 mg/dL (09 Jul 2023 16:41)  monitor     # HTN  BP: 128/62 (10 Jul 2023 13:17) (128/62 - 157/72)  controlled     # DL      #Bradycardia persistent   Electrophysiology follow u p   echo cardiogram     #Hypothyroidism  - c/w Synthroid    # Severe PHTN    # left adrenal nodule  - outpatient MRI    # invasive ductal carcinoma s/p lumpectomy    # endometrial cancer s/p omentectomy s/p pelvic wash and LARA BSO     # right lung nodule resection    PROGRESS NOTE HANDOFF    Pending:  TTE , EP follow up  , maintain on telemetry     Family discussion: patient verbalized understanding and agreeable to plan of care     Disposition: [facility]rina

## 2023-07-11 ENCOUNTER — TRANSCRIPTION ENCOUNTER (OUTPATIENT)
Age: 88
End: 2023-07-11

## 2023-07-11 VITALS
TEMPERATURE: 98 F | SYSTOLIC BLOOD PRESSURE: 127 MMHG | HEART RATE: 61 BPM | DIASTOLIC BLOOD PRESSURE: 62 MMHG | RESPIRATION RATE: 18 BRPM

## 2023-07-11 LAB
ALBUMIN SERPL ELPH-MCNC: 3.2 G/DL — LOW (ref 3.5–5.2)
ALP SERPL-CCNC: 91 U/L — SIGNIFICANT CHANGE UP (ref 30–115)
ALT FLD-CCNC: 17 U/L — SIGNIFICANT CHANGE UP (ref 0–41)
ANION GAP SERPL CALC-SCNC: 14 MMOL/L — SIGNIFICANT CHANGE UP (ref 7–14)
AST SERPL-CCNC: 17 U/L — SIGNIFICANT CHANGE UP (ref 0–41)
BASOPHILS # BLD AUTO: 0.06 K/UL — SIGNIFICANT CHANGE UP (ref 0–0.2)
BASOPHILS NFR BLD AUTO: 1 % — SIGNIFICANT CHANGE UP (ref 0–1)
BILIRUB SERPL-MCNC: 0.4 MG/DL — SIGNIFICANT CHANGE UP (ref 0.2–1.2)
BUN SERPL-MCNC: 39 MG/DL — HIGH (ref 10–20)
CALCIUM SERPL-MCNC: 9.2 MG/DL — SIGNIFICANT CHANGE UP (ref 8.4–10.5)
CHLORIDE SERPL-SCNC: 99 MMOL/L — SIGNIFICANT CHANGE UP (ref 98–110)
CO2 SERPL-SCNC: 27 MMOL/L — SIGNIFICANT CHANGE UP (ref 17–32)
CREAT SERPL-MCNC: 1.1 MG/DL — SIGNIFICANT CHANGE UP (ref 0.7–1.5)
EGFR: 49 ML/MIN/1.73M2 — LOW
EOSINOPHIL # BLD AUTO: 0.14 K/UL — SIGNIFICANT CHANGE UP (ref 0–0.7)
EOSINOPHIL NFR BLD AUTO: 2.3 % — SIGNIFICANT CHANGE UP (ref 0–8)
GLUCOSE BLDC GLUCOMTR-MCNC: 104 MG/DL — HIGH (ref 70–99)
GLUCOSE BLDC GLUCOMTR-MCNC: 172 MG/DL — HIGH (ref 70–99)
GLUCOSE BLDC GLUCOMTR-MCNC: 179 MG/DL — HIGH (ref 70–99)
GLUCOSE SERPL-MCNC: 74 MG/DL — SIGNIFICANT CHANGE UP (ref 70–99)
HCT VFR BLD CALC: 41.2 % — SIGNIFICANT CHANGE UP (ref 37–47)
HGB BLD-MCNC: 13.2 G/DL — SIGNIFICANT CHANGE UP (ref 12–16)
IMM GRANULOCYTES NFR BLD AUTO: 0.2 % — SIGNIFICANT CHANGE UP (ref 0.1–0.3)
LYMPHOCYTES # BLD AUTO: 1.27 K/UL — SIGNIFICANT CHANGE UP (ref 1.2–3.4)
LYMPHOCYTES # BLD AUTO: 21 % — SIGNIFICANT CHANGE UP (ref 20.5–51.1)
MAGNESIUM SERPL-MCNC: 1.9 MG/DL — SIGNIFICANT CHANGE UP (ref 1.8–2.4)
MCHC RBC-ENTMCNC: 27.6 PG — SIGNIFICANT CHANGE UP (ref 27–31)
MCHC RBC-ENTMCNC: 32 G/DL — SIGNIFICANT CHANGE UP (ref 32–37)
MCV RBC AUTO: 86 FL — SIGNIFICANT CHANGE UP (ref 81–99)
MONOCYTES # BLD AUTO: 0.71 K/UL — HIGH (ref 0.1–0.6)
MONOCYTES NFR BLD AUTO: 11.7 % — HIGH (ref 1.7–9.3)
NEUTROPHILS # BLD AUTO: 3.87 K/UL — SIGNIFICANT CHANGE UP (ref 1.4–6.5)
NEUTROPHILS NFR BLD AUTO: 63.8 % — SIGNIFICANT CHANGE UP (ref 42.2–75.2)
NRBC # BLD: 0 /100 WBCS — SIGNIFICANT CHANGE UP (ref 0–0)
PLATELET # BLD AUTO: 343 K/UL — SIGNIFICANT CHANGE UP (ref 130–400)
PMV BLD: 10.7 FL — HIGH (ref 7.4–10.4)
POTASSIUM SERPL-MCNC: 4.2 MMOL/L — SIGNIFICANT CHANGE UP (ref 3.5–5)
POTASSIUM SERPL-SCNC: 4.2 MMOL/L — SIGNIFICANT CHANGE UP (ref 3.5–5)
PROT SERPL-MCNC: 6.5 G/DL — SIGNIFICANT CHANGE UP (ref 6–8)
RBC # BLD: 4.79 M/UL — SIGNIFICANT CHANGE UP (ref 4.2–5.4)
RBC # FLD: 14.4 % — SIGNIFICANT CHANGE UP (ref 11.5–14.5)
SODIUM SERPL-SCNC: 140 MMOL/L — SIGNIFICANT CHANGE UP (ref 135–146)
WBC # BLD: 6.06 K/UL — SIGNIFICANT CHANGE UP (ref 4.8–10.8)
WBC # FLD AUTO: 6.06 K/UL — SIGNIFICANT CHANGE UP (ref 4.8–10.8)

## 2023-07-11 PROCEDURE — 99232 SBSQ HOSP IP/OBS MODERATE 35: CPT

## 2023-07-11 PROCEDURE — 93306 TTE W/DOPPLER COMPLETE: CPT | Mod: 26

## 2023-07-11 RX ORDER — EMPAGLIFLOZIN 10 MG/1
1 TABLET, FILM COATED ORAL
Qty: 0 | Refills: 0 | DISCHARGE

## 2023-07-11 RX ORDER — NIFEDIPINE 30 MG
1 TABLET, EXTENDED RELEASE 24 HR ORAL
Refills: 0 | DISCHARGE

## 2023-07-11 RX ADMIN — Medication 3 UNIT(S): at 12:13

## 2023-07-11 RX ADMIN — Medication 20 MILLIGRAM(S): at 05:06

## 2023-07-11 RX ADMIN — Medication 60 MILLIGRAM(S): at 05:06

## 2023-07-11 RX ADMIN — APIXABAN 2.5 MILLIGRAM(S): 2.5 TABLET, FILM COATED ORAL at 05:06

## 2023-07-11 RX ADMIN — Medication 2: at 12:13

## 2023-07-11 RX ADMIN — APIXABAN 2.5 MILLIGRAM(S): 2.5 TABLET, FILM COATED ORAL at 17:19

## 2023-07-11 RX ADMIN — Medication 100 MICROGRAM(S): at 05:06

## 2023-07-11 RX ADMIN — Medication 3 UNIT(S): at 08:30

## 2023-07-11 RX ADMIN — CHLORHEXIDINE GLUCONATE 1 APPLICATION(S): 213 SOLUTION TOPICAL at 12:26

## 2023-07-11 RX ADMIN — ANASTROZOLE 1 MILLIGRAM(S): 1 TABLET ORAL at 12:27

## 2023-07-11 NOTE — PROGRESS NOTE ADULT - SUBJECTIVE AND OBJECTIVE BOX
24H events:    Patient is a 87y old Female who presents with a chief complaint of covid (11 Jul 2023 12:33)    Primary diagnosis of COVID       Today is hospital day 7d.      Patient seen and examined at bedside. Reports no active complaints.     PAST MEDICAL & SURGICAL HISTORY  HTN (hypertension)    DM (diabetes mellitus)    Hypothyroidism  last blood test ~ 3months ago Spet 2022   no changes in meds    HLD (hyperlipidemia)  PT DENIES    CKD (chronic kidney disease)  stage 3A   PT DENIES    Occasional tremors  X 10 + YEARS    Nodule of right lung  s/p resection    Back pain  pain management injections dr rodriguez    Atrial flutter  PER CARDIAC NOTE    History of pulmonary hypertension    Severe pulmonary hypertension    [place] (hard of hearing)    History of lung surgery  lung nodule resection    History of hernia surgery  left inquinal    S/P LARA-BSO (total abdominal hysterectomy and bilateral salpingo-oophorectomy)    S/P LARA-BSO    S/P cataract surgery      SOCIAL HISTORY:  Negative for smoking/alcohol/drug use.     ALLERGIES:  No Known Allergies    MEDICATIONS:  STANDING MEDICATIONS  anastrozole 1 milliGRAM(s) Oral daily  apixaban 2.5 milliGRAM(s) Oral every 12 hours  chlorhexidine 2% Cloths 1 Application(s) Topical daily  dextrose 5%. 1000 milliLiter(s) IV Continuous <Continuous>  dextrose 5%. 1000 milliLiter(s) IV Continuous <Continuous>  dextrose 50% Injectable 25 Gram(s) IV Push once  dextrose 50% Injectable 12.5 Gram(s) IV Push once  dextrose 50% Injectable 25 Gram(s) IV Push once  furosemide   Injectable 20 milliGRAM(s) IV Push daily  glucagon  Injectable 1 milliGRAM(s) IntraMuscular once  insulin glargine Injectable (LANTUS) 16 Unit(s) SubCutaneous at bedtime  insulin lispro (ADMELOG) corrective regimen sliding scale   SubCutaneous three times a day before meals  insulin lispro Injectable (ADMELOG) 3 Unit(s) SubCutaneous before lunch  insulin lispro Injectable (ADMELOG) 3 Unit(s) SubCutaneous before dinner  insulin lispro Injectable (ADMELOG) 3 Unit(s) SubCutaneous before breakfast  levothyroxine 100 MICROGram(s) Oral daily  NIFEdipine XL 60 milliGRAM(s) Oral daily    PRN MEDICATIONS  acetaminophen     Tablet .. 650 milliGRAM(s) Oral every 6 hours PRN  aluminum hydroxide/magnesium hydroxide/simethicone Suspension 30 milliLiter(s) Oral every 4 hours PRN  dextrose Oral Gel 15 Gram(s) Oral once PRN  hydrALAZINE 50 milliGRAM(s) Oral four times a day PRN  melatonin 3 milliGRAM(s) Oral at bedtime PRN  ondansetron Injectable 4 milliGRAM(s) IV Push every 8 hours PRN    VITALS:   T(F): 98  HR: 61  BP: 127/62  RR: 18  SpO2: 99%    LABS:                        13.2   6.06  )-----------( 343      ( 11 Jul 2023 07:11 )             41.2     07-11    140  |  99  |  39<H>  ----------------------------<  74  4.2   |  27  |  1.1    Ca    9.2      11 Jul 2023 07:11  Mg     1.9     07-11    TPro  6.5  /  Alb  3.2<L>  /  TBili  0.4  /  DBili  x   /  AST  17  /  ALT  17  /  AlkPhos  91  07-11      Urinalysis Basic - ( 11 Jul 2023 07:11 )    Color: x / Appearance: x / SG: x / pH: x  Gluc: 74 mg/dL / Ketone: x  / Bili: x / Urobili: x   Blood: x / Protein: x / Nitrite: x   Leuk Esterase: x / RBC: x / WBC x   Sq Epi: x / Non Sq Epi: x / Bacteria: x                RADIOLOGY:    PHYSICAL EXAM:  GENERAL: NAD, well-groomed, well-developed  HEAD:  Atraumatic, Normocephalic  EYES: EOMI, PERRLA, conjunctiva and sclera clear  ENMT: No tonsillar erythema, exudates, or enlargement; Moist mucous membranes, Good dentition, No lesions  NECK: Supple, No JVD, Normal thyroid  NERVOUS SYSTEM:  Alert & Oriented X3, Good concentration; Motor Strength 5/5 B/L upper and lower extremities; DTRs 2+ intact and symmetric  PULM: Clear to auscultation bilaterally  CARDIAC: Regular rate and rhythm; No murmurs, rubs, or gallops  GI: Soft, Nontender, Nondistended; Bowel sounds present  EXTREMITIES:  2+ Peripheral Pulses, No clubbing, cyanosis, or edema

## 2023-07-11 NOTE — DISCHARGE NOTE PROVIDER - NSDCFUSCHEDAPPT_GEN_ALL_CORE_FT
Fly Ray  [facility]  CARDIOLOGY [address]  Scheduled Appointment: 07/19/2023    Murray Rodriguez  [facility]  GENSURG [address]  Scheduled Appointment: 08/03/2023

## 2023-07-11 NOTE — DISCHARGE NOTE PROVIDER - CARE PROVIDER_API CALL
Juli Pan S  Pulmonary Disease  [address] [place] [address], [address] [place] [address]  Phone: [phone]  Fax: [phone]  Follow Up Time: 1 month

## 2023-07-11 NOTE — DISCHARGE NOTE PROVIDER - NSDCMRMEDTOKEN_GEN_ALL_CORE_FT
acarbose 50 mg oral tablet: 1 tab(s) orally 3 times a day  anastrozole 1 mg oral tablet: 1 orally once a day  Eliquis 5 mg oral tablet: 1 tab(s) orally 2 times a day  glimepiride 2 mg oral tablet: 1 tab(s) orally once a day  hydrALAZINE 50 mg oral tablet: 1 tab(s) orally 4 times a day, As Needed  Jardiance 25 mg oral tablet: 1 tab(s) orally once a day (in the morning)  Lantus 100 units/mL subcutaneous solution: 16 unit(s) subcutaneous once a day (at bedtime)  levothyroxine 100 mcg (0.1 mg) oral tablet: 1 tab(s) orally once a day  metFORMIN 1000 mg oral tablet: 1 tab(s) orally 2 times a day  metoprolol tartrate 100 mg oral tablet: 1 tab(s) orally 2 times a day  Nifedical XL 60 mg oral tablet, extended release: 1 tab(s) orally once a day   acarbose 50 mg oral tablet: 1 tab(s) orally 3 times a day  anastrozole 1 mg oral tablet: 1 orally once a day  Eliquis 5 mg oral tablet: 1 tab(s) orally 2 times a day  glimepiride 2 mg oral tablet: 1 tab(s) orally once a day  hydrALAZINE 50 mg oral tablet: 1 tab(s) orally 4 times a day, As Needed  Jardiance 25 mg oral tablet: 1 tab(s) orally once a day (in the morning)  Lantus 100 units/mL subcutaneous solution: 16 unit(s) subcutaneous once a day (at bedtime)  levothyroxine 100 mcg (0.1 mg) oral tablet: 1 tab(s) orally once a day  metFORMIN 1000 mg oral tablet: 1 tab(s) orally 2 times a day  metoprolol tartrate 100 mg oral tablet: 1 tab(s) orally 2 times a day

## 2023-07-11 NOTE — PROGRESS NOTE ADULT - PROVIDER SPECIALTY LIST ADULT
Electrophysiology
Internal Medicine
Internal Medicine
Hospitalist
Internal Medicine
Internal Medicine

## 2023-07-11 NOTE — DISCHARGE NOTE PROVIDER - HOSPITAL COURSE
87-year-old female w/ Severe PHTN, CKD2, DM, HTN, DL, hypothyroidism, aflutter, invasive ductal carcinoma s/p lumpectomy, endometrial cancer s/p omentectomy s/p pelvic wash, left adrenal nodule presenting for hypoxia requiring oxygen at NH, malaise. She slipped and fell as well.     #Acute Hypoxic Respiratory Failure likely due to COVID  - Improving clinially, off O2, currently on RA  - s/p rdv x 3 days as per ID     #Suspected metabolic encephalopathy  -resolved    # DM  # HTN  # DL  # aflutter  - c/w Eliquis 2.5  - c/w metoprolol, hydralazine  - changed diet order to carb controlled; increased bedtime insulin to 16 units; added lispro 3 units TID d/w resident     #Bradycardia  - EP consulted: no intervention at this time  - Some artifacts were noted on telemetry as NSVT episodes     #Hypothyroidism  - c/w Synthroid    # Severe PHTN  # ? HF Exacerbation  - Lasix 20 IV QD  - on room air now  - ordered repeat echo; may need to consult cardiology based on echo report     # elevated AST/ALT >> Likely due to COVID  - nearly normalized    # left adrenal nodule  - outpatient MRI    # invasive ductal carcinoma s/p lumpectomy  # endometrial cancer s/p omentectomy s/p pelvic wash  # right lung nodule resection  # TAHBSO  - outpatient f/u  - c/w anastrazole

## 2023-07-11 NOTE — PROGRESS NOTE ADULT - SUBJECTIVE AND OBJECTIVE BOX
LORETTA SALCEDO  87y  [facility] [address]      Patient is a 87y old  Female who presents with a chief complaint of covid (11 Jul 2023 11:15)      My note supersedes the resident's note      INTERVAL HPI/OVERNIGHT EVENTS:  no acute events overnight       REVIEW OF SYSTEMS:  CONSTITUTIONAL: No fever, weight loss, or fatigue  EYES: No eye pain, visual disturbances, or discharge  ENMT:  No difficulty hearing, tinnitus, vertigo; No sinus or throat pain  NECK: No pain or stiffness  BREASTS: No pain, masses, or nipple discharge  RESPIRATORY: No cough, wheezing, chills or hemoptysis; No shortness of breath  CARDIOVASCULAR: No chest pain, palpitations, dizziness, or leg swelling  GASTROINTESTINAL: No abdominal or epigastric pain. No nausea, vomiting, or hematemesis; No diarrhea or constipation. No melena or hematochezia.  GENITOURINARY: No dysuria, frequency, hematuria, or incontinence  NEUROLOGICAL: No headaches, memory loss, loss of strength, numbness, or tremors  SKIN: No itching, burning, rashes, or lesions   LYMPH NODES: No enlarged glands  ENDOCRINE: No heat or cold intolerance; No hair loss  MUSCULOSKELETAL: No joint pain or swelling; No muscle, back, or extremity pain  PSYCHIATRIC: No depression, anxiety, mood swings, or difficulty sleeping  HEME/LYMPH: No easy bruising, or bleeding gums  ALLERY AND IMMUNOLOGIC: No hives or eczema  FAMILY HISTORY:  Known health problems: none      T(C): 35.9 (07-11-23 @ 04:38), Max: 36.4 (07-10-23 @ 20:01)  HR: 59 (07-11-23 @ 04:38) (59 - 83)  BP: 184/79 (07-11-23 @ 04:38) (128/62 - 184/79)  RR: 18 (07-10-23 @ 20:32) (18 - 18)  SpO2: 99% (07-10-23 @ 20:32) (99% - 99%)  Wt(kg): --Vital Signs Last 24 Hrs  T(C): 35.9 (11 Jul 2023 04:38), Max: 36.4 (10 Jul 2023 20:01)  T(F): 96.7 (11 Jul 2023 04:38), Max: 97.6 (10 Jul 2023 20:01)  HR: 59 (11 Jul 2023 04:38) (59 - 83)  BP: 184/79 (11 Jul 2023 04:38) (128/62 - 184/79)  BP(mean): 91 (10 Jul 2023 20:01) (91 - 91)  RR: 18 (10 Jul 2023 20:32) (18 - 18)  SpO2: 99% (10 Jul 2023 20:32) (99% - 99%)    Parameters below as of 10 Jul 2023 20:32  Patient On (Oxygen Delivery Method): room air        PHYSICAL EXAM:  GENERAL: NAD, well-groomed, well-developed  HEAD:  Atraumatic, Normocephalic  EYES: EOMI, PERRLA, conjunctiva and sclera clear  ENMT: No tonsillar erythema, exudates, or enlargement; Moist mucous membranes, Good dentition, No lesions  NECK: Supple, No JVD, Normal thyroid  NERVOUS SYSTEM:  Alert & Oriented X3, Good concentration; Motor Strength 5/5 B/L upper and lower extremities; DTRs 2+ intact and symmetric  PULM: Clear to auscultation bilaterally  CARDIAC: Regular rate and rhythm; No murmurs, rubs, or gallops  GI: Soft, Nontender, Nondistended; Bowel sounds present  EXTREMITIES:  2+ Peripheral Pulses, No clubbing, cyanosis, or edema  LYMPH: No lymphadenopathy noted  SKIN: No rashes or lesions    Consultant(s) Notes Reviewed:  [x ] YES  [ ] NO  Care Discussed with Consultants/Other Providers [ x] YES  [ ] NO    LABS:                            13.2   6.06  )-----------( 343      ( 11 Jul 2023 07:11 )             41.2   07-11    140  |  99  |  39<H>  ----------------------------<  74  4.2   |  27  |  1.1    Ca    9.2      11 Jul 2023 07:11  Mg     1.9     07-11    TPro  6.5  /  Alb  3.2<L>  /  TBili  0.4  /  DBili  x   /  AST  17  /  ALT  17  /  AlkPhos  91  07-11            acetaminophen     Tablet .. 650 milliGRAM(s) Oral every 6 hours PRN  aluminum hydroxide/magnesium hydroxide/simethicone Suspension 30 milliLiter(s) Oral every 4 hours PRN  anastrozole 1 milliGRAM(s) Oral daily  apixaban 2.5 milliGRAM(s) Oral every 12 hours  chlorhexidine 2% Cloths 1 Application(s) Topical daily  dextrose 5%. 1000 milliLiter(s) IV Continuous <Continuous>  dextrose 5%. 1000 milliLiter(s) IV Continuous <Continuous>  dextrose 50% Injectable 25 Gram(s) IV Push once  dextrose 50% Injectable 12.5 Gram(s) IV Push once  dextrose 50% Injectable 25 Gram(s) IV Push once  dextrose Oral Gel 15 Gram(s) Oral once PRN  furosemide   Injectable 20 milliGRAM(s) IV Push daily  glucagon  Injectable 1 milliGRAM(s) IntraMuscular once  hydrALAZINE 50 milliGRAM(s) Oral four times a day PRN  insulin glargine Injectable (LANTUS) 16 Unit(s) SubCutaneous at bedtime  insulin lispro (ADMELOG) corrective regimen sliding scale   SubCutaneous three times a day before meals  insulin lispro Injectable (ADMELOG) 3 Unit(s) SubCutaneous before lunch  insulin lispro Injectable (ADMELOG) 3 Unit(s) SubCutaneous before dinner  insulin lispro Injectable (ADMELOG) 3 Unit(s) SubCutaneous before breakfast  levothyroxine 100 MICROGram(s) Oral daily  melatonin 3 milliGRAM(s) Oral at bedtime PRN  NIFEdipine XL 60 milliGRAM(s) Oral daily  ondansetron Injectable 4 milliGRAM(s) IV Push every 8 hours PRN      HEALTH ISSUES - PROBLEM Dx:          Case Discussed with House Staff   45 minutes spent on total encounter; more than 50% of the visit was spent counseling and/or coordinating care by the attending physician.  Spectra [phone]

## 2023-07-11 NOTE — PROGRESS NOTE ADULT - ASSESSMENT
87-year-old female w/ Severe PHTN, CKD2, DM, HTN, DL, hypothyroidism, aflutter, invasive ductal carcinoma s/p lumpectomy, endometrial cancer s/p omentectomy s/p pelvic wash, left adrenal nodule presenting for hypoxia requiring oxygen at NH, malaise. She slipped and fell as well.     # Acute Hypoxic Respiratory Failure likely due to COVID  status post remdesivir   resolved on room air     # Suspected metabolic encephalopathy  -resolved    # DM  POCT Blood Glucose.: 179 mg/dL (11 Jul 2023 11:45)  POCT Blood Glucose.: 104 mg/dL (11 Jul 2023 08:23)  POCT Blood Glucose.: 109 mg/dL (10 Jul 2023 21:27)  POCT Blood Glucose.: 165 mg/dL (10 Jul 2023 16:33)  controlled     # HTN  BP: 128/62 (10 Jul 2023 13:17) (128/62 - 157/72)  controlled     # DL      #Bradycardia resolved   Electrophysiology follow up appreciated   echo cardiogram  pending - expedited     #Hypothyroidism  - c/w Synthroid    # Severe PHTN    # left adrenal nodule  - outpatient MRI    # invasive ductal carcinoma s/p lumpectomy    # endometrial cancer s/p omentectomy s/p pelvic wash and LARA BSO     # right lung nodule resection    PROGRESS NOTE HANDOFF    Pending:  TTE  pending      Family discussion: patient verbalized understanding and agreeable to plan of care     Disposition: Sabrina

## 2023-07-11 NOTE — DISCHARGE NOTE PROVIDER - NSDCCPCAREPLAN_GEN_ALL_CORE_FT
PRINCIPAL DISCHARGE DIAGNOSIS  Diagnosis: COVID  Assessment and Plan of Treatment: you were admitted and managed for Covid-19 infection.  Coronavirus disease 2019 (COVID-19) is a respiratory illness  that can spread from person to person. The virus that causes  COVID-19 is a novel coronavirus that was first identified during  an investigation into an outbreak in [place], [place].  The virus that causes COVID-19 probably emerged from an  animal source, but is now spreading from person to person.  The virus is thought to spread mainly between people who  are in close contact with one another (within about 6 feet)  through respiratory droplets produced when an infected  person coughs or sneezes. It also may be possible that a person  can get COVID-19 by touching a surface or object that has  the virus on it and then touching their own mouth, nose, or  possibly their eyes, but this is not thought to be the main  way the virus spreads.  Please stay home and avoid contact with others for at least a week after symptoms resolve and follow government guidelines.   Patients with COVID-19 have had mild to severe respiratory  illness with symptoms of  • fever  • cough  • shortness of breath  People can help protect themselves from respiratory illness with  everyday preventive actions.    • Avoid close contact with people who are sick.  • Avoid touching your eyes, nose, and mouth with  unwashed hands.  • Wash your hands often with soap and water for at least 20   seconds. Use an alcohol-based hand  that contains at  least 60% alcohol if soap and water are not available.   Stay home when you are sick.  • Cover your cough or sneeze with a tissue, then throw the  tissue in the trash.  • Clean and disinfect frequently touched objects  and surfaces.  Call 911 and inform them you are covid positive before you decide to go to the emergency room if you have chest pain, difficulty breathing, high fevers, worsening of your symptoms, feel unwell, or have nausea and vomiting.

## 2023-07-11 NOTE — PROGRESS NOTE ADULT - ASSESSMENT
87-year-old female w/ Severe PHTN, CKD2, DM, HTN, DL, hypothyroidism, aflutter, invasive ductal carcinoma s/p lumpectomy, endometrial cancer s/p omentectomy s/p pelvic wash, left adrenal nodule presenting for hypoxia requiring oxygen at NH, malaise. She slipped and fell as well.     #Acute Hypoxic Respiratory Failure likely due to COVID  - Improving clinially, off O2, currently on RA  - s/p rdv x 3 days as per ID     #Suspected metabolic encephalopathy  -resolved    # DM  # HTN  # DL  # aflutter  - c/w Eliquis 2.5  - c/w metoprolol, hydralazine  - changed diet order to carb controlled; increased bedtime insulin to 16 units; added lispro 3 units TID d/w resident     #Bradycardia  - EP consulted: no intervention at this time  - Now with NSVT: recall EP for further input.     #Hypothyroidism  - c/w Synthroid    # Severe PHTN  # ? HF Exacerbation  - Lasix 20 IV QD  - on room air now  - ordered repeat echo; may need to consult cardiology based on echo report     # elevated AST/ALT >> Likely due to COVID  - nearly normalized    # left adrenal nodule  - outpatient MRI    # invasive ductal carcinoma s/p lumpectomy  # endometrial cancer s/p omentectomy s/p pelvic wash  # right lung nodule resection  # TAHBSO  - outpatient f/u  - c/w anastrazole    DVT prophylaxis: ELIQUIS   Activity: AS TOLERATED   Diet: DASH   Dispo:  Code: FULL    pending: EP FOLLOW UP, ECHO    87-year-old female w/ Severe PHTN, CKD2, DM, HTN, DL, hypothyroidism, aflutter, invasive ductal carcinoma s/p lumpectomy, endometrial cancer s/p omentectomy s/p pelvic wash, left adrenal nodule presenting for hypoxia requiring oxygen at NH, malaise. She slipped and fell as well.     #Acute Hypoxic Respiratory Failure likely due to COVID  - Improving clinially, off O2, currently on RA  - s/p rdv x 3 days as per ID     #Suspected metabolic encephalopathy  -resolved    # DM  # HTN  # DL  # aflutter  - c/w Eliquis 2.5  - c/w metoprolol, hydralazine  - changed diet order to carb controlled; increased bedtime insulin to 16 units; added lispro 3 units TID d/w resident     #Bradycardia  - EP consulted: no intervention at this time  - Now with NSVT: recall EP for further input.     #Hypothyroidism  - c/w Synthroid    # Severe PHTN  # ? HF Exacerbation  - Lasix 20 IV QD  - on room air now  - ordered repeat echo; may need to consult cardiology based on echo report     # elevated AST/ALT >> Likely due to COVID  - nearly normalized    # left adrenal nodule  - outpatient MRI    # invasive ductal carcinoma s/p lumpectomy  # endometrial cancer s/p omentectomy s/p pelvic wash  # right lung nodule resection  # TAHBSO  - outpatient f/u  - c/w anastrazole    DVT prophylaxis: ELIQUIS   Activity: AS TOLERATED   Diet: DASH   Dispo:  Code: FULL    pending: discharge

## 2023-07-18 DIAGNOSIS — J12.82 PNEUMONIA DUE TO CORONAVIRUS DISEASE 2019: ICD-10-CM

## 2023-07-18 DIAGNOSIS — Z79.84 LONG TERM (CURRENT) USE OF ORAL HYPOGLYCEMIC DRUGS: ICD-10-CM

## 2023-07-18 DIAGNOSIS — G93.41 METABOLIC ENCEPHALOPATHY: ICD-10-CM

## 2023-07-18 DIAGNOSIS — E83.42 HYPOMAGNESEMIA: ICD-10-CM

## 2023-07-18 DIAGNOSIS — E11.22 TYPE 2 DIABETES MELLITUS WITH DIABETIC CHRONIC KIDNEY DISEASE: ICD-10-CM

## 2023-07-18 DIAGNOSIS — Z79.4 LONG TERM (CURRENT) USE OF INSULIN: ICD-10-CM

## 2023-07-18 DIAGNOSIS — I13.0 HYPERTENSIVE HEART AND CHRONIC KIDNEY DISEASE WITH HEART FAILURE AND STAGE 1 THROUGH STAGE 4 CHRONIC KIDNEY DISEASE, OR UNSPECIFIED CHRONIC KIDNEY DISEASE: ICD-10-CM
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
## 2023-07-18 DIAGNOSIS — Z98.49 CATARACT EXTRACTION STATUS, UNSPECIFIED EYE: ICD-10-CM

## 2023-07-18 DIAGNOSIS — I27.20 PULMONARY HYPERTENSION, UNSPECIFIED: ICD-10-CM

## 2023-07-18 DIAGNOSIS — U07.1 COVID-19: ICD-10-CM

## 2023-07-18 DIAGNOSIS — I50.9 HEART FAILURE, UNSPECIFIED: ICD-10-CM

## 2023-07-18 DIAGNOSIS — Z90.722 ACQUIRED ABSENCE OF OVARIES, BILATERAL: ICD-10-CM
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
## 2023-07-18 DIAGNOSIS — Z90.710 ACQUIRED ABSENCE OF BOTH CERVIX AND UTERUS: ICD-10-CM

## 2023-07-18 DIAGNOSIS — I47.1 SUPRAVENTRICULAR TACHYCARDIA: ICD-10-CM

## 2023-07-18 DIAGNOSIS — I48.92 UNSPECIFIED ATRIAL FLUTTER: ICD-10-CM

## 2023-07-18 DIAGNOSIS — E27.8 OTHER SPECIFIED DISORDERS OF ADRENAL GLAND: ICD-10-CM

## 2023-07-18 DIAGNOSIS — Z79.01 LONG TERM (CURRENT) USE OF ANTICOAGULANTS: ICD-10-CM

## 2023-07-18 DIAGNOSIS — J96.01 ACUTE RESPIRATORY FAILURE WITH HYPOXIA: ICD-10-CM

## 2023-07-18 DIAGNOSIS — Z79.890 HORMONE REPLACEMENT THERAPY: ICD-10-CM

## 2023-07-18 DIAGNOSIS — E03.9 HYPOTHYROIDISM, UNSPECIFIED: ICD-10-CM

## 2023-07-18 DIAGNOSIS — N18.2 CHRONIC KIDNEY DISEASE, STAGE 2 (MILD): ICD-10-CM

## 2023-07-19 ENCOUNTER — APPOINTMENT (OUTPATIENT)
Dept: CARDIOLOGY | Facility: CLINIC | Age: 88
End: 2023-07-19

## 2023-08-03 ENCOUNTER — APPOINTMENT (OUTPATIENT)
Dept: SURGERY | Facility: CLINIC | Age: 88
End: 2023-08-03

## 2023-10-10 ENCOUNTER — APPOINTMENT (OUTPATIENT)
Dept: GYNECOLOGIC ONCOLOGY | Facility: CLINIC | Age: 88
End: 2023-10-10

## 2023-10-16 ENCOUNTER — APPOINTMENT (OUTPATIENT)
Dept: CARDIOLOGY | Facility: CLINIC | Age: 88
End: 2023-10-16

## 2025-03-30 NOTE — ASU PATIENT PROFILE, ADULT - HISTORY OF COVID-19 VACCINATION
This patient has been assessed with a concern for Malnutrition and was treated during this hospitalization for the following Nutrition diagnosis/diagnoses:     -  03/18/2025: Severe protein-calorie malnutrition  
Yes

## 2025-07-16 NOTE — PATIENT INSTRUCTIONS
Please arrange follow up with your primary medical clinic as soon as possible. You must understand that you've received an Urgent Care treatment only and that you may be released before all of your medical problems are known or treated. You, the patient, will arrange for follow up as instructed. If your symptoms worsen or fail to improve you should go to the Emergency Room.      Sciatica    Sciatica is a condition that causes pain in the lower back that spreads down into the buttock, hip, and leg. Sometimes the leg pain can happen without any back pain. Sciatica happens when a spinal nerve is irritated or has pressure put on it as comes out of the spinal canal in the lower back. This most often happens when a bulge or rupture of a nearby spinal disk presses on the nerve. Sciatica can also be caused by a narrowing of the spinal canal (spinal stenosis) or spasm of the muscle in the buttocks that the sciatic nerve passes through (pyriform muscle). Sciatica is also called lumbar radiculopathy.  Sciatica may begin after a sudden twisting or bending force, such as in a car accident. Or it can happen after a simple awkward movement. In either case, muscle spasm often also happens. Muscle spasm makes the pain worse.  A healthcare provider makes a diagnosis of sciatica from your symptoms and a physical exam. Unless you had an injury from a car accident or fall, you usually wont have X-rays taken at this time. This is because the nerves and disks in your back cant be seen on an X-ray. If the provider sees signs of a compressed nerve, you will need to schedule an MRI scan as an outpatient. Signs of a compressed nerve include loss of strength in a leg.  Most sciatica gets better with medicine, exercise, and physical therapy. If your symptoms continue after at least 3 months of medical treatment, you may need surgery or injections to your lower back.  Home care  Follow these tips when caring for yourself at home:  · You may  need to stay in bed the first few days. But as soon as possible, begin sitting up or walking. This will help you avoid problems that come from staying in bed for long periods.  · When in bed, try to find a position that is comfortable. A firm mattress is best. Try lying flat on your back with pillows under your knees. You can also try lying on your side with your knees bent up toward your chest and a pillow between your knees.  · Avoid sitting for long periods. This puts more stress on your lower back than standing or walking.  · Use heat from a hot shower, hot bath, or heating pad to help ease pain. Massage can also help. You can also try using an ice pack. You can make your own ice pack by putting ice cubes in a plastic bag. Wrap the bag in a thin towel. Try both heat and cold to see which works best. Use the method that feels best for 20 minutes several times a day.  · You may use acetaminophen or ibuprofen to ease pain, unless another pain medicine was prescribed. Note: If you have chronic liver or kidney disease, talk with your healthcare provider before taking these medicines. Also talk with your provider if youve had a stomach ulcer or gastrointestinal bleeding.  · Use safe lifting methods. Dont lift anything heavier than 15 pounds until all of the pain is gone.  Follow-up care  Follow up with your healthcare provider, or as advised. You may need physical therapy or additional tests.  If X-rays were taken, a radiologist will look at them. You will be told of any new findings that may affect your care.  When to seek medical advice  Call your healthcare provider right away if any of these occur:  · Pain gets worse even after taking prescribed medicine  · Weakness or numbness in 1 or both legs or hips  · Numbness in your groin or genital area  · You cant control your bowel or bladder  · Fever  · Redness or swelling over your back or spine   Date Last Reviewed: 8/1/2016  © 2726-8457 [employer]  [address] [place] [id number]. All rights reserved. This information is not intended as a substitute for professional medical care. Always follow your healthcare professional's instructions.         Patient is A&O x 4 at baseline. Pt denied, weakness sweating and chills, dizziness, blurred vision.